# Patient Record
Sex: FEMALE | Race: WHITE | Employment: OTHER | URBAN - METROPOLITAN AREA
[De-identification: names, ages, dates, MRNs, and addresses within clinical notes are randomized per-mention and may not be internally consistent; named-entity substitution may affect disease eponyms.]

---

## 2017-01-12 ENCOUNTER — TELEPHONE (OUTPATIENT)
Dept: INTERNAL MEDICINE CLINIC | Facility: CLINIC | Age: 62
End: 2017-01-12

## 2017-01-12 DIAGNOSIS — F32.A DEPRESSIVE DISORDER: Primary | ICD-10-CM

## 2017-01-12 DIAGNOSIS — I83.811 VARICOSE VEINS OF RIGHT LOWER EXTREMITIES WITH PAIN: ICD-10-CM

## 2017-01-12 NOTE — TELEPHONE ENCOUNTER
Pt has a couple questions for the MD  Pt stts she will have new insurance as of 02-1-17  Pt also has meds that need to be refilled prior to her new insurance   Pt has also discontinued one of her New meds and would like to further discuss   Good call back

## 2017-01-13 NOTE — TELEPHONE ENCOUNTER
LV as more of an FYI from patient:     Patient states that she wanted LV to be aware that she has stopped taking her Oxybutynin , she states she was having terrible dry mouth and did not like the way it was making her feel.  In researching long term effects

## 2017-01-13 NOTE — TELEPHONE ENCOUNTER
Refill Protocol Appointment Criteria: Refilled per protocol    · Appointment scheduled in the past 6 months or in the next 3 months  Recent Visits       Provider Department Primary Dx    3 months ago Sejal Dixon MD [facility], [facility], [address] [facility] [place]

## 2017-01-17 NOTE — TELEPHONE ENCOUNTER
Per pt, she would like to talk to RN again, pt stts that she has more info to give to RN in regards to her insurance and med. Anne Fang Pls advise.

## 2017-01-18 NOTE — TELEPHONE ENCOUNTER
Patient should call Health plan for providers in network and benefits. Our vascular surgeons treat varicose veins that are not cosmetic.

## 2017-01-18 NOTE — TELEPHONE ENCOUNTER
I referred her to the vein clinic for painful varicose veins, not cosmetic treatment. Please see the referral.  Can she go to the vein clinic here for this using her new [employer] HMO?

## 2017-01-18 NOTE — TELEPHONE ENCOUNTER
Dr Junie Mesa, please advise. Pt says she is changing from [employer] to [employer] hmo effective 2/1. Pt says LV had referred her to Dr Wilda Reese and Dr Jean Pierre Willson.   Pt says she has made a lot of calls but she found out that her new insurance is not accepted

## 2017-01-19 NOTE — TELEPHONE ENCOUNTER
We have Dr. Aron Schneider who treats varicose veins.   Please place order for referral.  Thank Randolph Becker

## 2017-02-09 PROBLEM — I83.891 VARICOSE VEINS OF RIGHT LOWER EXTREMITY WITH COMPLICATIONS: Status: ACTIVE | Noted: 2017-02-09

## 2017-03-14 ENCOUNTER — TELEPHONE (OUTPATIENT)
Dept: INTERNAL MEDICINE CLINIC | Facility: CLINIC | Age: 62
End: 2017-03-14

## 2017-03-14 DIAGNOSIS — I83.93 VARICOSE VEINS OF BOTH LOWER EXTREMITIES: Primary | ICD-10-CM

## 2017-03-14 NOTE — TELEPHONE ENCOUNTER
Pt calling regarding her claim to see Dr. Everardo Ratliff was denied. Pt has updated her insurance and will need to see if we can back date referral so that her visit can be paid for pt appt was feb 9, 2017. Edwin Chin please advise

## 2017-03-16 NOTE — TELEPHONE ENCOUNTER
LMTCB on pt voice mail. There is an [facility] ref for 3 visits to Dr. Anil Barahona. More info is needed as to why claim is denied.

## 2017-03-21 NOTE — TELEPHONE ENCOUNTER
Pt is returning the call and would like to speak with Kenny Cabral. Please, call pt at [phone] most of the day until 3:00 pm after [phone].

## 2017-03-21 NOTE — TELEPHONE ENCOUNTER
Spoke  To pt. Stated her claims were denied because her ins changed on 2/1 to [facility] and the ref for 2/9 appt  To Dr. Miguel Red had old ins listed.  A new ref is generated and tasked to LV for signoff

## 2017-04-12 NOTE — TELEPHONE ENCOUNTER
Pt/[phone] until 5:00 today or after [phone] states that the insurance is still denying her claim and would like to speak with Pawan Singh.

## 2017-04-13 NOTE — TELEPHONE ENCOUNTER
Call transferred to me from the phone room. Pt received a denial of payment from her ins for OV in Feb with Dr. Franchesca Sandoval. Pt ins changed on 2/1 and the ref was generated in Jan reflecting the old ins.  LM with dr. Governor Peters office staff for Yadi at [phone]

## 2017-04-17 ENCOUNTER — TELEPHONE (OUTPATIENT)
Dept: INTERNAL MEDICINE CLINIC | Facility: CLINIC | Age: 62
End: 2017-04-17

## 2017-04-18 NOTE — TELEPHONE ENCOUNTER
Refill Protocol Appointment Criteria; PLEASE ADVISE ON REFILL. THANKS.   · Appointment scheduled in the past 6 months or in the next 3 months  Recent Visits       Provider Department Primary Dx    6 months ago Marcellus Olivares MD [facility], [address],

## 2017-04-18 NOTE — TELEPHONE ENCOUNTER
Pt states she is out of Rx Sertraline since over the weekend, Pt requesting refill ASAP. Pt states pharmacy has records they faxed request Friday , and was in epic Monday. Please Advise.        Current Outpatient Prescriptions:  Sertraline HCl 50 MG Ora

## 2017-04-27 NOTE — TELEPHONE ENCOUNTER
Pt returning call from Donato Coronado [facility].    Pt requesting call back at her work number [phone]

## 2017-04-27 NOTE — TELEPHONE ENCOUNTER
Spoke to pt. Called to inquire status of ov from Feb with incorrect ref. Marie Samuels at Dr. Olga Mora office who was checking on this. LM with office staff for Terrence Aldridge to call me back.  Phone room please forward call to [phone] until 4pm

## 2017-04-27 NOTE — TELEPHONE ENCOUNTER
Spoke to Yadi at Dr. Ksenia Burgess office. Ins has not made payment on the feb ov. I will send this to [facility] to research the possibility of a back dated ref.

## 2017-05-24 ENCOUNTER — TELEPHONE (OUTPATIENT)
Dept: INTERNAL MEDICINE CLINIC | Facility: CLINIC | Age: 62
End: 2017-05-24

## 2017-05-29 NOTE — TELEPHONE ENCOUNTER
There is a referral on file for vascular surgery dated 2/9/2017; is this still valid? In previous notes it looks as if there is a request for a back dated referral for Feb 2017.

## 2017-05-30 NOTE — TELEPHONE ENCOUNTER
Pt calling back. Pt stts she only has address for members only. Pt stts office will need to contact [facility] to receive address. ... [address] office visit was 2/9 , had ultrasound same day.    [facility] received office visit claim on 2/17 and tyra

## 2017-05-30 NOTE — TELEPHONE ENCOUNTER
Spoke to pt. Stated the ins has declined the back dated referral. Requesting a letter be generated by primary indicating a ref was in place on date of service but old ins company was listed.  Requested pt to call back with address of ins where to send lette

## 2017-05-31 NOTE — TELEPHONE ENCOUNTER
LV - please advise if ok to generate letter requested below  (I have already prepared and pend a draft for you to review)

## 2017-08-22 ENCOUNTER — TELEPHONE (OUTPATIENT)
Dept: INTERNAL MEDICINE CLINIC | Facility: CLINIC | Age: 62
End: 2017-08-22

## 2017-08-22 DIAGNOSIS — Q15.9 EYE ABNORMALITIES: Primary | ICD-10-CM

## 2017-08-22 NOTE — TELEPHONE ENCOUNTER
Pte called in today after a visit from her optometrist who suggests she see a ophthalmologist regarding a possible retina tear. She is very worried and would like a referral ASAP so she can set up an appt ,possibly,  this week at the [facility].

## 2017-08-22 NOTE — TELEPHONE ENCOUNTER
Spoke with patient she stated she was having flashes of light in her left eye. The flashing stopped but  Now she has a large floater in that same eye. Patient has a appointment tomorrow with Nannette Delong.  Patient requesting that referral be faxed to [phone]

## 2017-08-22 NOTE — TELEPHONE ENCOUNTER
Referral written, but we need to know what the problem is. Why did the optometrist send her? What are her symptoms? Does she see flashing lights? We have to have a reason to refer a patient.

## 2017-08-25 ENCOUNTER — MED REC SCAN ONLY (OUTPATIENT)
Dept: INTERNAL MEDICINE CLINIC | Facility: CLINIC | Age: 62
End: 2017-08-25

## 2017-08-31 ENCOUNTER — TELEPHONE (OUTPATIENT)
Dept: INTERNAL MEDICINE CLINIC | Facility: CLINIC | Age: 62
End: 2017-08-31

## 2017-08-31 ENCOUNTER — TELEPHONE (OUTPATIENT)
Dept: HEMATOLOGY/ONCOLOGY | Facility: HOSPITAL | Age: 62
End: 2017-08-31

## 2017-08-31 DIAGNOSIS — C50.812 CANCER OF OVERLAPPING SITES OF LEFT FEMALE BREAST (HCC): Primary | ICD-10-CM

## 2017-08-31 DIAGNOSIS — Z85.3 HISTORY OF BREAST CANCER: Primary | ICD-10-CM

## 2017-10-03 ENCOUNTER — HOSPITAL ENCOUNTER (OUTPATIENT)
Dept: MAMMOGRAPHY | Facility: HOSPITAL | Age: 62
Discharge: HOME | End: 2017-10-03
Attending: INTERNAL MEDICINE
Payer: COMMERCIAL

## 2017-10-03 DIAGNOSIS — C50.812 CANCER OF OVERLAPPING SITES OF LEFT FEMALE BREAST (HCC): ICD-10-CM

## 2017-10-03 PROCEDURE — 77065 DX MAMMO INCL CAD UNI: CPT | Performed by: INTERNAL MEDICINE

## 2017-10-04 ENCOUNTER — OFFICE VISIT (OUTPATIENT)
Dept: INTERNAL MEDICINE CLINIC | Facility: CLINIC | Age: 62
End: 2017-10-04

## 2017-10-04 VITALS
DIASTOLIC BLOOD PRESSURE: 67 MMHG | RESPIRATION RATE: 18 BRPM | HEART RATE: 61 BPM | SYSTOLIC BLOOD PRESSURE: 104 MMHG | HEIGHT: 67.5 IN | BODY MASS INDEX: 20.01 KG/M2 | TEMPERATURE: 98 F | WEIGHT: 129 LBS

## 2017-10-04 DIAGNOSIS — E78.00 PURE HYPERCHOLESTEROLEMIA: ICD-10-CM

## 2017-10-04 DIAGNOSIS — Z85.3 HISTORY OF BREAST CANCER: ICD-10-CM

## 2017-10-04 DIAGNOSIS — M85.80 OSTEOPENIA, UNSPECIFIED LOCATION: ICD-10-CM

## 2017-10-04 DIAGNOSIS — E55.9 VITAMIN D DEFICIENCY: ICD-10-CM

## 2017-10-04 DIAGNOSIS — Z00.00 PHYSICAL EXAM: Primary | ICD-10-CM

## 2017-10-04 DIAGNOSIS — I83.93 VARICOSE VEINS OF BOTH LOWER EXTREMITIES: ICD-10-CM

## 2017-10-04 DIAGNOSIS — Z23 NEED FOR VACCINATION: ICD-10-CM

## 2017-10-04 PROCEDURE — 90732 PPSV23 VACC 2 YRS+ SUBQ/IM: CPT | Performed by: INTERNAL MEDICINE

## 2017-10-04 PROCEDURE — 99396 PREV VISIT EST AGE 40-64: CPT | Performed by: INTERNAL MEDICINE

## 2017-10-04 PROCEDURE — 90686 IIV4 VACC NO PRSV 0.5 ML IM: CPT | Performed by: INTERNAL MEDICINE

## 2017-10-04 PROCEDURE — 90471 IMMUNIZATION ADMIN: CPT | Performed by: INTERNAL MEDICINE

## 2017-10-04 PROCEDURE — 90472 IMMUNIZATION ADMIN EACH ADD: CPT | Performed by: INTERNAL MEDICINE

## 2017-10-04 NOTE — PROGRESS NOTES
Pt was given the influenza vaccine in the left deltoid and the pneumonia 23 vaccine in the right deltoid. Pt tolerated both injection well.

## 2017-10-04 NOTE — PROGRESS NOTES
HPI:    Patient ID: Merle Matthews is a 63year old female.   Patient patient presents today for physical exam, states doing well otherwise, denies chest pain, shortness of breath, dyspnea on exertion or heart palpitations also denies nausea, vomiting, jairo Mouth/Throat: Uvula is midline and oropharynx is clear and moist. No oropharyngeal exudate or posterior oropharyngeal erythema. Eyes: Right eye exhibits no discharge. Left eye exhibits no discharge. No scleral icterus. Neck: Neck supple.  No JVD present • OSTEOPENIA 7/2010 borderline osteopenia (stopped Fosamax at this time)   • Osteoporosis    • OTHER DISEASES DEXA 2008 (normal)   • Unspecified vitamin D deficiency 4/16/2009      Past Surgical History:  1987: BREAST SURGERY PROCEDURE UNLISTED      Commen Smoking status: Never Smoker                                                              Smokeless tobacco: Never Used                      Alcohol use: Yes           0.0 oz/week     Comment: ONE PER MTH         Outpatient Prescriptions Marked as Taking f

## 2017-10-20 ENCOUNTER — TELEPHONE (OUTPATIENT)
Dept: OTHER | Age: 62
End: 2017-10-20

## 2017-10-20 DIAGNOSIS — I83.891 VARICOSE VEINS OF RIGHT LOWER EXTREMITY WITH COMPLICATIONS: Primary | ICD-10-CM

## 2017-10-20 DIAGNOSIS — Z85.3 HX: BREAST CANCER: ICD-10-CM

## 2017-10-20 NOTE — TELEPHONE ENCOUNTER
Referral generated per protocol and patient made aware via my chart CSM    Taken from a CMP my chart 10/11/17 encounter.     Hi--   Can you please confirm that referrals have been sent to both Dr. Galian Alcantara and Dr. Chano Case for my upcoming appointm

## 2017-10-28 ENCOUNTER — LAB ENCOUNTER (OUTPATIENT)
Dept: LAB | Age: 62
End: 2017-10-28
Attending: INTERNAL MEDICINE
Payer: COMMERCIAL

## 2017-10-28 DIAGNOSIS — Z00.00 PHYSICAL EXAM: ICD-10-CM

## 2017-10-28 DIAGNOSIS — E55.9 VITAMIN D DEFICIENCY: ICD-10-CM

## 2017-10-28 DIAGNOSIS — E78.00 PURE HYPERCHOLESTEROLEMIA: ICD-10-CM

## 2017-10-28 DIAGNOSIS — M85.80 OSTEOPENIA, UNSPECIFIED LOCATION: ICD-10-CM

## 2017-10-28 PROCEDURE — 36415 COLL VENOUS BLD VENIPUNCTURE: CPT

## 2017-10-28 PROCEDURE — 85025 COMPLETE CBC W/AUTO DIFF WBC: CPT

## 2017-10-28 PROCEDURE — 82306 VITAMIN D 25 HYDROXY: CPT

## 2017-10-28 PROCEDURE — 80061 LIPID PANEL: CPT

## 2017-10-28 PROCEDURE — 84443 ASSAY THYROID STIM HORMONE: CPT

## 2017-10-28 PROCEDURE — 80053 COMPREHEN METABOLIC PANEL: CPT

## 2017-10-28 PROCEDURE — 81015 MICROSCOPIC EXAM OF URINE: CPT

## 2017-11-01 ENCOUNTER — TELEPHONE (OUTPATIENT)
Dept: INTERNAL MEDICINE CLINIC | Facility: CLINIC | Age: 62
End: 2017-11-01

## 2017-11-01 NOTE — TELEPHONE ENCOUNTER
Please resend all lab resulst from oct. Blood draw to my chart.   Patient did not receive all the results

## 2017-11-01 NOTE — TELEPHONE ENCOUNTER
Please call patient with blood test  Blood count is normal kidneys and liver function normal cholesterol  ,sugars also normal    Vitamin D level is in good range  F/u visit  As   Discussed with pt at  Visit  Time

## 2017-11-01 NOTE — TELEPHONE ENCOUNTER
Dr Seema Sim,    See pts' message below and advise on 10/28/17 lab results and release thru [employer]. Thank you.

## 2017-11-01 NOTE — TELEPHONE ENCOUNTER
Message sent to me apparently by mistake, because review of epic shows that I have never seen this patient before.

## 2017-11-02 ENCOUNTER — OFFICE VISIT (OUTPATIENT)
Dept: HEMATOLOGY/ONCOLOGY | Facility: HOSPITAL | Age: 62
End: 2017-11-02
Attending: INTERNAL MEDICINE
Payer: COMMERCIAL

## 2017-11-02 VITALS
TEMPERATURE: 98 F | SYSTOLIC BLOOD PRESSURE: 118 MMHG | WEIGHT: 132 LBS | RESPIRATION RATE: 16 BRPM | HEIGHT: 67.5 IN | HEART RATE: 62 BPM | BODY MASS INDEX: 20.47 KG/M2 | DIASTOLIC BLOOD PRESSURE: 47 MMHG

## 2017-11-02 DIAGNOSIS — F32.A DEPRESSIVE DISORDER: ICD-10-CM

## 2017-11-02 DIAGNOSIS — M85.80 OSTEOPENIA DETERMINED BY X-RAY: ICD-10-CM

## 2017-11-02 DIAGNOSIS — Z17.0 MALIGNANT NEOPLASM OF OVERLAPPING SITES OF LEFT BREAST IN FEMALE, ESTROGEN RECEPTOR POSITIVE (HCC): Primary | ICD-10-CM

## 2017-11-02 DIAGNOSIS — C50.812 MALIGNANT NEOPLASM OF OVERLAPPING SITES OF LEFT BREAST IN FEMALE, ESTROGEN RECEPTOR POSITIVE (HCC): Primary | ICD-10-CM

## 2017-11-02 PROCEDURE — 99214 OFFICE O/P EST MOD 30 MIN: CPT | Performed by: INTERNAL MEDICINE

## 2017-11-02 PROCEDURE — 99212 OFFICE O/P EST SF 10 MIN: CPT | Performed by: INTERNAL MEDICINE

## 2017-11-03 NOTE — PROGRESS NOTES
KENDRA        Naeem Denis is a 63year old female with a history of breast cancer who returns for follow-up. Patient is not aware of local chest wall recurrence or symptoms that would suggest metastatic disease.   She is upset today with her daughter's Cardiovascular: Negative for chest pain and palpitations. Gastrointestinal: Negative for abdominal pain, diarrhea, nausea and vomiting. Genitourinary: Positive for frequency and urgency. Negative for dysuria and hematuria.         Possible change in sym • OTHER DISEASES DEXA 2008 (normal)   • Unspecified vitamin D deficiency 4/16/2009     Past Surgical History:  1987: BREAST SURGERY PROCEDURE UNLISTED      Comment: mastectomy  3/2013: COLONOSCOPY      Comment: hemorrhoids.  repeat 5 yrs, 2 day prep  11/21/ • Hypertension Mother    • Arthritis Mother      per NG: rheumatoid arthritis   • Other Tong Herb Mother      RA;TB;PE &DVT   • Cancer Maternal Grandmother      COLON   • Other [OTHER] Maternal Grandfather      CVA   • Cancer Paternal Grandmother      LUNG Depressive disorder  Osteopenia determined by x-ray     Patient does not have evidence of local recurrence or metastatic hormone receptor positive infiltrating ductal pT3 N2 M0 left breast cancer.      She has dense nodular, category c breast tissue and is MCV      80.0 - 100.0 fL 93.4   MCH      27.0 - 32.0 pg 31.4   MCHC      32.0 - 37.0 g/dl 33.6   RDW      11.0 - 15.0 % 12.7   Platelet Count      711 - 400 K/   MEAN PLATELET VOLUME      7.4 - 10.3 fL 7.9   Neutrophils %      % 62   Lymphocytes % INDICATIONS:  Left mastectomy 1987 with chemo, radiation therapy. . 65 year-old female with a personal history of breast cancer at age 36 and a daughter at age 44 with breast cancer. Patient is status post left mastectomy.  She's had a right reduction and T scores are a comparison to sex-matched patients with mean peak bone mass and are given in standard deviation (s.d.). Each 1 s.d. corresponds to approximately 10% below peak normal bone density.        [address]

## 2017-12-05 NOTE — TELEPHONE ENCOUNTER
Refill Protocol Appointment Criteria  · Appointment scheduled in the past 6 months or in the next 3 months  Recent Outpatient Visits            5 days ago 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    [address]

## 2018-03-01 NOTE — TELEPHONE ENCOUNTER
Psychiatric Non-Scheduled (Anti-Anxiety) Passed  Refill Protocol Appointment Criteria  · Appointment scheduled in the past 6 months or in the next 3 months  Recent Outpatient Visits            3 months ago Varicose veins of right lower extremity with compl

## 2018-05-03 NOTE — TELEPHONE ENCOUNTER
Refill protocol failed because the patient did not meet the protocol criteria.  Please advise in regards to refill request       Refill Protocol Appointment Criteria  · Appointment scheduled in the past 6 months or in the next 3 months  Recent Outpatient Vi

## 2018-05-07 NOTE — TELEPHONE ENCOUNTER
Pt was contacted and it was stated it was stated that she is a pt of Dr. Valentine Rivera and that she will f/u with her.

## 2018-06-10 NOTE — TELEPHONE ENCOUNTER
Failed per nursing protocol - please advise on refill request     Refill Protocol Appointment Criteria  · Appointment scheduled in the past 6 months or in the next 3 months  Recent Outpatient Visits            6 months ago Varicose veins of right lower ex

## 2018-07-09 ENCOUNTER — OFFICE VISIT (OUTPATIENT)
Dept: INTERNAL MEDICINE CLINIC | Facility: CLINIC | Age: 63
End: 2018-07-09

## 2018-07-09 VITALS
WEIGHT: 128 LBS | HEART RATE: 84 BPM | SYSTOLIC BLOOD PRESSURE: 90 MMHG | DIASTOLIC BLOOD PRESSURE: 57 MMHG | HEIGHT: 67.5 IN | BODY MASS INDEX: 19.86 KG/M2

## 2018-07-09 DIAGNOSIS — Z00.00 ROUTINE HEALTH MAINTENANCE: ICD-10-CM

## 2018-07-09 DIAGNOSIS — H93.12 TINNITUS OF LEFT EAR: Primary | ICD-10-CM

## 2018-07-09 DIAGNOSIS — R32 URINARY INCONTINENCE, UNSPECIFIED TYPE: ICD-10-CM

## 2018-07-09 DIAGNOSIS — H91.90 HEARING LOSS, UNSPECIFIED HEARING LOSS TYPE, UNSPECIFIED LATERALITY: ICD-10-CM

## 2018-07-09 DIAGNOSIS — F32.A MILD DEPRESSION: ICD-10-CM

## 2018-07-09 PROCEDURE — 99214 OFFICE O/P EST MOD 30 MIN: CPT | Performed by: INTERNAL MEDICINE

## 2018-07-09 PROCEDURE — 99212 OFFICE O/P EST SF 10 MIN: CPT | Performed by: INTERNAL MEDICINE

## 2018-07-09 NOTE — PROGRESS NOTES
HPI:    Patient ID: Lisha Coronel is a 63year old female. Pt c/o tinnitus for many years and it didn't bother her that much, but it is getting worse. She did a hearing test and may have problems with her hearing. Pt c/o incontinence.  She has been CA  AGE 29: SPECIAL SERVICE OR REPORT      Comment: LEFT BREAST RECONSTRUCTION WITH SALINE IMPLANT  AGE 32: SPECIAL SERVICE OR REPORT      Comment: RIGHT BREAST REDUCTION  No date: VAGINAL HYSTERECTOMY      Comment: WITH ONE SO FOR CYST         Current Out Conjunctivae and EOM are normal.   Cardiovascular: Normal rate, regular rhythm and normal heart sounds. Pulmonary/Chest: Effort normal and breath sounds normal. No respiratory distress.    Neurological: She is alert and oriented to person, place, and batsheva

## 2018-07-10 NOTE — ASSESSMENT & PLAN NOTE
Pt has taken medication with no relief. She would likely benefit from pelvic floor PT, but would like to see urology first.  Will refer to urology.

## 2018-07-10 NOTE — ASSESSMENT & PLAN NOTE
Pt has a long history of depression and would like to see a therapist.  Will refer to nurse navigator to help find a therapist in her plan. Continue sertraline.

## 2018-08-16 ENCOUNTER — OFFICE VISIT (OUTPATIENT)
Dept: AUDIOLOGY | Facility: CLINIC | Age: 63
End: 2018-08-16

## 2018-08-16 ENCOUNTER — OFFICE VISIT (OUTPATIENT)
Dept: OTOLARYNGOLOGY | Facility: CLINIC | Age: 63
End: 2018-08-16

## 2018-08-16 VITALS
HEIGHT: 67.5 IN | BODY MASS INDEX: 19.54 KG/M2 | DIASTOLIC BLOOD PRESSURE: 70 MMHG | SYSTOLIC BLOOD PRESSURE: 102 MMHG | WEIGHT: 126 LBS | TEMPERATURE: 98 F

## 2018-08-16 DIAGNOSIS — H90.3 SENSORINEURAL HEARING LOSS, BILATERAL: ICD-10-CM

## 2018-08-16 DIAGNOSIS — H93.12 TINNITUS, LEFT: ICD-10-CM

## 2018-08-16 DIAGNOSIS — H93.13 RINGING IN EAR, BILATERAL: Primary | ICD-10-CM

## 2018-08-16 PROCEDURE — 99243 OFF/OP CNSLTJ NEW/EST LOW 30: CPT | Performed by: OTOLARYNGOLOGY

## 2018-08-16 PROCEDURE — 92570 ACOUSTIC IMMITANCE TESTING: CPT | Performed by: AUDIOLOGIST

## 2018-08-16 PROCEDURE — 92557 COMPREHENSIVE HEARING TEST: CPT | Performed by: AUDIOLOGIST

## 2018-08-16 PROCEDURE — 99212 OFFICE O/P EST SF 10 MIN: CPT | Performed by: OTOLARYNGOLOGY

## 2018-08-16 NOTE — PROGRESS NOTES
[place] was referred for testing by Cornell Campuzano V for left side tinnitus   10/6/1955  [id number]        Otoscopic inspection: right ear no cerumen; left ear no cerumen.        Tests/Procedures  Patient was tested via  standard inse

## 2018-08-16 NOTE — PROGRESS NOTES
Pearl Rojas is a 63year old female.  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 I recommended just observation with a repeat audiogram in 1-2 years. - OP REFERRAL TO AUDIOLOGY      The patient indicates understanding of these issues and agrees to the plan. Tyrone Lynn MD  8/16/2018  2:18

## 2018-09-21 ENCOUNTER — TELEPHONE (OUTPATIENT)
Dept: INTERNAL MEDICINE CLINIC | Facility: CLINIC | Age: 63
End: 2018-09-21

## 2018-09-21 NOTE — TELEPHONE ENCOUNTER
Please use one of the 2 available slots on 10/15, but let her know that I am at [place] on that day.

## 2018-09-21 NOTE — TELEPHONE ENCOUNTER
Pt requesting px appt with Dr Germán Michael on 10/8 anytime  can accommodate      Or on 10/10, 10/15 or 10/ 29- any time between 8:45a-2:30p. No px slots available   Esteban Enirquez these are days she will be working from home.     Please reply to pool: SUSSY New

## 2018-09-26 ENCOUNTER — TELEPHONE (OUTPATIENT)
Dept: HEMATOLOGY/ONCOLOGY | Facility: HOSPITAL | Age: 63
End: 2018-09-26

## 2018-09-26 DIAGNOSIS — Z12.39 SCREENING FOR BREAST CANCER: ICD-10-CM

## 2018-09-26 DIAGNOSIS — Z85.3 PERSONAL HISTORY OF BREAST CANCER: Primary | ICD-10-CM

## 2018-09-26 DIAGNOSIS — R92.2 DENSE BREAST TISSUE ON MAMMOGRAM: ICD-10-CM

## 2018-10-09 ENCOUNTER — LAB ENCOUNTER (OUTPATIENT)
Dept: LAB | Age: 63
End: 2018-10-09
Attending: INTERNAL MEDICINE
Payer: COMMERCIAL

## 2018-10-09 DIAGNOSIS — Z00.00 ROUTINE HEALTH MAINTENANCE: ICD-10-CM

## 2018-10-09 PROCEDURE — 80053 COMPREHEN METABOLIC PANEL: CPT

## 2018-10-09 PROCEDURE — 84443 ASSAY THYROID STIM HORMONE: CPT

## 2018-10-09 PROCEDURE — 36415 COLL VENOUS BLD VENIPUNCTURE: CPT

## 2018-10-09 PROCEDURE — 85025 COMPLETE CBC W/AUTO DIFF WBC: CPT

## 2018-10-09 PROCEDURE — 80061 LIPID PANEL: CPT

## 2018-10-15 ENCOUNTER — MED REC SCAN ONLY (OUTPATIENT)
Dept: INTERNAL MEDICINE CLINIC | Facility: CLINIC | Age: 63
End: 2018-10-15

## 2018-10-15 ENCOUNTER — OFFICE VISIT (OUTPATIENT)
Dept: INTERNAL MEDICINE CLINIC | Facility: CLINIC | Age: 63
End: 2018-10-15

## 2018-10-15 VITALS
HEART RATE: 61 BPM | HEIGHT: 67.5 IN | DIASTOLIC BLOOD PRESSURE: 67 MMHG | RESPIRATION RATE: 16 BRPM | TEMPERATURE: 98 F | BODY MASS INDEX: 20.01 KG/M2 | SYSTOLIC BLOOD PRESSURE: 104 MMHG | WEIGHT: 129 LBS

## 2018-10-15 DIAGNOSIS — D72.810 LYMPHOCYTOPENIA: ICD-10-CM

## 2018-10-15 DIAGNOSIS — F32.A MILD DEPRESSION: ICD-10-CM

## 2018-10-15 DIAGNOSIS — M85.89 OSTEOPENIA OF MULTIPLE SITES: ICD-10-CM

## 2018-10-15 DIAGNOSIS — Z00.00 ROUTINE HEALTH MAINTENANCE: Primary | ICD-10-CM

## 2018-10-15 PROBLEM — D70.9 NEUTROPENIA: Status: ACTIVE | Noted: 2018-10-15

## 2018-10-15 PROBLEM — D70.9 NEUTROPENIA (HCC): Status: ACTIVE | Noted: 2018-10-15

## 2018-10-15 PROCEDURE — 90686 IIV4 VACC NO PRSV 0.5 ML IM: CPT | Performed by: INTERNAL MEDICINE

## 2018-10-15 PROCEDURE — 90471 IMMUNIZATION ADMIN: CPT | Performed by: INTERNAL MEDICINE

## 2018-10-15 PROCEDURE — 99396 PREV VISIT EST AGE 40-64: CPT | Performed by: INTERNAL MEDICINE

## 2018-10-15 NOTE — ASSESSMENT & PLAN NOTE
Counseled regarding need for Calcium with D, 1200 mg in divided doses. Counseled regarding weight-bearing exercise.

## 2018-10-15 NOTE — PROGRESS NOTES
HPI:    Patient ID: Gemini Shirley is a 66year old female. Pt is here for a physical.    She saw the urologist and she told her it was mild and put her on Kegel exercises. She saw the ENT and was told she didn't need a hearing aid yet.   She is stress OOPHORECTOMY      per NG: x1   • RADIATION LEFT Left 1988   • REDUCTION RIGHT  1990s   • SPECIAL SERVICE OR REPORT      B/L BUNIONECTOMIES   • SPECIAL SERVICE OR REPORT  AGE 31    Left MRM FOR STAGE 3 BREAST CA   • SPECIAL SERVICE OR REPORT  AGE 32    LEFT Ht 5' 7.5\" (1.715 m)   Wt 129 lb (58.5 kg)   BMI 19.91 kg/m²   PHYSICAL EXAM:   Physical Exam    Constitutional: She is oriented to person, place, and time. She appears well-developed and well-nourished. HENT:   Head: Normocephalic and atraumatic.    Ri 1200 mg in divided doses. Counseled regarding weight-bearing exercise. Mild depression (HCC)     Stable. Continue current meds. Pt has the names of therapists. Lymphocytopenia     Recheck CBC in 1 month.          Relevant Orders    CBC

## 2018-10-22 ENCOUNTER — HOSPITAL ENCOUNTER (OUTPATIENT)
Dept: MAMMOGRAPHY | Age: 63
Discharge: HOME | End: 2018-10-22
Attending: INTERNAL MEDICINE
Payer: COMMERCIAL

## 2018-10-22 DIAGNOSIS — R92.2 DENSE BREAST TISSUE ON MAMMOGRAM: ICD-10-CM

## 2018-10-22 DIAGNOSIS — Z85.3 PERSONAL HISTORY OF BREAST CANCER: ICD-10-CM

## 2018-10-22 DIAGNOSIS — Z12.39 SCREENING FOR BREAST CANCER: ICD-10-CM

## 2018-10-22 PROCEDURE — 77063 BREAST TOMOSYNTHESIS BI: CPT | Performed by: INTERNAL MEDICINE

## 2018-10-22 PROCEDURE — 77067 SCR MAMMO BI INCL CAD: CPT | Performed by: INTERNAL MEDICINE

## 2018-10-29 ENCOUNTER — OFFICE VISIT (OUTPATIENT)
Dept: HEMATOLOGY/ONCOLOGY | Facility: HOSPITAL | Age: 63
End: 2018-10-29
Attending: INTERNAL MEDICINE
Payer: COMMERCIAL

## 2018-10-29 VITALS
TEMPERATURE: 98 F | SYSTOLIC BLOOD PRESSURE: 103 MMHG | HEART RATE: 56 BPM | BODY MASS INDEX: 19.86 KG/M2 | DIASTOLIC BLOOD PRESSURE: 47 MMHG | RESPIRATION RATE: 16 BRPM | WEIGHT: 128 LBS | HEIGHT: 67.5 IN

## 2018-10-29 DIAGNOSIS — M85.89 OSTEOPENIA OF MULTIPLE SITES: ICD-10-CM

## 2018-10-29 DIAGNOSIS — F32.A MILD DEPRESSION: ICD-10-CM

## 2018-10-29 DIAGNOSIS — Z80.0 FAMILY HISTORY OF MALIGNANT NEOPLASM OF GASTROINTESTINAL TRACT: ICD-10-CM

## 2018-10-29 DIAGNOSIS — Z17.0 MALIGNANT NEOPLASM OF OVERLAPPING SITES OF LEFT BREAST IN FEMALE, ESTROGEN RECEPTOR POSITIVE (HCC): Primary | ICD-10-CM

## 2018-10-29 DIAGNOSIS — C50.812 MALIGNANT NEOPLASM OF OVERLAPPING SITES OF LEFT BREAST IN FEMALE, ESTROGEN RECEPTOR POSITIVE (HCC): Primary | ICD-10-CM

## 2018-10-29 DIAGNOSIS — Z80.3 FAMILY HISTORY OF BREAST CANCER IN FIRST DEGREE RELATIVE: ICD-10-CM

## 2018-10-29 PROCEDURE — 99214 OFFICE O/P EST MOD 30 MIN: CPT | Performed by: INTERNAL MEDICINE

## 2018-11-14 ENCOUNTER — NURSE TRIAGE (OUTPATIENT)
Dept: OTHER | Age: 63
End: 2018-11-14

## 2018-11-14 ENCOUNTER — OFFICE VISIT (OUTPATIENT)
Dept: INTERNAL MEDICINE CLINIC | Facility: CLINIC | Age: 63
End: 2018-11-14

## 2018-11-14 VITALS
SYSTOLIC BLOOD PRESSURE: 110 MMHG | DIASTOLIC BLOOD PRESSURE: 66 MMHG | HEART RATE: 55 BPM | WEIGHT: 131 LBS | BODY MASS INDEX: 20.32 KG/M2 | HEIGHT: 67.5 IN

## 2018-11-14 DIAGNOSIS — M54.41 ACUTE RIGHT-SIDED LOW BACK PAIN WITH RIGHT-SIDED SCIATICA: Primary | ICD-10-CM

## 2018-11-14 PROCEDURE — 99212 OFFICE O/P EST SF 10 MIN: CPT | Performed by: PHYSICIAN ASSISTANT

## 2018-11-14 PROCEDURE — 99213 OFFICE O/P EST LOW 20 MIN: CPT | Performed by: PHYSICIAN ASSISTANT

## 2018-11-14 RX ORDER — NAPROXEN 500 MG/1
500 TABLET ORAL 2 TIMES DAILY WITH MEALS
Qty: 30 TABLET | Refills: 0 | Status: SHIPPED | OUTPATIENT
Start: 2018-11-14 | End: 2018-11-20

## 2018-11-14 NOTE — PROGRESS NOTES
HPI:    Patient ID: Selena Garibay is a 66year old female. HPI   Patient presents today complaining of right lower back pain since Monday. Pain is located in the right lower back and radiates down the leg to the foot.   This morning she began having n UNKNOWN  Penicillins             HIVES   PHYSICAL EXAM:   Physical Exam   Nursing note and vitals reviewed. Constitutional: She appears well-developed and well-nourished. Cardiovascular: Normal rate, regular rhythm and normal heart sounds.

## 2018-11-14 NOTE — TELEPHONE ENCOUNTER
Action Requested: Summary for Provider     []  Critical Lab, Recommendations Needed  [] Need Additional Advice  []   FYI    []   Need Orders  [] Need Medications Sent to Pharmacy  []  Other     SUMMARY: appt made today with PATTI Serrato.     Reason for

## 2018-11-18 PROBLEM — Z80.3 FAMILY HISTORY OF BREAST CANCER IN FIRST DEGREE RELATIVE: Status: ACTIVE | Noted: 2018-11-18

## 2018-11-18 NOTE — PROGRESS NOTES
HPI  10/29/2018 VISIT   Johana Mckeon is a 66year old female with a history of pT3 N2 M0 left breast cancer 8/28/1987 who returns for follow-up. Patient is not aware of local chest wall recurrence or symptoms that would suggest metastatic disease.   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     Cardiovascular: Negative for chest pain and • Disorder of bone and cartilage, unspecified 2005    OSTEOPENIA; DEXA 4/4/08-NORMAL   • Displacement of lumbar intervertebral disc without myelopathy     L4-5   • Hypertonicity of bladder    • [facility]IGN [facility] 11/27/2007   • Malignant neoplas 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    Tobacco Use      Smoking status: Never Smoker      Smokeless tobacco: Never Used    Substance and Sexual Activity      Alcohol use:  Yes        Alcohol/week: 0.0 oz        Comment: ONE PER [facility]      Tone Pulmonary/Chest: Effort normal and breath sounds normal. No respiratory distress. Breasts are asymmetrical.       Abdominal: Soft. Bowel sounds are normal. She exhibits no distension and no mass. There is no tenderness.    Musculoskeletal: Normal range of m Latest Ref Rng & Units 10/9/2018 10/28/2017 9/24/2016   Glucose      70 - 99 mg/dL 88 84 85   Sodium      136 - 144 mmol/L 143 140 141   Potassium      3.3 - 5.1 mmol/L 4.9 4.3 4.0   Chloride      95 - 110 mmol/L 104 104 103   Carbon Dioxide, Total 0.0 - 0.2 K/UL   0.0   RDW      11.0 - 15.0 % 12.6 12.7    Neutrophils Absolute      1.8 - 7.7 K/UL 1.8 2.8    Lymphocytes Absolute      1.0 - 4.0 K/UL 0.9 (L) 1.2    Monocytes Absolute      0.0 - 1.0 K/UL 0.2 0.3    Eosinophils Absolute      0.0 - 0.7 mastopexy. The patient has a family history of breast cancer, diagnosed in her daughter at age 44. She has no new breast related complaints. TECHNIQUE:    Full field direct screening mammography was performed and images were reviewed with the R2 JESÚS 1. BMD:    0.666 gm/sq. cm.            T SCORE:         -1.6       Change since previous:  4% decrease     PA LUMBAR SPINE (L1 - L4)               BMD:    1.033 gm/sq.  cm.            T SCORE:         -0.1        Change since previous: 2.1% decrea

## 2018-11-19 ENCOUNTER — TELEPHONE (OUTPATIENT)
Dept: OTHER | Age: 63
End: 2018-11-19

## 2018-11-19 NOTE — TELEPHONE ENCOUNTER
Spoke with patient and relayed LV message--patient verbalizes understanding and agreement. Appt made for tomorrow at 8:50 a.m. With LV. No further questions/concerns at this time.

## 2018-11-19 NOTE — TELEPHONE ENCOUNTER
Patient calling and states that LOV 11/14/18 with Terry Green told her to call back if symptoms are not improving back pain and sciatica for the next step.  States that symptoms are still the same and occasionally little worse, all the conservative treatm

## 2018-11-19 NOTE — TELEPHONE ENCOUNTER
For pain control we can consider low dose steroid or opioid such as Tylenol #3 or Tramadol. We can also try a course of physical therapy to help strengthen the core and stretch/massage tight areas.  If this does not help, then would consider further imaging

## 2018-11-20 ENCOUNTER — OFFICE VISIT (OUTPATIENT)
Dept: INTERNAL MEDICINE CLINIC | Facility: CLINIC | Age: 63
End: 2018-11-20

## 2018-11-20 ENCOUNTER — TELEPHONE (OUTPATIENT)
Dept: INTERNAL MEDICINE CLINIC | Facility: CLINIC | Age: 63
End: 2018-11-20

## 2018-11-20 VITALS
HEART RATE: 72 BPM | BODY MASS INDEX: 20.14 KG/M2 | SYSTOLIC BLOOD PRESSURE: 110 MMHG | WEIGHT: 129.81 LBS | HEIGHT: 67.5 IN | DIASTOLIC BLOOD PRESSURE: 70 MMHG

## 2018-11-20 DIAGNOSIS — D72.810 LYMPHOCYTOPENIA: ICD-10-CM

## 2018-11-20 DIAGNOSIS — M54.31 SCIATICA OF RIGHT SIDE: Primary | ICD-10-CM

## 2018-11-20 PROCEDURE — 99214 OFFICE O/P EST MOD 30 MIN: CPT | Performed by: INTERNAL MEDICINE

## 2018-11-20 PROCEDURE — 99212 OFFICE O/P EST SF 10 MIN: CPT | Performed by: INTERNAL MEDICINE

## 2018-11-20 RX ORDER — PREDNISONE 10 MG/1
TABLET ORAL
Qty: 20 TABLET | Refills: 0 | Status: SHIPPED | OUTPATIENT
Start: 2018-11-20 | End: 2018-11-29

## 2018-11-20 NOTE — TELEPHONE ENCOUNTER
Pt was seen by  today and [facility] received referral for PT. [facility] she called [facility] to check that it would be approved and [facility] was told if referral was made as urgent it would be approved faster than the normal referral that was created.  Pt st

## 2018-11-20 NOTE — TELEPHONE ENCOUNTER
I changed the order to urgent. Additionally, however, it appears that the pt did not do the blood draw. Please remind her to do it.

## 2018-11-20 NOTE — TELEPHONE ENCOUNTER
Called pt and informed of MD comments  Pt verbalized understanding and states that she will get the blood work done

## 2018-11-20 NOTE — PROGRESS NOTES
HPI:    Patient ID: Selena Garibay is a 66year old female. Pt c/o sciatica for 8 days. Back Pain   This is a recurrent problem. The current episode started 1 to 4 weeks ago. The problem occurs constantly. The problem is unchanged.  The pain is pre Sertraline HCl 50 MG Oral Tab Take 1 tablet (50 mg total) by mouth once daily. Disp: 30 tablet Rfl: 5   Calcium Carbonate-Vitamin D (CALCIUM 500 + D OR) Take by mouth. Disp:  Rfl:    Probiotic Product (ALIGN) Oral Cap Take  by mouth.  Disp:  Rfl:    MULTI Neurological: She is alert and oriented to person, place, and time. She has normal strength. She displays normal reflexes. No motor deficit.               ASSESSMENT/PLAN:     Problem List Items Addressed This Visit        High    Sciatica of right side -

## 2018-11-20 NOTE — ASSESSMENT & PLAN NOTE
Pt has recurrent sciatica. Discussed benefit and side effects of steroids. Pt understands and would like to do a trial of steroids. PT ordered. If no improvement, will refer to physiatry.

## 2018-11-26 ENCOUNTER — NURSE TRIAGE (OUTPATIENT)
Dept: INTERNAL MEDICINE CLINIC | Facility: CLINIC | Age: 63
End: 2018-11-26

## 2018-11-26 NOTE — TELEPHONE ENCOUNTER
Began to taper a little early:  Took 3 for the first 3 days  Day 4 she took 2  Day 5 & 6 took 1.5 pills     She reports improvement in her pain, but she is unable to handle the side effects of jitterness and scattered thoughts.

## 2018-11-28 NOTE — TELEPHONE ENCOUNTER
Please see message below and advise, pt asking for recommendations for side effects. Only took one pill today and not as bad. Pt states doesn't want to stop completely if that will make side effects worse. Pt states she has a few pills left.

## 2018-11-30 ENCOUNTER — OFFICE VISIT (OUTPATIENT)
Dept: PHYSICAL THERAPY | Age: 63
End: 2018-11-30
Attending: INTERNAL MEDICINE
Payer: COMMERCIAL

## 2018-11-30 DIAGNOSIS — M54.31 SCIATICA OF RIGHT SIDE: ICD-10-CM

## 2018-11-30 PROCEDURE — 97161 PT EVAL LOW COMPLEX 20 MIN: CPT | Performed by: PHYSICAL THERAPIST

## 2018-11-30 PROCEDURE — 97110 THERAPEUTIC EXERCISES: CPT | Performed by: PHYSICAL THERAPIST

## 2018-11-30 NOTE — PROGRESS NOTES
LOWER EXTREMITY EVALUATION:   Referring Physician: Dr. Liset Pugh  Date of Onset: Nov 12, 2018 Date of Service: 11/30/2018   Diagnosis: R sciatica  PATIENT SUMMARY:   Venus Green is a 66year old y/o female who presents to therapy today with complaints o Precautions:  None     OBJECTIVE:   Gait: decreased stance time R  Palpation: No noted tenderness  Sensation: tingling 4 toes  Baseline: standing:  Groin/LB, R hip, tingling 4 toes (not great toe)     Prone lying - groin/tingling toes  BHAVIN - D/B tingli sign and return this letter via fax as soon as possible to [phone]. If you have any questions, please contact me at Dept: [phone]    Sincerely,  Electronically signed by therapist: Esau Guy PT    Physician's certification required:  Yes

## 2018-12-04 ENCOUNTER — OFFICE VISIT (OUTPATIENT)
Dept: PHYSICAL THERAPY | Age: 63
End: 2018-12-04
Attending: INTERNAL MEDICINE
Payer: COMMERCIAL

## 2018-12-04 PROCEDURE — 97110 THERAPEUTIC EXERCISES: CPT | Performed by: PHYSICAL THERAPIST

## 2018-12-04 NOTE — PROGRESS NOTES
Diagnosis: R sciatica  Authorized # of Visits:  2        Next MD visit: none scheduled  Fall Risk: standard         Precautions: n/a           Medication Changes since last visit?: No  Subjective: Patient reports when she does exercises it does help with t

## 2018-12-05 ENCOUNTER — OFFICE VISIT (OUTPATIENT)
Dept: PHYSICAL THERAPY | Age: 63
End: 2018-12-05
Attending: INTERNAL MEDICINE
Payer: COMMERCIAL

## 2018-12-05 PROCEDURE — 97110 THERAPEUTIC EXERCISES: CPT | Performed by: PHYSICAL THERAPIST

## 2018-12-05 NOTE — PROGRESS NOTES
Diagnosis: R sciatica  Authorized # of Visits:  3        Next MD visit: none scheduled  Fall Risk: standard         Precautions: n/a           Medication Changes since last visit?: No  Subjective: Patient reports is now off of the prednisone.   States feels

## 2018-12-11 ENCOUNTER — OFFICE VISIT (OUTPATIENT)
Dept: PHYSICAL THERAPY | Age: 63
End: 2018-12-11
Attending: INTERNAL MEDICINE
Payer: COMMERCIAL

## 2018-12-11 PROCEDURE — 97110 THERAPEUTIC EXERCISES: CPT | Performed by: PHYSICAL THERAPIST

## 2018-12-14 ENCOUNTER — OFFICE VISIT (OUTPATIENT)
Dept: PHYSICAL THERAPY | Age: 63
End: 2018-12-14
Attending: INTERNAL MEDICINE
Payer: COMMERCIAL

## 2018-12-14 PROCEDURE — 97110 THERAPEUTIC EXERCISES: CPT | Performed by: PHYSICAL THERAPIST

## 2018-12-14 NOTE — PROGRESS NOTES
Diagnosis: R sciatica  Authorized # of Visits:  5        Next MD visit: none scheduled  Fall Risk: standard         Precautions: n/a           Medication Changes since last visit?: No  Subjective: Patient reports she didn't get too much exercise in [facility]

## 2018-12-18 ENCOUNTER — OFFICE VISIT (OUTPATIENT)
Dept: PHYSICAL THERAPY | Age: 63
End: 2018-12-18
Attending: INTERNAL MEDICINE
Payer: COMMERCIAL

## 2018-12-18 ENCOUNTER — TELEPHONE (OUTPATIENT)
Dept: INTERNAL MEDICINE CLINIC | Facility: CLINIC | Age: 63
End: 2018-12-18

## 2018-12-18 DIAGNOSIS — M54.31 SCIATICA OF RIGHT SIDE: Primary | ICD-10-CM

## 2018-12-18 PROCEDURE — 97110 THERAPEUTIC EXERCISES: CPT | Performed by: PHYSICAL THERAPIST

## 2018-12-18 NOTE — PROGRESS NOTES
Diagnosis: R sciatica  Authorized # of Visits:  6        Next MD visit: none scheduled  Fall Risk: standard         Precautions: n/a           Medication Changes since last visit?: No   PROGRESS REPORT- REQUESTING ADDITIONAL 8 VISITS TO CONTINUE TO ADVANCE Charges: Ex 3       Total Timed Treatment: 40 min  Total Treatment Time: 40 min

## 2018-12-18 NOTE — TELEPHONE ENCOUNTER
Therapist is requesting more PT sessions. PT has improved, but still having pain. Would benefit from 8 more visits.

## 2018-12-19 ENCOUNTER — OFFICE VISIT (OUTPATIENT)
Dept: PHYSICAL THERAPY | Age: 63
End: 2018-12-19
Attending: INTERNAL MEDICINE
Payer: COMMERCIAL

## 2018-12-19 PROCEDURE — 97110 THERAPEUTIC EXERCISES: CPT | Performed by: PHYSICAL THERAPIST

## 2018-12-19 NOTE — PROGRESS NOTES
Diagnosis: R sciatica  Authorized # of Visits:  7        Next MD visit: none scheduled  Fall Risk: standard         Precautions: n/a           Medication Changes since last visit?: No   Subjective: Patient reports still good, slight numbness at toes.      O

## 2018-12-26 ENCOUNTER — OFFICE VISIT (OUTPATIENT)
Dept: PHYSICAL THERAPY | Age: 63
End: 2018-12-26
Attending: INTERNAL MEDICINE
Payer: COMMERCIAL

## 2018-12-26 ENCOUNTER — LAB ENCOUNTER (OUTPATIENT)
Dept: LAB | Age: 63
End: 2018-12-26
Attending: INTERNAL MEDICINE
Payer: COMMERCIAL

## 2018-12-26 DIAGNOSIS — D72.810 LYMPHOCYTOPENIA: ICD-10-CM

## 2018-12-26 PROCEDURE — 97110 THERAPEUTIC EXERCISES: CPT | Performed by: PHYSICAL THERAPIST

## 2018-12-26 PROCEDURE — 85025 COMPLETE CBC W/AUTO DIFF WBC: CPT

## 2018-12-26 PROCEDURE — 36415 COLL VENOUS BLD VENIPUNCTURE: CPT

## 2018-12-26 NOTE — PROGRESS NOTES
Diagnosis: R sciatica  Authorized # of Visits:  8       Next MD visit: none scheduled  Fall Risk: standard         Precautions: n/a           Medication Changes since last visit?: No   Subjective: Patient reports still good, slight numbness at toes but in

## 2018-12-28 ENCOUNTER — OFFICE VISIT (OUTPATIENT)
Dept: PHYSICAL THERAPY | Age: 63
End: 2018-12-28
Attending: INTERNAL MEDICINE
Payer: COMMERCIAL

## 2018-12-28 PROCEDURE — 97110 THERAPEUTIC EXERCISES: CPT | Performed by: PHYSICAL THERAPIST

## 2018-12-28 NOTE — PROGRESS NOTES
Diagnosis: R sciatica  Authorized # of Visits:  9/10       Next MD visit: none scheduled  Fall Risk: standard         Precautions: n/a           Medication Changes since last visit?: No   Subjective: Patient reports still light numbness at 3rd, 4th toe, ot

## 2019-01-09 ENCOUNTER — OFFICE VISIT (OUTPATIENT)
Dept: PHYSICAL THERAPY | Age: 64
End: 2019-01-09
Attending: INTERNAL MEDICINE
Payer: COMMERCIAL

## 2019-01-09 DIAGNOSIS — M54.31 SCIATICA OF RIGHT SIDE: ICD-10-CM

## 2019-01-09 PROCEDURE — 97110 THERAPEUTIC EXERCISES: CPT | Performed by: PHYSICAL THERAPIST

## 2019-01-09 NOTE — PROGRESS NOTES
Diagnosis: R sciatica  Authorized # of Visits:  1/8       Next MD visit: none scheduled  Fall Risk: standard         Precautions: n/a           Medication Changes since last visit?: No   Subjective: Patient reports still some numbness in the 3,4 and pinky

## 2019-01-11 ENCOUNTER — APPOINTMENT (OUTPATIENT)
Dept: PHYSICAL THERAPY | Age: 64
End: 2019-01-11
Attending: INTERNAL MEDICINE
Payer: COMMERCIAL

## 2019-01-15 ENCOUNTER — TELEPHONE (OUTPATIENT)
Dept: INTERNAL MEDICINE CLINIC | Facility: CLINIC | Age: 64
End: 2019-01-15

## 2019-01-15 DIAGNOSIS — R20.0 NUMBNESS AND TINGLING OF FOOT: Primary | ICD-10-CM

## 2019-01-15 DIAGNOSIS — R20.2 NUMBNESS AND TINGLING OF FOOT: Primary | ICD-10-CM

## 2019-01-15 NOTE — TELEPHONE ENCOUNTER
Changed from referral to acute. I should see her for this problem. There could be a number of reasons. Please schedule her in a SDS.

## 2019-01-15 NOTE — TELEPHONE ENCOUNTER
Dr. Stella Claire,    Patient called requesting a referral for right foot numbness. Per patient this is being recommending by physical therapist. Please advise and sign off on referral if you agree.       Thank you, Liya Oliva Small-Referral Specialist.

## 2019-01-16 ENCOUNTER — OFFICE VISIT (OUTPATIENT)
Dept: PHYSICAL THERAPY | Age: 64
End: 2019-01-16
Attending: INTERNAL MEDICINE
Payer: COMMERCIAL

## 2019-01-16 PROCEDURE — 97110 THERAPEUTIC EXERCISES: CPT | Performed by: PHYSICAL THERAPIST

## 2019-01-16 NOTE — TELEPHONE ENCOUNTER
Patient called and informed. Phone number given to [address] [place] [address] if referral was not received at the office.

## 2019-01-16 NOTE — TELEPHONE ENCOUNTER
Pt returned call. Reviewed doctor's recommendations as noted below. Pt declined scheduling appt with Dr. Neli Wynn. States she would like a podiatry referral as she has numbness in her right foot below a few toes.  Pt states she saw a podiatrist in the past a

## 2019-01-16 NOTE — PROGRESS NOTES
Diagnosis: R sciatica  Authorized # of Visits:  2/8       Next MD visit: none scheduled  Fall Risk: standard         Precautions: n/a           Medication Changes since last visit?: No   Subjective: Patient reports overall she has been doing well.  States f

## 2019-01-17 DIAGNOSIS — F32.A MILD DEPRESSION: ICD-10-CM

## 2019-01-23 ENCOUNTER — APPOINTMENT (OUTPATIENT)
Dept: PHYSICAL THERAPY | Age: 64
End: 2019-01-23
Attending: INTERNAL MEDICINE
Payer: COMMERCIAL

## 2019-01-30 ENCOUNTER — APPOINTMENT (OUTPATIENT)
Dept: PHYSICAL THERAPY | Age: 64
End: 2019-01-30
Attending: INTERNAL MEDICINE
Payer: COMMERCIAL

## 2019-02-06 ENCOUNTER — OFFICE VISIT (OUTPATIENT)
Dept: PHYSICAL THERAPY | Age: 64
End: 2019-02-06
Attending: INTERNAL MEDICINE
Payer: COMMERCIAL

## 2019-02-06 PROCEDURE — 97110 THERAPEUTIC EXERCISES: CPT | Performed by: PHYSICAL THERAPIST

## 2019-02-06 NOTE — PROGRESS NOTES
Diagnosis: R sciatica  Authorized # of Visits:  3/8       Next MD visit: none scheduled  Fall Risk: standard         Precautions: n/a           Medication Changes since last visit?: No   DISCHARGE SUMMARY  Subjective: Patient reports did get a referral for

## 2019-02-13 ENCOUNTER — APPOINTMENT (OUTPATIENT)
Dept: PHYSICAL THERAPY | Age: 64
End: 2019-02-13
Attending: INTERNAL MEDICINE
Payer: COMMERCIAL

## 2019-08-08 NOTE — TELEPHONE ENCOUNTER
CSS please assist patient in scheduling a follow up appointment - thank you     My chart message also sent to schedule follow up     Please review; protocol failed.     Requested Prescriptions     Pending Prescriptions Disp Refills   • SERTRALINE HCL 50 MG

## 2019-08-23 ENCOUNTER — TELEPHONE (OUTPATIENT)
Dept: INTERNAL MEDICINE CLINIC | Facility: CLINIC | Age: 64
End: 2019-08-23

## 2019-08-23 DIAGNOSIS — Z12.39 BREAST CANCER SCREENING: Primary | ICD-10-CM

## 2019-08-23 NOTE — TELEPHONE ENCOUNTER
[facility], but if that doesn't work for her, please put her in a regular appointment prior to 4 pm.  Do not use res24. Also, please tell her that I have placed the order for her mammogram, so she can schedule that for October.

## 2019-08-23 NOTE — TELEPHONE ENCOUNTER
Pt calling due for PHY last one was Oct 15, 2018 was wandering can she come in before the next phy book which I show Dec 2 is it ok to use a res spot she don't want to go pass Nov because she a mammogram and phy      Please advise

## 2019-09-06 NOTE — TELEPHONE ENCOUNTER
Refill passed per [facility], [facility] protocol.       Refill Protocol Appointment Criteria  · Appointment scheduled in the past 6 months or in the next 3 months  Recent Outpatient Visits            6 months ago Neuroma of foot    Podiatry, Foot & Ankle Surgery

## 2019-09-09 ENCOUNTER — TELEPHONE (OUTPATIENT)
Dept: HEMATOLOGY/ONCOLOGY | Facility: HOSPITAL | Age: 64
End: 2019-09-09

## 2019-09-09 NOTE — TELEPHONE ENCOUNTER
Farhan Tejada calling asking for Mammo order. She wants to make sure right order is placed. Please contact pt when order is placed.  shannon

## 2019-10-04 NOTE — TELEPHONE ENCOUNTER
Refill passed per [facility], [facility] protocol.     Refill Protocol Appointment Criteria  · Appointment scheduled in the past 6 months or in the next 3 months  Recent Outpatient Visits            7 months ago Neuroma of foot    Podiatry, Foot & Ankle Surgery An

## 2019-10-10 ENCOUNTER — TELEPHONE (OUTPATIENT)
Dept: HEMATOLOGY/ONCOLOGY | Facility: HOSPITAL | Age: 64
End: 2019-10-10

## 2019-10-10 NOTE — TELEPHONE ENCOUNTER
Casimiro Zee called to see if Dr. Glenny Alvarado wanted to put a different mammo order in then is currently in the system. She also wanted to know if she needed to have blood drawn prior to the apt.  She asked that she get a call back letting her know if she does and sh

## 2019-10-11 DIAGNOSIS — R92.2 DENSE BREAST TISSUE ON MAMMOGRAM: ICD-10-CM

## 2019-10-11 DIAGNOSIS — Z12.31 ENCOUNTER FOR SCREENING MAMMOGRAM FOR MALIGNANT NEOPLASM OF BREAST: Primary | ICD-10-CM

## 2019-10-11 NOTE — TELEPHONE ENCOUNTER
Spoke with Danielle Reinoso, order for right 2d screening mammo ordered-- no labs needed-- she will see her PCP prior to seeing Dr. Cher Traylor.

## 2019-10-31 ENCOUNTER — OFFICE VISIT (OUTPATIENT)
Dept: HEMATOLOGY/ONCOLOGY | Facility: HOSPITAL | Age: 64
End: 2019-10-31
Attending: INTERNAL MEDICINE
Payer: COMMERCIAL

## 2019-10-31 ENCOUNTER — OFFICE VISIT (OUTPATIENT)
Dept: INTERNAL MEDICINE CLINIC | Facility: CLINIC | Age: 64
End: 2019-10-31

## 2019-10-31 ENCOUNTER — HOSPITAL ENCOUNTER (OUTPATIENT)
Dept: MAMMOGRAPHY | Age: 64
Discharge: HOME | End: 2019-10-31
Attending: INTERNAL MEDICINE
Payer: COMMERCIAL

## 2019-10-31 VITALS
DIASTOLIC BLOOD PRESSURE: 32 MMHG | WEIGHT: 131 LBS | HEART RATE: 63 BPM | OXYGEN SATURATION: 98 % | HEIGHT: 67.5 IN | SYSTOLIC BLOOD PRESSURE: 118 MMHG | TEMPERATURE: 98 F | BODY MASS INDEX: 20.32 KG/M2 | RESPIRATION RATE: 16 BRPM

## 2019-10-31 VITALS
TEMPERATURE: 98 F | HEIGHT: 67.5 IN | WEIGHT: 130 LBS | HEART RATE: 64 BPM | DIASTOLIC BLOOD PRESSURE: 63 MMHG | SYSTOLIC BLOOD PRESSURE: 99 MMHG | RESPIRATION RATE: 16 BRPM | BODY MASS INDEX: 20.17 KG/M2

## 2019-10-31 DIAGNOSIS — M85.859 OSTEOPENIA OF HIP, UNSPECIFIED LATERALITY: ICD-10-CM

## 2019-10-31 DIAGNOSIS — Z12.31 ENCOUNTER FOR SCREENING MAMMOGRAM FOR MALIGNANT NEOPLASM OF BREAST: ICD-10-CM

## 2019-10-31 DIAGNOSIS — G57.61 MORTON'S NEUROMA OF RIGHT FOOT: ICD-10-CM

## 2019-10-31 DIAGNOSIS — Z80.3 FAMILY HISTORY OF BREAST CANCER IN FIRST DEGREE RELATIVE: ICD-10-CM

## 2019-10-31 DIAGNOSIS — Z23 NEED FOR VACCINATION: ICD-10-CM

## 2019-10-31 DIAGNOSIS — Z86.39 HISTORY OF VITAMIN D DEFICIENCY: ICD-10-CM

## 2019-10-31 DIAGNOSIS — M85.80 OSTEOPENIA DETERMINED BY X-RAY: ICD-10-CM

## 2019-10-31 DIAGNOSIS — Z78.0 ASYMPTOMATIC MENOPAUSE: ICD-10-CM

## 2019-10-31 DIAGNOSIS — Z00.00 ROUTINE HEALTH MAINTENANCE: Primary | ICD-10-CM

## 2019-10-31 DIAGNOSIS — F32.A MILD DEPRESSION: ICD-10-CM

## 2019-10-31 DIAGNOSIS — Z17.0 MALIGNANT NEOPLASM OF OVERLAPPING SITES OF LEFT BREAST IN FEMALE, ESTROGEN RECEPTOR POSITIVE (HCC): Primary | ICD-10-CM

## 2019-10-31 DIAGNOSIS — C50.812 MALIGNANT NEOPLASM OF OVERLAPPING SITES OF LEFT BREAST IN FEMALE, ESTROGEN RECEPTOR POSITIVE (HCC): Primary | ICD-10-CM

## 2019-10-31 DIAGNOSIS — R92.2 DENSE BREAST TISSUE ON MAMMOGRAM: ICD-10-CM

## 2019-10-31 PROBLEM — M54.31 SCIATICA OF RIGHT SIDE: Status: RESOLVED | Noted: 2018-11-20 | Resolved: 2019-10-31

## 2019-10-31 PROCEDURE — 90686 IIV4 VACC NO PRSV 0.5 ML IM: CPT | Performed by: INTERNAL MEDICINE

## 2019-10-31 PROCEDURE — 99396 PREV VISIT EST AGE 40-64: CPT | Performed by: INTERNAL MEDICINE

## 2019-10-31 PROCEDURE — 99213 OFFICE O/P EST LOW 20 MIN: CPT | Performed by: INTERNAL MEDICINE

## 2019-10-31 PROCEDURE — 90471 IMMUNIZATION ADMIN: CPT | Performed by: INTERNAL MEDICINE

## 2019-10-31 PROCEDURE — 77063 BREAST TOMOSYNTHESIS BI: CPT | Performed by: INTERNAL MEDICINE

## 2019-10-31 PROCEDURE — 77067 SCR MAMMO BI INCL CAD: CPT | Performed by: INTERNAL MEDICINE

## 2019-10-31 NOTE — ASSESSMENT & PLAN NOTE
Unremarkable exam.  She is up-to-date on her colonoscopy  Immunizations were reviewed. Flu today  Counseled pt regarding diet and exercise.

## 2019-10-31 NOTE — PATIENT INSTRUCTIONS
Pleasefind out if you would like the new recombinant Shingles vaccine (Shingrix). Do fasting labs soon. Fast for 12 hours. Water is okay. Walk in.

## 2019-10-31 NOTE — PROGRESS NOTES
HPI   10/31/2019   Merle Matthews is a 60year old female with a history of pT3 N2 M0 left breast cancer 8/28/1987 who returns for follow-up. Patient is not aware of local chest wall recurrence or symptoms that would suggest metastatic disease.   She is HENT: Positive for dental problem and tinnitus. Negative for hearing loss.          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   Respiratory: Negative for cough and shortness of b OSTEOPENIA; DEXA 4/4/08-NORMAL   • Displacement of lumbar intervertebral disc without myelopathy     L4-5   • Hypertonicity of bladder    • MALIGN NEOPL BREAST NOS ([facility] [address] 11/27/2007   • Malignant neoplasm of breast (female), unspecified site 9/4/87    TRUDY Non-medical: Not on file    Tobacco Use      Smoking status: Never Smoker      Smokeless tobacco: Never Used    Substance and Sexual Activity      Alcohol use:  Yes        Alcohol/week: 0.0 standard drinks        Comment: ONE PER MTH      Drug use: No /32 (BP Location: Left arm, Patient Position: Sitting)   Pulse 63   Temp 98.2 °F (36.8 °C) (Oral)   Resp 16   Ht 1.715 m (5' 7.5\")   Wt 59.4 kg (131 lb)   SpO2 98%   BMI 20.21 kg/m²    HENT:   Head: Normocephalic and atraumatic.    Mouth/Throat: No o Ashby's neuroma of right foot  Mild depression (hcc)     Patient does not have evidence of local recurrence or metastatic hormone receptor positive infiltrating ductal pT3 N2 M0 left breast cancer. She has dense nodular, category c breast tissue.   2D Bone mineral content in the lumbar spine is in the normal range, but decreased 2.1%        since the prior study. The FRAX calculated 10 year risk of any major osteoporotic fracture is 7.1%, hip fracture 0.7%.      Imaging & Referrals:  Mammo

## 2019-10-31 NOTE — PROGRESS NOTES
HPI:    Patient ID: Meghna Talamantes is a 60year old female. Pt is here for a physical.    Pt has orthotics because for her Ashby's neuroma so her toes don't go numb. The old orthotics have a temporary pad and she needs new ones.   Her depression is co POLYPECTOMY 93409 N/A 3/1/2013    Performed by Rachael Sibley MD at [address] [place] [address]  11/21/03    INTERNAL HEMORRHOIDS ; DUE FOR REPEAT IN 8-10 YRS WITH DR Sedrick Reveles   • HYSTERECTOMY      and USO   • MASTECTOMY LEFT kidney stones   • Breast Cancer Self 31       . BP 99/63   Pulse 64   Temp 98.4 °F (36.9 °C) (Oral)   Resp 16   Ht 5' 7.5\" (1.715 m)   Wt 130 lb (59 kg)   BMI 20.06 kg/m²   PHYSICAL EXAM:   Physical Exam   Nursing note and vitals reviewed.    Constitutional colonoscopy  Immunizations were reviewed. Flu today  Counseled pt regarding diet and exercise.            Relevant Orders    CBC WITH DIFFERENTIAL WITH PLATELET    TSH W REFLEX TO FREE T4    COMP METABOLIC PANEL (14)    LIPID PANEL       Unprioritized    O

## 2019-11-01 ENCOUNTER — TELEPHONE (OUTPATIENT)
Dept: HEMATOLOGY/ONCOLOGY | Facility: HOSPITAL | Age: 64
End: 2019-11-01

## 2019-11-02 NOTE — TELEPHONE ENCOUNTER
Call patient with benign mammogram that was done on 10/31/2019 prior to her visit  The skin over the upper outer quadrant of the left breast reconstruction appears changed on my exam although I do not appreciate a discrete palpable lesion.   We will discuss

## 2019-11-07 ENCOUNTER — TELEPHONE (OUTPATIENT)
Dept: HEMATOLOGY/ONCOLOGY | Facility: HOSPITAL | Age: 64
End: 2019-11-07

## 2019-11-07 NOTE — TELEPHONE ENCOUNTER
Deshawn Shetty called because she doesn't see an order for an ultrasound in [facility] yet, so she's not sure when or if she should schedule it. Please advise.

## 2019-11-11 ENCOUNTER — TELEPHONE (OUTPATIENT)
Dept: HEMATOLOGY/ONCOLOGY | Facility: HOSPITAL | Age: 64
End: 2019-11-11

## 2019-11-12 ENCOUNTER — TELEPHONE (OUTPATIENT)
Dept: HEMATOLOGY/ONCOLOGY | Facility: HOSPITAL | Age: 64
End: 2019-11-12

## 2019-11-12 NOTE — TELEPHONE ENCOUNTER
Patient is returning your call and have been getting your messages, so between 5-6 pm is not a good time, Need a callback when the U/S order have been place and ready to be scheduled, please advise.

## 2019-11-14 NOTE — TELEPHONE ENCOUNTER
Spoke with Casimiro Zee-- let her know that order for us breast is in and she can schedule-- number for scheduling provided.

## 2019-11-16 ENCOUNTER — LAB ENCOUNTER (OUTPATIENT)
Dept: LAB | Age: 64
End: 2019-11-16
Attending: INTERNAL MEDICINE
Payer: COMMERCIAL

## 2019-11-16 DIAGNOSIS — Z86.39 HISTORY OF VITAMIN D DEFICIENCY: ICD-10-CM

## 2019-11-16 DIAGNOSIS — Z00.00 ROUTINE HEALTH MAINTENANCE: ICD-10-CM

## 2019-11-16 PROCEDURE — 84443 ASSAY THYROID STIM HORMONE: CPT

## 2019-11-16 PROCEDURE — 80053 COMPREHEN METABOLIC PANEL: CPT

## 2019-11-16 PROCEDURE — 82306 VITAMIN D 25 HYDROXY: CPT

## 2019-11-16 PROCEDURE — 85025 COMPLETE CBC W/AUTO DIFF WBC: CPT

## 2019-11-16 PROCEDURE — 36415 COLL VENOUS BLD VENIPUNCTURE: CPT

## 2019-11-16 PROCEDURE — 80061 LIPID PANEL: CPT

## 2019-11-27 ENCOUNTER — HOSPITAL ENCOUNTER (OUTPATIENT)
Dept: ULTRASOUND IMAGING | Facility: HOSPITAL | Age: 64
Discharge: HOME | End: 2019-11-27
Attending: INTERNAL MEDICINE
Payer: COMMERCIAL

## 2019-11-27 ENCOUNTER — HOSPITAL ENCOUNTER (OUTPATIENT)
Dept: BONE DENSITY | Age: 64
Discharge: HOME | End: 2019-11-27
Attending: INTERNAL MEDICINE
Payer: COMMERCIAL

## 2019-11-27 DIAGNOSIS — M85.80 OSTEOPENIA DETERMINED BY X-RAY: ICD-10-CM

## 2019-11-27 DIAGNOSIS — Z98.890 S/P BREAST RECONSTRUCTION, LEFT: ICD-10-CM

## 2019-11-27 DIAGNOSIS — Z78.0 ASYMPTOMATIC MENOPAUSE: ICD-10-CM

## 2019-11-27 DIAGNOSIS — C50.812 MALIGNANT NEOPLASM OF OVERLAPPING SITES OF LEFT BREAST IN FEMALE, ESTROGEN RECEPTOR POSITIVE (HCC): ICD-10-CM

## 2019-11-27 DIAGNOSIS — Z17.0 MALIGNANT NEOPLASM OF OVERLAPPING SITES OF LEFT BREAST IN FEMALE, ESTROGEN RECEPTOR POSITIVE (HCC): ICD-10-CM

## 2019-11-27 DIAGNOSIS — R23.4 BREAST SKIN CHANGES: ICD-10-CM

## 2019-11-27 PROCEDURE — 76642 ULTRASOUND BREAST LIMITED: CPT | Performed by: INTERNAL MEDICINE

## 2019-11-27 PROCEDURE — 77080 DXA BONE DENSITY AXIAL: CPT | Performed by: INTERNAL MEDICINE

## 2019-12-05 ENCOUNTER — TELEPHONE (OUTPATIENT)
Dept: HEMATOLOGY/ONCOLOGY | Facility: HOSPITAL | Age: 64
End: 2019-12-05

## 2019-12-06 NOTE — TELEPHONE ENCOUNTER
Called patient and left a message that I was placing an order for MRI of the breasts to be done as an outpatient. This is based on the recommendations of the ultrasound.   I asked her to call the office tomorrow and ask for Jenelle Austin if she had any questions

## 2019-12-07 ENCOUNTER — TELEPHONE (OUTPATIENT)
Dept: HEMATOLOGY/ONCOLOGY | Facility: HOSPITAL | Age: 64
End: 2019-12-07

## 2019-12-08 NOTE — TELEPHONE ENCOUNTER
Call to patient - finally able to reach her tonight   Reviewed the lab results including the decrease in her vitamin D and the bone density study. She has 1 sister who has osteopenia/osteoporosis.   Her mother is kyphotic but also has rheumatoid arthritis

## 2019-12-18 ENCOUNTER — TELEPHONE (OUTPATIENT)
Dept: HEMATOLOGY/ONCOLOGY | Facility: HOSPITAL | Age: 64
End: 2019-12-18

## 2019-12-18 DIAGNOSIS — C50.812 MALIGNANT NEOPLASM OF OVERLAPPING SITES OF LEFT BREAST IN FEMALE, ESTROGEN RECEPTOR POSITIVE (HCC): Primary | ICD-10-CM

## 2019-12-18 DIAGNOSIS — Z17.0 MALIGNANT NEOPLASM OF OVERLAPPING SITES OF LEFT BREAST IN FEMALE, ESTROGEN RECEPTOR POSITIVE (HCC): Primary | ICD-10-CM

## 2019-12-18 NOTE — TELEPHONE ENCOUNTER
Spoke with Sy Peña per Dr. Ruth Ann Ferreira instruction, let her know that she would be eligible for updated testing per Roya Gutierrez. Order placed and message sent to schedulers.

## 2019-12-20 ENCOUNTER — HOSPITAL ENCOUNTER (OUTPATIENT)
Dept: MRI IMAGING | Facility: HOSPITAL | Age: 64
Discharge: HOME | End: 2019-12-20
Attending: INTERNAL MEDICINE
Payer: COMMERCIAL

## 2019-12-20 DIAGNOSIS — Z90.12 S/P LEFT MASTECTOMY: ICD-10-CM

## 2019-12-20 DIAGNOSIS — Z98.890 S/P BREAST RECONSTRUCTION, LEFT: ICD-10-CM

## 2019-12-20 DIAGNOSIS — C50.812 CANCER OF OVERLAPPING SITES OF LEFT BREAST (HCC): ICD-10-CM

## 2019-12-20 PROCEDURE — 77049 MRI BREAST C-+ W/CAD BI: CPT | Performed by: INTERNAL MEDICINE

## 2019-12-20 PROCEDURE — A9575 INJ GADOTERATE MEGLUMI 0.1ML: HCPCS | Performed by: INTERNAL MEDICINE

## 2020-01-05 ENCOUNTER — TELEPHONE (OUTPATIENT)
Dept: HEMATOLOGY/ONCOLOGY | Facility: HOSPITAL | Age: 65
End: 2020-01-05

## 2020-01-06 ENCOUNTER — TELEPHONE (OUTPATIENT)
Dept: HEMATOLOGY/ONCOLOGY | Facility: HOSPITAL | Age: 65
End: 2020-01-06

## 2020-01-06 NOTE — TELEPHONE ENCOUNTER
Called Joan to discuss MRI, bone density  I know she is caring for her mother on Sunday, but thought she might be free by this time. Encouraged her to call me early or late  In the day.

## 2020-01-08 ENCOUNTER — TELEPHONE (OUTPATIENT)
Dept: HEMATOLOGY/ONCOLOGY | Facility: HOSPITAL | Age: 65
End: 2020-01-08

## 2020-01-09 NOTE — TELEPHONE ENCOUNTER
Spoke with patient while she was waiting at a restaurant  Reviewed MRI scan and mammograms  Reviewed vitamin D level which was just below 30  Patient did take Fosamax in years past but became concerned about the risk associated with Fosamax and stopped  He

## 2020-01-19 DIAGNOSIS — R92.8 ABNORMAL MRI, BREAST: Primary | ICD-10-CM

## 2020-01-19 DIAGNOSIS — Z17.0 MALIGNANT NEOPLASM OF OVERLAPPING SITES OF LEFT BREAST IN FEMALE, ESTROGEN RECEPTOR POSITIVE (HCC): ICD-10-CM

## 2020-01-19 DIAGNOSIS — C50.812 MALIGNANT NEOPLASM OF OVERLAPPING SITES OF LEFT BREAST IN FEMALE, ESTROGEN RECEPTOR POSITIVE (HCC): ICD-10-CM

## 2020-03-05 ENCOUNTER — NURSE TRIAGE (OUTPATIENT)
Dept: OTHER | Age: 65
End: 2020-03-05

## 2020-03-05 ENCOUNTER — OFFICE VISIT (OUTPATIENT)
Dept: FAMILY MEDICINE CLINIC | Facility: CLINIC | Age: 65
End: 2020-03-05

## 2020-03-05 VITALS
HEART RATE: 84 BPM | BODY MASS INDEX: 20.38 KG/M2 | WEIGHT: 131.38 LBS | RESPIRATION RATE: 15 BRPM | HEIGHT: 67.5 IN | SYSTOLIC BLOOD PRESSURE: 113 MMHG | TEMPERATURE: 98 F | DIASTOLIC BLOOD PRESSURE: 67 MMHG

## 2020-03-05 DIAGNOSIS — R05.9 COUGH: ICD-10-CM

## 2020-03-05 DIAGNOSIS — J06.9 VIRAL UPPER RESPIRATORY INFECTION: Primary | ICD-10-CM

## 2020-03-05 LAB
FLUAV + FLUBV RNA SPEC NAA+PROBE: NEGATIVE
FLUAV + FLUBV RNA SPEC NAA+PROBE: NEGATIVE
FLUAV + FLUBV RNA SPEC NAA+PROBE: POSITIVE

## 2020-03-05 PROCEDURE — 99213 OFFICE O/P EST LOW 20 MIN: CPT | Performed by: PHYSICIAN ASSISTANT

## 2020-03-05 RX ORDER — OSELTAMIVIR PHOSPHATE 75 MG/1
75 CAPSULE ORAL 2 TIMES DAILY
Qty: 10 CAPSULE | Refills: 0 | Status: SHIPPED | OUTPATIENT
Start: 2020-03-05 | End: 2020-03-10

## 2020-03-05 NOTE — PROGRESS NOTES
HPI:     Cough   This is a new problem. The current episode started in the past 7 days. The cough is non-productive. Associated symptoms include chills, a fever, headaches, nasal congestion and a sore throat.  Pertinent negatives include no chest pain, ear STAGE 3; CAF 6 CYCLES ; 12+ NODES; 30 XRT TX   • OSTEOPENIA 7/2010 borderline osteopenia (stopped Fosamax at this time)   • Osteoporosis    • OTHER DISEASES DEXA 2008 (normal)   • Unspecified vitamin D deficiency 4/16/2009   • Vitamin D deficiency 4/16/ stones   • Breast Cancer Self 36       Social History:   Social History    Socioeconomic History      Marital status:       Spouse name: Not on file      Number of children: Not on file      Years of education: Not on file      Highest education le shortness of breath and wheezing. Cardiovascular: Negative for chest pain and palpitations. Gastrointestinal: Negative for nausea, vomiting, abdominal pain, diarrhea, constipation, blood in stool and abdominal distention. Skin: Negative for rash. if coughing/sneezing, regular hand washing with soap and water, adequate hydration and nutrition. Use tylenol or Ibuprofen as directed for fever and body ache and warm humidified air. Start Tamiflu 75 mg PO BID for 5 days.              Other Visit Diagnose

## 2020-03-05 NOTE — TELEPHONE ENCOUNTER
Action Requested: Summary for Provider     []  Critical Lab, Recommendations Needed  [] Need Additional Advice  [x]   FYI    []   Need Orders  [] Need Medications Sent to Pharmacy  []  Other     SUMMARY: Patient has a cough, fever, sore throat, headache, b

## 2020-04-10 NOTE — TELEPHONE ENCOUNTER
Please schedule a telephone or video appointment. If she downloads the [employer] luis alfredo, we can do a video appointment. I have availability tomorrow 4/11 in the morning, or next week.

## 2020-04-11 ENCOUNTER — VIRTUAL PHONE E/M (OUTPATIENT)
Dept: INTERNAL MEDICINE CLINIC | Facility: CLINIC | Age: 65
End: 2020-04-11

## 2020-04-11 DIAGNOSIS — M85.80 OSTEOPENIA, UNSPECIFIED LOCATION: ICD-10-CM

## 2020-04-11 DIAGNOSIS — F32.A MILD DEPRESSION: Primary | ICD-10-CM

## 2020-04-11 DIAGNOSIS — Z00.00 ROUTINE HEALTH MAINTENANCE: ICD-10-CM

## 2020-04-11 PROBLEM — J06.9 VIRAL UPPER RESPIRATORY INFECTION: Status: RESOLVED | Noted: 2020-03-05 | Resolved: 2020-04-11

## 2020-04-11 PROBLEM — Z78.0 ASYMPTOMATIC MENOPAUSE: Status: RESOLVED | Noted: 2019-10-31 | Resolved: 2020-04-11

## 2020-04-11 PROCEDURE — 99213 OFFICE O/P EST LOW 20 MIN: CPT | Performed by: INTERNAL MEDICINE

## 2020-04-11 NOTE — PROGRESS NOTES
Virtual/Telephone Check-In    Roya Perry verbally consents to a [employer] on 04/11/20. Patient understands and accepts financial responsibility for any deductible, co-insurance and/or co-pays associated with this service. WITH ONE SO FOR CYST       Past Medical History:   Diagnosis Date   • Alopecia areata     HAS HAD INJECTIONS OF STEROIDS INTO THE SCALP WITH DERM-DR NATHALIE DUTTON   • Breast CA [facility])    • Cancer of overlapping sites of left female breast [facility] 9/4/19 RA;TB;PE &DVT   • Cancer Maternal Grandmother         COLON   • Other (Other) Maternal Grandfather         CVA   • Cancer Paternal Grandmother         LUNG    • Other (Other) Sister         OSTEOPENIA   • Breast Cancer Daughter 44   • Kidney Disease Bro

## 2020-04-11 NOTE — PATIENT INSTRUCTIONS
Please schedule your next appointment for a physical in early November. Do fasting labs 2-4 days before that appointment. Fast for 12 hours. Water and meds are okay. Walk in.

## 2020-07-23 ENCOUNTER — TELEPHONE (OUTPATIENT)
Dept: HEMATOLOGY/ONCOLOGY | Facility: HOSPITAL | Age: 65
End: 2020-07-23

## 2020-07-23 NOTE — TELEPHONE ENCOUNTER
Spoke with Deshawn Shetty, reminder her that her MRI breast was due in June-- number provided for central scheduling-- she will call to schedule.

## 2020-08-03 ENCOUNTER — GENETICS ENCOUNTER (OUTPATIENT)
Dept: GENETICS | Facility: HOSPITAL | Age: 65
End: 2020-08-03
Attending: GENETIC COUNSELOR, MS
Payer: COMMERCIAL

## 2020-08-03 DIAGNOSIS — Z80.0 FAMILY HISTORY OF MALIGNANT NEOPLASM OF GASTROINTESTINAL TRACT: ICD-10-CM

## 2020-08-03 DIAGNOSIS — Z85.3 PERSONAL HISTORY OF MALIGNANT NEOPLASM OF BREAST: Primary | ICD-10-CM

## 2020-08-03 DIAGNOSIS — Z80.3 FAMILY HISTORY OF BREAST CANCER IN FIRST DEGREE RELATIVE: ICD-10-CM

## 2020-08-03 PROCEDURE — 96040 HC GENETIC COUNSELING EA 30 MIN: CPT | Performed by: GENETIC COUNSELOR, MS

## 2020-08-04 ENCOUNTER — HOSPITAL ENCOUNTER (OUTPATIENT)
Dept: MRI IMAGING | Facility: HOSPITAL | Age: 65
Discharge: HOME | End: 2020-08-04
Attending: INTERNAL MEDICINE
Payer: COMMERCIAL

## 2020-08-04 DIAGNOSIS — R92.8 ABNORMAL MRI, BREAST: ICD-10-CM

## 2020-08-04 DIAGNOSIS — Z17.0 MALIGNANT NEOPLASM OF OVERLAPPING SITES OF LEFT BREAST IN FEMALE, ESTROGEN RECEPTOR POSITIVE (HCC): ICD-10-CM

## 2020-08-04 DIAGNOSIS — C50.812 MALIGNANT NEOPLASM OF OVERLAPPING SITES OF LEFT BREAST IN FEMALE, ESTROGEN RECEPTOR POSITIVE (HCC): ICD-10-CM

## 2020-08-04 LAB — CREAT BLD-MCNC: 0.9 MG/DL (ref 0.55–1.02)

## 2020-08-04 PROCEDURE — 77049 MRI BREAST C-+ W/CAD BI: CPT | Performed by: INTERNAL MEDICINE

## 2020-08-04 PROCEDURE — A9575 INJ GADOTERATE MEGLUMI 0.1ML: HCPCS | Performed by: INTERNAL MEDICINE

## 2020-08-04 PROCEDURE — 82565 ASSAY OF CREATININE: CPT

## 2020-08-05 PROBLEM — Z85.3 PERSONAL HISTORY OF MALIGNANT NEOPLASM OF BREAST: Status: ACTIVE | Noted: 2020-08-05

## 2020-08-05 NOTE — PROGRESS NOTES
Medical History: Ms. Bharath Cody is a 47-year-old woman who was referred for genetic counseling due to her personal and family history of early-onset breast cancer.   Ms. Bharath Cody was diagnosed at 36 with breast cancer and was treated by a bilateral mastectomy, chem malignancies on her maternal side. On her paternal side of the family, she reports that her father was diagnosed with prostate cancer at age [de-identified] and with CLL in his 72’s but attributes these to arsenic exposure.   Otherwise her paternal family history is not breast cancer - but mutations in these other genes are statistically less often seen in hereditary breast cancer.   Although she has had negative BRCA1/2 testing performed, there is a reasonable chance that another gene could be mutated and the cause of her be received prior to moving forward with testing. Turn-around-time is approximately 2-3 weeks from the time the sample is received. Our office will call Ms. Segun Doran as soon as the authorization has been approved.   Assuming testing is pursued, I will contac

## 2020-08-06 ENCOUNTER — TELEPHONE (OUTPATIENT)
Dept: GENETICS | Facility: HOSPITAL | Age: 65
End: 2020-08-06

## 2020-08-06 NOTE — TELEPHONE ENCOUNTER
Shared with Suzy Amanda that her genetic testing had been authorized by her insurance. She prefers a saliva-based test, so I will order that. Reminded her of restrictions prior to submitting sample and turn-around-time. She was appreciative of the call.

## 2020-08-11 ENCOUNTER — TELEPHONE (OUTPATIENT)
Dept: HEMATOLOGY/ONCOLOGY | Facility: HOSPITAL | Age: 65
End: 2020-08-11

## 2020-08-12 ENCOUNTER — TELEPHONE (OUTPATIENT)
Dept: HEMATOLOGY/ONCOLOGY | Facility: HOSPITAL | Age: 65
End: 2020-08-12

## 2020-08-12 NOTE — TELEPHONE ENCOUNTER
Patient did turn in the saliva test for genetic testing. I did confirm that she understood the information regarding the MRI scan results. She is doing well working from home but unfortunately needs to sit more since she does not have a standing desk.   Radha Aguirre

## 2020-08-12 NOTE — TELEPHONE ENCOUNTER
Called mobile number and reviewed MRI scan in the event that she had any questions regarding the result  There is no evidence for malignancy in the left chest wall or the right breast

## 2020-08-24 ENCOUNTER — TELEPHONE (OUTPATIENT)
Dept: GENETICS | Facility: HOSPITAL | Age: 65
End: 2020-08-24

## 2020-08-24 NOTE — TELEPHONE ENCOUNTER
Reported genetic testing results to Brady over phone.  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

## 2020-10-05 ENCOUNTER — HOSPITAL ENCOUNTER (OUTPATIENT)
Age: 65
Discharge: STILL ADMITTED OR ADMINISTRATIVE | End: 2020-10-05
Payer: COMMERCIAL

## 2020-10-06 ENCOUNTER — TELEPHONE (OUTPATIENT)
Dept: HEMATOLOGY/ONCOLOGY | Facility: HOSPITAL | Age: 65
End: 2020-10-06

## 2020-10-07 ENCOUNTER — TELEPHONE (OUTPATIENT)
Dept: RHEUMATOLOGY | Facility: CLINIC | Age: 65
End: 2020-10-07

## 2020-10-07 DIAGNOSIS — Z17.0 MALIGNANT NEOPLASM OF OVERLAPPING SITES OF LEFT BREAST IN FEMALE, ESTROGEN RECEPTOR POSITIVE (HCC): Primary | ICD-10-CM

## 2020-10-07 DIAGNOSIS — C50.812 MALIGNANT NEOPLASM OF OVERLAPPING SITES OF LEFT BREAST IN FEMALE, ESTROGEN RECEPTOR POSITIVE (HCC): Primary | ICD-10-CM

## 2020-10-07 NOTE — TELEPHONE ENCOUNTER
Dr. Hong Marrero, patient is requesting a referral to Dr. Debbie Shell. Referral has been pended, please advise.

## 2020-10-07 NOTE — TELEPHONE ENCOUNTER
Patient is scheduled to see Dr. Jennifer Bedolla on 11/5 for her follow up. Patient states that she will need a new referral for the visit. Please advise and contact with status update.

## 2020-10-10 DIAGNOSIS — F32.A MILD DEPRESSION: ICD-10-CM

## 2020-10-12 ENCOUNTER — IMMUNIZATION (OUTPATIENT)
Dept: INTERNAL MEDICINE CLINIC | Facility: CLINIC | Age: 65
End: 2020-10-12

## 2020-10-12 DIAGNOSIS — Z23 NEED FOR VACCINATION: ICD-10-CM

## 2020-10-12 PROCEDURE — 90686 IIV4 VACC NO PRSV 0.5 ML IM: CPT | Performed by: INTERNAL MEDICINE

## 2020-10-12 PROCEDURE — 90471 IMMUNIZATION ADMIN: CPT | Performed by: INTERNAL MEDICINE

## 2020-10-19 ENCOUNTER — NURSE ONLY (OUTPATIENT)
Dept: INTERNAL MEDICINE CLINIC | Facility: CLINIC | Age: 65
End: 2020-10-19

## 2020-10-19 DIAGNOSIS — Z23 NEED FOR VACCINATION: Primary | ICD-10-CM

## 2020-10-19 PROCEDURE — 90471 IMMUNIZATION ADMIN: CPT | Performed by: INTERNAL MEDICINE

## 2020-10-19 PROCEDURE — 90750 HZV VACC RECOMBINANT IM: CPT | Performed by: INTERNAL MEDICINE

## 2020-10-19 NOTE — PROGRESS NOTES
Pt. Is present for shingrix vaccine first dose. Verified pt. Name, , medication. admnistered shinngrix vaccine on left deltoid muscle. Pt. toletated injection well. Instructed pt.  Make a nurse visit appointment for 2nd dose 2- 6 months after the f

## 2020-11-02 ENCOUNTER — LAB ENCOUNTER (OUTPATIENT)
Dept: LAB | Age: 65
End: 2020-11-02
Attending: INTERNAL MEDICINE
Payer: COMMERCIAL

## 2020-11-02 DIAGNOSIS — Z00.00 ROUTINE HEALTH MAINTENANCE: ICD-10-CM

## 2020-11-02 PROCEDURE — 80061 LIPID PANEL: CPT

## 2020-11-02 PROCEDURE — 80053 COMPREHEN METABOLIC PANEL: CPT

## 2020-11-02 PROCEDURE — 85025 COMPLETE CBC W/AUTO DIFF WBC: CPT

## 2020-11-02 PROCEDURE — 36415 COLL VENOUS BLD VENIPUNCTURE: CPT

## 2020-11-02 PROCEDURE — 84443 ASSAY THYROID STIM HORMONE: CPT

## 2020-11-05 ENCOUNTER — OFFICE VISIT (OUTPATIENT)
Dept: HEMATOLOGY/ONCOLOGY | Facility: HOSPITAL | Age: 65
End: 2020-11-05
Attending: GENETIC COUNSELOR, MS
Payer: COMMERCIAL

## 2020-11-05 VITALS
OXYGEN SATURATION: 98 % | SYSTOLIC BLOOD PRESSURE: 123 MMHG | BODY MASS INDEX: 18.96 KG/M2 | RESPIRATION RATE: 16 BRPM | HEIGHT: 67.52 IN | DIASTOLIC BLOOD PRESSURE: 54 MMHG | HEART RATE: 60 BPM | TEMPERATURE: 97 F | WEIGHT: 123.63 LBS

## 2020-11-05 DIAGNOSIS — E55.9 VITAMIN D DEFICIENCY: ICD-10-CM

## 2020-11-05 DIAGNOSIS — F32.A MILD DEPRESSION: ICD-10-CM

## 2020-11-05 DIAGNOSIS — Z17.0 MALIGNANT NEOPLASM OF OVERLAPPING SITES OF LEFT BREAST IN FEMALE, ESTROGEN RECEPTOR POSITIVE (HCC): Primary | ICD-10-CM

## 2020-11-05 DIAGNOSIS — M85.852 OSTEOPENIA OF NECK OF LEFT FEMUR: ICD-10-CM

## 2020-11-05 DIAGNOSIS — Z80.3 FAMILY HISTORY OF BREAST CANCER IN FIRST DEGREE RELATIVE: ICD-10-CM

## 2020-11-05 DIAGNOSIS — C50.812 MALIGNANT NEOPLASM OF OVERLAPPING SITES OF LEFT BREAST IN FEMALE, ESTROGEN RECEPTOR POSITIVE (HCC): Primary | ICD-10-CM

## 2020-11-05 PROCEDURE — 99214 OFFICE O/P EST MOD 30 MIN: CPT | Performed by: INTERNAL MEDICINE

## 2020-11-05 NOTE — PROGRESS NOTES
HPI   11/5/2020   Krysten Torres is a 63year old female with a history of pT3 N2 M0 left breast cancer 8/28/1987 who returns for follow-up. Patient is not aware of local chest wall recurrence or symptoms that would suggest metastatic disease.       Donna Willams Clinical: T3, N2, M0 - Unsigned      Pathologic: Stage IIIA (T3, N2, M0) - Signed by Mary Lange on 11/1/2015    Oncology History   Malignant neoplasm of overlapping sites of left breast in female, estrogen receptor positive ([facility] [address]   8/28/1987 Surgery vomiting. Genitourinary: Positive for frequency and urgency. Negative for dysuria and hematuria.         Possible change in symptoms with med change - waits too long - then urgency -  Some incontinence - previously better controlled with vesicare   Muscul • Osteopenia of neck of left femur 11/5/2016   • Osteoporosis    • OTHER DISEASES DEXA 2008 (normal)   • Unspecified vitamin D deficiency 4/16/2009   • Vitamin D deficiency 4/16/2009     Past Surgical History:   Procedure Laterality Date   • BREAST SURGE Not on file        Minutes per session: Not on file      Stress: Not on file    Relationships      Social connections        Talks on phone: Not on file        Gets together: Not on file        Attends Yazidism service: Not on file        Active member of SpO2 98%   BMI 19.06 kg/m²    HENT:   Head: Normocephalic and atraumatic. Mouth/Throat: No oropharyngeal exudate. Eyes: Pupils are equal, round, and reactive to light. Conjunctivae and EOM are normal. No scleral icterus. Neck: Normal range of motion. contrast bilateral breasts 8/4/2020 did not reveal any abnormality in the lateral anterior aspect of the reconstructed left breast.  The implant was intact without extracapsular extravasation.   There was no MRI evidence of malignancy in either breast or ax lateral anterior aspect of the reconstructed left breast, representing patient reported palpable mass/rippling. There is no MRI   evidence of abnormality in this region. The implant appears intact. No extracapsular extravasation is noted.   There is no s personal history.     Component      Latest Ref Rng & Units 11/2/2020 11/16/2019   WBC      4.0 - 11.0 x10(3) uL 4.0 3.6 (L)   RBC      3.80 - 5.30 x10(6)uL 4.48 4.59   Hemoglobin      12.0 - 16.0 g/dL 13.9 14.4   Hematocrit      35.0 - 48.0 % 43.0 44.3   M g/dL 3.6 3.2   A/G Ratio      1.0 - 2.0 1.1 1.2   Patient Fasting?        Yes Yes   TSH      0.358 - 3.740 mIU/mL 3.190 2.770   Vitamin D, 25OH, Total      30.0 - 100.0 ng/mL  29.1 (L)     =========================================================  10/23/201

## 2020-11-13 ENCOUNTER — TELEPHONE (OUTPATIENT)
Dept: INTERNAL MEDICINE CLINIC | Facility: CLINIC | Age: 65
End: 2020-11-13

## 2020-11-13 ENCOUNTER — HOSPITAL ENCOUNTER (OUTPATIENT)
Age: 65
Discharge: HOME | End: 2020-11-13
Payer: COMMERCIAL

## 2020-11-13 VITALS
TEMPERATURE: 98 F | RESPIRATION RATE: 16 BRPM | SYSTOLIC BLOOD PRESSURE: 128 MMHG | HEART RATE: 71 BPM | DIASTOLIC BLOOD PRESSURE: 62 MMHG | OXYGEN SATURATION: 99 %

## 2020-11-13 DIAGNOSIS — Z20.828 EXPOSURE TO SARS-ASSOCIATED CORONAVIRUS: Primary | ICD-10-CM

## 2020-11-13 DIAGNOSIS — Z20.822 ENCOUNTER FOR LABORATORY TESTING FOR COVID-19 VIRUS: Primary | ICD-10-CM

## 2020-11-13 PROCEDURE — 99201 OFFICE/OUTPT VISIT,NEW,LEVL I: CPT | Performed by: NURSE PRACTITIONER

## 2020-11-13 NOTE — TELEPHONE ENCOUNTER
Patients is asking for COVID test.  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 She was last with her on Sunday, 11-8-2020  Mother tested positive and was informed today. Patient is asymptomatic. [facility] order pended.     Tj

## 2020-11-13 NOTE — TELEPHONE ENCOUNTER
Verified name and . Patient informed that COVID test. States she already has the number for central scheduling.

## 2020-11-14 NOTE — ED PROVIDER NOTES
Patient presents with:  Testing      HPI:     Jm Barker is a 63year old female who presents for a COVID test. She denies any symptoms or complaints. She had a possible exposure to her mother. She appears well and nontoxic.   [address] [facility] [address]  [address] [facility]        Alcohol/week: 0.0 standard drinks        Comment: ONE PER MTH      Drug use: No      Sexual activity: Not on file    Lifestyle      Physical activity        Days per week: Not on file        Minutes per session: Not on file      Stress: Not on f this visit:  No results found for this or any previous visit (from the past 10 hour(s)). MDM:   The COVID test is pending. She will follow up as needed with her PMD.    Diagnosis:    ICD-10-CM    1.  Encounter for laboratory testing for COVID-19 virus  Z

## 2020-11-16 ENCOUNTER — TELEPHONE (OUTPATIENT)
Dept: INTERNAL MEDICINE CLINIC | Facility: CLINIC | Age: 65
End: 2020-11-16

## 2020-11-16 NOTE — TELEPHONE ENCOUNTER
Pt looking for COVID-19 results. Informed they will populate in her mychart and we will also call with results. Informed turn-around-time is about 72 hrs.

## 2020-12-28 ENCOUNTER — PATIENT MESSAGE (OUTPATIENT)
Dept: INTERNAL MEDICINE CLINIC | Facility: CLINIC | Age: 65
End: 2020-12-28

## 2020-12-28 NOTE — TELEPHONE ENCOUNTER
From: Jm aBrker  To: Nick Simpson MD  Sent: 12/28/2020 12:34 PM CST  Subject: Other    I would like to get my second (follow-up) shingles vaccine during my scheduled appt.  for my annual checkup on Jan. 11. Asking in advance in case notice is needed

## 2021-01-11 ENCOUNTER — OFFICE VISIT (OUTPATIENT)
Dept: INTERNAL MEDICINE CLINIC | Facility: CLINIC | Age: 66
End: 2021-01-11

## 2021-01-11 VITALS
DIASTOLIC BLOOD PRESSURE: 69 MMHG | WEIGHT: 124 LBS | HEART RATE: 76 BPM | BODY MASS INDEX: 19.01 KG/M2 | SYSTOLIC BLOOD PRESSURE: 126 MMHG | HEIGHT: 67.52 IN

## 2021-01-11 DIAGNOSIS — Z00.00 ROUTINE HEALTH MAINTENANCE: Primary | ICD-10-CM

## 2021-01-11 DIAGNOSIS — Z23 NEED FOR VACCINATION: ICD-10-CM

## 2021-01-11 DIAGNOSIS — F32.A MILD DEPRESSION: ICD-10-CM

## 2021-01-11 DIAGNOSIS — Z85.3 PERSONAL HISTORY OF MALIGNANT NEOPLASM OF BREAST: ICD-10-CM

## 2021-01-11 PROCEDURE — 90750 HZV VACC RECOMBINANT IM: CPT | Performed by: INTERNAL MEDICINE

## 2021-01-11 PROCEDURE — 3008F BODY MASS INDEX DOCD: CPT | Performed by: INTERNAL MEDICINE

## 2021-01-11 PROCEDURE — 3078F DIAST BP <80 MM HG: CPT | Performed by: INTERNAL MEDICINE

## 2021-01-11 PROCEDURE — 90471 IMMUNIZATION ADMIN: CPT | Performed by: INTERNAL MEDICINE

## 2021-01-11 PROCEDURE — 99396 PREV VISIT EST AGE 40-64: CPT | Performed by: INTERNAL MEDICINE

## 2021-01-11 PROCEDURE — 3074F SYST BP LT 130 MM HG: CPT | Performed by: INTERNAL MEDICINE

## 2021-01-11 RX ORDER — ACETAMINOPHEN 160 MG
50 TABLET,DISINTEGRATING ORAL DAILY
COMMUNITY
Start: 2020-01-10

## 2021-01-11 NOTE — ASSESSMENT & PLAN NOTE
Unremarkable exam.  She is up-to-date on her colonoscopy  Immunizations were reviewed. Shingrix today. Counseled pt regarding diet and exercise. Reviewed labs with pt.

## 2021-01-11 NOTE — PROGRESS NOTES
HPI:    Patient ID: Merle Matthews is a 63year old female. Pt is here for a physical and for refills of sertraline. She is doing okay. She lives alone and works at home, which is difficult during the pandemic.       Past Surgical History:   Procedure femur 11/5/2016   • Osteoporosis    • OTHER DISEASES DEXA 2008 (normal)   • Unspecified vitamin D deficiency 4/16/2009   • Vitamin D deficiency 4/16/2009          Current Outpatient Medications   Medication Sig Dispense Refill   • Vitamin D3 50 MCG (2000 U Negative for cough, shortness of breath and wheezing. Cardiovascular: Negative for chest pain and palpitations. Gastrointestinal: Negative for nausea, vomiting, abdominal pain and blood in stool.    Endocrine: Negative for cold intolerance and heat int normal.  Bladder is not distended. Uterus surgically absent. Ovaries not palpable. Musculoskeletal: Normal range of motion. Lymphadenopathy:     She has no cervical adenopathy. Neurological: She is alert and oriented to person, place, and time.  No

## 2021-06-29 ENCOUNTER — HOSPITAL ENCOUNTER (OUTPATIENT)
Age: 66
Discharge: HOME | End: 2021-06-29
Payer: COMMERCIAL

## 2021-06-29 VITALS
RESPIRATION RATE: 18 BRPM | OXYGEN SATURATION: 99 % | SYSTOLIC BLOOD PRESSURE: 104 MMHG | DIASTOLIC BLOOD PRESSURE: 58 MMHG | HEART RATE: 78 BPM | TEMPERATURE: 98 F

## 2021-06-29 DIAGNOSIS — J02.0 STREP PHARYNGITIS: Primary | ICD-10-CM

## 2021-06-29 PROCEDURE — 99213 OFFICE O/P EST LOW 20 MIN: CPT

## 2021-06-29 PROCEDURE — 87880 STREP A ASSAY W/OPTIC: CPT

## 2021-06-29 RX ORDER — AZITHROMYCIN 500 MG/1
500 TABLET, FILM COATED ORAL DAILY
Qty: 5 TABLET | Refills: 0 | Status: SHIPPED | OUTPATIENT
Start: 2021-06-29 | End: 2021-07-04

## 2021-06-29 NOTE — ED INITIAL ASSESSMENT (HPI)
Grandchildren strep +. Patient with sore throat and fatigue starting yesterday. No fever. Denies congestion or cough.

## 2021-06-29 NOTE — ED PROVIDER NOTES
Patient Seen in: [facility]    History   CC: sore throat  HPI: Lisha Brood 63year old female  who presents c/o sore throat which began yesterday continuing into today.   2 grandchildren over the weekend who have had recent strep infections 09/1987   • OOPHORECTOMY      per NG: x1   • RADIATION LEFT Left 1988   • REDUCTION RIGHT  1988   • SPECIAL SERVICE OR REPORT      B/L BUNIONECTOMIES   • SPECIAL SERVICE OR REPORT  AGE 36    Left MRM FOR STAGE 3 BREAST CA   • SPECIAL SERVICE OR REPORT  AGE 7 days. Vitamin D3 50 MCG (2000 UT) Oral Cap,  Take 50 mcg by mouth daily. Sertraline HCl 50 MG Oral Tab,  Take 1 tablet (50 mg total) by mouth daily. Calcium Carbonate-Vitamin D (CALCIUM 500 + D OR),  Take by mouth.    Probiotic Product (ALIGN) Oral pharyngitis instructions reviewed with patient, strep precautions, antibiotics, over-the-counter as well as nonpharmacologic treatment modalities, follow-up and return/ED precautions reviewed.   Patient has reaction as diffuse hives reported with penicillin

## 2021-07-01 ENCOUNTER — NURSE TRIAGE (OUTPATIENT)
Dept: INTERNAL MEDICINE CLINIC | Facility: CLINIC | Age: 66
End: 2021-07-01

## 2021-07-01 NOTE — TELEPHONE ENCOUNTER
Action Requested: Summary for Provider     []  Critical Lab, Recommendations Needed  [] Need Additional Advice  []   FYI    []   Need Orders  [] Need Medications Sent to Pharmacy  []  Other     SUMMARY: c/o cold was diagnosed with strep on Tuesday at [place] [id number]

## 2021-07-12 DIAGNOSIS — F32.A MILD DEPRESSION: ICD-10-CM

## 2021-09-13 ENCOUNTER — TELEPHONE (OUTPATIENT)
Dept: HEMATOLOGY/ONCOLOGY | Facility: HOSPITAL | Age: 66
End: 2021-09-13

## 2021-09-13 DIAGNOSIS — C50.812 MALIGNANT NEOPLASM OF OVERLAPPING SITES OF LEFT BREAST IN FEMALE, ESTROGEN RECEPTOR POSITIVE (HCC): Primary | ICD-10-CM

## 2021-09-13 DIAGNOSIS — Z12.39 SCREENING FOR MALIGNANT NEOPLASM OF BREAST: ICD-10-CM

## 2021-09-13 DIAGNOSIS — Z17.0 MALIGNANT NEOPLASM OF OVERLAPPING SITES OF LEFT BREAST IN FEMALE, ESTROGEN RECEPTOR POSITIVE (HCC): Primary | ICD-10-CM

## 2021-09-13 NOTE — TELEPHONE ENCOUNTER
Called to notify pt that mammogram order had been entered and f/u was needed around oct/nov.   Pt verbalized understanding and will call to schedule both scan and f/u

## 2021-10-12 DIAGNOSIS — F32.A MILD DEPRESSION: ICD-10-CM

## 2021-10-12 NOTE — TELEPHONE ENCOUNTER
Patient is calling to follow up. Patient scheduled a virtual video follow up with Dr. Germán Michael for 10/20/2021. Patient states she only has 2-3 days left of medication.

## 2021-10-12 NOTE — TELEPHONE ENCOUNTER
Please review. Protocol failed / No protocol. CSS- please assist patient in scheduling appointment.     90 day supply with no refill already given  Requested Prescriptions   Pending Prescriptions Disp Refills    SERTRALINE 50 MG Oral Tab [Pharmacy Med Nam

## 2021-10-13 NOTE — TELEPHONE ENCOUNTER
Patient now has video visit scheduled for 10/20/2021    Please advise on refill prior to appt, as patient only has a few tablets remaining    Medication pended for approval/quantity

## 2021-10-20 ENCOUNTER — TELEMEDICINE (OUTPATIENT)
Dept: INTERNAL MEDICINE CLINIC | Facility: CLINIC | Age: 66
End: 2021-10-20

## 2021-10-20 DIAGNOSIS — M25.559 HIP PAIN: ICD-10-CM

## 2021-10-20 DIAGNOSIS — Z23 NEED FOR VACCINATION: ICD-10-CM

## 2021-10-20 DIAGNOSIS — F32.A MILD DEPRESSION: Primary | ICD-10-CM

## 2021-10-20 PROCEDURE — 99213 OFFICE O/P EST LOW 20 MIN: CPT | Performed by: INTERNAL MEDICINE

## 2021-10-20 NOTE — ASSESSMENT & PLAN NOTE
Pt only has pain when she lays on 1 side and the pain resolves when she changes position. She does not want any medication for this. She will come in sooner if this bothers her more, otherwise she will follow up in January at her regular appointment.

## 2021-10-20 NOTE — PROGRESS NOTES
This visit is conducted using Telemedicine with live, interactive video and audio. Patient has been referred to the [facility] website at [email or web address] to review the yearly Consent to Treat document.     Patient understands and accepts financial r • Breast CA ([facility] [address]    • Cancer of overlapping sites of left female breast ([facility] [address] 9/4/1987    STAGE 3; CAF 6 CYCLES ; 12+ NODES; 30 XRT TX    • Depressive disorder, not elsewhere classified     ON ZOLOFT   • Disorder of bone and cartilage, unspecified 2005 • Other (Other) Mother         RA;TB;PE &DVT   • Cancer Maternal Grandmother 72        COLON   • Stroke Maternal Grandfather    • Other (Other) Maternal Grandfather         CVA   • Cancer Paternal Grandmother         LUNG    • Other (Other) Sister

## 2021-10-20 NOTE — ASSESSMENT & PLAN NOTE
Pt can't get the flu shot at the drugstore because of her HMO insurance. Advised patient to schedule an appointment here for this.

## 2021-10-28 ENCOUNTER — NURSE ONLY (OUTPATIENT)
Dept: FAMILY MEDICINE CLINIC | Facility: CLINIC | Age: 66
End: 2021-10-28

## 2021-10-28 DIAGNOSIS — Z23 NEED FOR VACCINATION: Primary | ICD-10-CM

## 2021-10-28 PROCEDURE — 90471 IMMUNIZATION ADMIN: CPT | Performed by: INTERNAL MEDICINE

## 2021-10-28 PROCEDURE — 90686 IIV4 VACC NO PRSV 0.5 ML IM: CPT | Performed by: INTERNAL MEDICINE

## 2021-10-29 ENCOUNTER — LAB ENCOUNTER (OUTPATIENT)
Dept: LAB | Age: 66
End: 2021-10-29
Attending: INTERNAL MEDICINE
Payer: COMMERCIAL

## 2021-10-29 DIAGNOSIS — E55.9 VITAMIN D DEFICIENCY: ICD-10-CM

## 2021-10-29 DIAGNOSIS — Z00.00 ROUTINE GENERAL MEDICAL EXAMINATION AT A HEALTH CARE FACILITY: ICD-10-CM

## 2021-10-29 PROCEDURE — 84443 ASSAY THYROID STIM HORMONE: CPT

## 2021-10-29 PROCEDURE — 82306 VITAMIN D 25 HYDROXY: CPT

## 2021-10-29 PROCEDURE — 80053 COMPREHEN METABOLIC PANEL: CPT

## 2021-10-29 PROCEDURE — 80061 LIPID PANEL: CPT

## 2021-10-29 PROCEDURE — 85025 COMPLETE CBC W/AUTO DIFF WBC: CPT

## 2021-10-29 PROCEDURE — 36415 COLL VENOUS BLD VENIPUNCTURE: CPT

## 2021-11-01 ENCOUNTER — HOSPITAL ENCOUNTER (OUTPATIENT)
Dept: MAMMOGRAPHY | Facility: HOSPITAL | Age: 66
Discharge: HOME | End: 2021-11-01
Attending: INTERNAL MEDICINE
Payer: COMMERCIAL

## 2021-11-01 DIAGNOSIS — C50.812 MALIGNANT NEOPLASM OF OVERLAPPING SITES OF LEFT BREAST IN FEMALE, ESTROGEN RECEPTOR POSITIVE (HCC): ICD-10-CM

## 2021-11-01 DIAGNOSIS — Z17.0 MALIGNANT NEOPLASM OF OVERLAPPING SITES OF LEFT BREAST IN FEMALE, ESTROGEN RECEPTOR POSITIVE (HCC): ICD-10-CM

## 2021-11-01 DIAGNOSIS — Z12.39 SCREENING FOR MALIGNANT NEOPLASM OF BREAST: ICD-10-CM

## 2021-11-01 PROCEDURE — 77067 SCR MAMMO BI INCL CAD: CPT | Performed by: INTERNAL MEDICINE

## 2021-11-01 PROCEDURE — 77063 BREAST TOMOSYNTHESIS BI: CPT | Performed by: INTERNAL MEDICINE

## 2021-11-05 ENCOUNTER — OFFICE VISIT (OUTPATIENT)
Dept: HEMATOLOGY/ONCOLOGY | Facility: HOSPITAL | Age: 66
End: 2021-11-05
Attending: INTERNAL MEDICINE
Payer: COMMERCIAL

## 2021-11-05 VITALS
SYSTOLIC BLOOD PRESSURE: 109 MMHG | HEART RATE: 64 BPM | RESPIRATION RATE: 16 BRPM | HEIGHT: 67 IN | WEIGHT: 124 LBS | TEMPERATURE: 98 F | OXYGEN SATURATION: 97 % | DIASTOLIC BLOOD PRESSURE: 46 MMHG | BODY MASS INDEX: 19.46 KG/M2

## 2021-11-05 DIAGNOSIS — Z86.39 HISTORY OF VITAMIN D DEFICIENCY: ICD-10-CM

## 2021-11-05 DIAGNOSIS — Z80.3 FAMILY HISTORY OF BREAST CANCER IN FIRST DEGREE RELATIVE: ICD-10-CM

## 2021-11-05 DIAGNOSIS — F32.A MILD DEPRESSION: ICD-10-CM

## 2021-11-05 DIAGNOSIS — M85.89 OSTEOPENIA OF MULTIPLE SITES: ICD-10-CM

## 2021-11-05 DIAGNOSIS — Z17.0 MALIGNANT NEOPLASM OF OVERLAPPING SITES OF LEFT BREAST IN FEMALE, ESTROGEN RECEPTOR POSITIVE (HCC): Primary | ICD-10-CM

## 2021-11-05 DIAGNOSIS — C50.812 MALIGNANT NEOPLASM OF OVERLAPPING SITES OF LEFT BREAST IN FEMALE, ESTROGEN RECEPTOR POSITIVE (HCC): Primary | ICD-10-CM

## 2021-11-05 DIAGNOSIS — R92.2 DENSE BREAST TISSUE ON MAMMOGRAM: ICD-10-CM

## 2021-11-05 PROBLEM — R92.30 DENSE BREAST TISSUE ON MAMMOGRAM: Status: ACTIVE | Noted: 2021-11-05

## 2021-11-05 PROCEDURE — 99214 OFFICE O/P EST MOD 30 MIN: CPT | Performed by: INTERNAL MEDICINE

## 2021-11-05 NOTE — PROGRESS NOTES
HPI   11/5/2021   Taryn Smith is a 61year old female has a history of pT3 N2 M0 left breast cancer diagnosed in 8/28/1987. Patient is not aware of local chest wall recurrence, SOB, or diffuse bone pain.   She has had some discomfort in her lateral 3/6/2021. Patient has had varicose vein procedures per Dr.Najjar. She remains concerned with her mother's dementia, and the health of her daughter and her grandsons. 2020 has given the family a marked increase in emotional stress.     Cancer of ove Negative for cough and shortness of breath. Cardiovascular: Negative for chest pain and palpitations. Gastrointestinal: Negative for abdominal pain, diarrhea, nausea and vomiting. Genitourinary: Positive for frequency and urgency.  Negative for dysur OSTEOPENIA; DEXA 4/4/08-NORMAL   • Displacement of lumbar intervertebral disc without myelopathy     L4-5   • Hypertonicity of bladder    • MALIGN NEOPL BREAST NOS ([facility] [address] 11/27/2007   • Malignant neoplasm of breast (female), unspecified site 9/4/87    STAGE Vaping Use      Vaping Use: Never used    Substance and Sexual Activity      Alcohol use:  Yes        Alcohol/week: 0.0 standard drinks        Comment: ONE PER MTH      Drug use: No      Sexual activity: Not on file    Other Topics      Concerns:        Not rheumatoid arthritis   • Dementia Mother         lewey body dementia   • Other (Other) Mother         RA;TB;PE &DVT   • Cancer Maternal Grandmother 72        COLON   • Stroke Maternal Grandfather    • Other (Other) Maternal Grandfather         CVA   • Canc abdominal tenderness. There is no right CVA tenderness, left CVA tenderness or guarding. Musculoskeletal:         General: Normal range of motion. Cervical back: Normal range of motion and neck supple.    Lymphadenopathy:      Comments: No palpable c is continuing the therapy for depression. She does have ongoing stress in her life with her family. 30 total minutes spent with patient >50% of visit was spent in counseling and coordination of care.     Results   11/1/2021 HALIE OWENS 2D 3D screening ameena Immature Granulocyte %      %  0.3 0.0   Glucose      70 - 99 mg/dL  82 87   Sodium      136 - 145 mmol/L  141 142   Potassium      3.5 - 5.1 mmol/L  4.2 3.9   Chloride      98 - 112 mmol/L  105 106   Carbon Dioxide, Total      21.0 - 32.0 mmol/L  31.0 3 an independent workstation. Denisa Master ENHANCEMENT PATTERN:   Moderate, with 50-75% of glandular tissue demonstrating enhancement. FINDINGS:    LEFT BREAST:  Patient is status post left mastectomy. Implant reconstruction is noted.   Vitamin E joseph right HALIE T0MO screening  BREAST COMPOSITION:          CATEGORY c- Breast tissue is heterogeneously dense, which may obscure small masses.    FINDINGS:           Right breast:  There continues to be very dense residual tissue with a small cluster of stable 3.5 - 5.1 mmol/L 3.9 4.6   Chloride      98 - 112 mmol/L 106 106   Carbon Dioxide, Total      21.0 - 32.0 mmol/L 32.0 32.0   ANION GAP      0 - 18 mmol/L 4 2   BUN      7 - 18 mg/dL 17 19 (H)   CREATININE      0.55 - 1.02 mg/dL 0.81 0.86   BUN/CREAT Ratio Vitamin D level 25 hydroxy

## 2021-12-12 PROBLEM — M85.89 OSTEOPENIA OF MULTIPLE SITES: Status: ACTIVE | Noted: 2021-12-12

## 2021-12-28 ENCOUNTER — OFFICE VISIT (OUTPATIENT)
Dept: FAMILY MEDICINE CLINIC | Facility: CLINIC | Age: 66
End: 2021-12-28

## 2021-12-28 VITALS
SYSTOLIC BLOOD PRESSURE: 120 MMHG | RESPIRATION RATE: 14 BRPM | HEART RATE: 74 BPM | TEMPERATURE: 97 F | HEIGHT: 68 IN | OXYGEN SATURATION: 98 % | WEIGHT: 125 LBS | DIASTOLIC BLOOD PRESSURE: 68 MMHG | BODY MASS INDEX: 18.94 KG/M2

## 2021-12-28 DIAGNOSIS — J06.9 VIRAL URI: Primary | ICD-10-CM

## 2021-12-28 PROCEDURE — 99213 OFFICE O/P EST LOW 20 MIN: CPT | Performed by: NURSE PRACTITIONER

## 2021-12-28 PROCEDURE — 3008F BODY MASS INDEX DOCD: CPT | Performed by: NURSE PRACTITIONER

## 2021-12-28 PROCEDURE — 87880 STREP A ASSAY W/OPTIC: CPT | Performed by: NURSE PRACTITIONER

## 2021-12-28 PROCEDURE — 3074F SYST BP LT 130 MM HG: CPT | Performed by: NURSE PRACTITIONER

## 2021-12-28 PROCEDURE — 3078F DIAST BP <80 MM HG: CPT | Performed by: NURSE PRACTITIONER

## 2021-12-28 NOTE — PROGRESS NOTES
CHIEF COMPLAINT:   Patient presents with:  Cold: had strep exposure. requests strep and covid testing. HPI:   Estephania Lyles is a 61year old female presents to clinic with complaints of nasal congestion. Patient has had symptoms for 2 days.    Rep Dub Tonio is developing normally  She is in the very early stage of puberty, which is normal for her  There are no concerns at this time for her pubertal development  Follow up in 1 year for well visit or sooner if problems  Mom states understanding and agrees with plan    Well Child Visit at 7 to 8 Years   AMBULATORY CARE:   A well child visit  is when your child sees a healthcare provider to prevent health problems  Well child visits are used to track your child's growth and development  It is also a time for you to ask questions and to get information on how to keep your child safe  Write down your questions so you remember to ask them  Your child should have regular well child visits from birth to 16 years  Development milestones your child may reach at 7 to 8 years:  Each child develops at his or her own pace  Your child might have already reached the following milestones, or he or she may reach them later:  · Lose baby teeth and grow in adult teeth    · Develop friendships and a best friend    · Help with tasks such as setting the table    · Tell time on a face clock     · Know days and months    · Ride a bicycle or play sports    · Start reading on his or her own and solving math problems    Help your child get the right nutrition:       · Teach your child about a healthy meal plan by setting a good example  Buy healthy foods for your family  Eat healthy meals together as a family as often as possible  Talk with your child about why it is important to choose healthy foods  · Provide a variety of fruits and vegetables  Half of your child's plate should contain fruits and vegetables  He or she should eat about 5 servings of fruits and vegetables each day  Buy fresh, canned, or dried fruit instead of fruit juice as often as possible  Offer more dark green, red, and orange vegetables  Dark green vegetables include broccoli, spinach, alaina lettuce, and ban greens   Examples of orange and red 9/4/87    STAGE 3; CAF 6 CYCLES ; 12+ NODES; 30 XRT TX   • OSTEOPENIA 7/2010 borderline osteopenia (stopped Fosamax at this time)   • Osteopenia of multiple sites 11/5/2016   • Osteopenia of neck of left femur 11/5/2016   • Osteoporosis    • OTHER DISEASES vegetables are carrots, sweet potatoes, winter squash, and red peppers  · Make sure your child has a healthy breakfast every day  Breakfast can help your child learn and focus better in school  · Limit foods that contain sugar and are low in healthy nutrients  Limit candy, soda, fast food, and salty snacks  Do not give your child fruit drinks  Limit 100% juice to 4 to 6 ounces each day  · Teach your child how to make healthy food choices  A healthy lunch may include a sandwich with lean meat, cheese, or peanut butter  It could also include a fruit, vegetable, and milk  Pack healthy foods if your child takes his or her own lunch to school  Pack baby carrots or pretzels instead of potato chips in your child's lunch box  You can also add fruit or low-fat yogurt instead of cookies  Keep your child's lunch cold with an ice pack so that it does not spoil  · Make sure your child gets enough calcium  Calcium is needed to build strong bones and teeth  Children need about 2 to 3 servings of dairy each day to get enough calcium  Good sources of calcium are low-fat dairy foods (milk, cheese, and yogurt)  A serving of dairy is 8 ounces of milk or yogurt, or 1½ ounces of cheese  Other foods that contain calcium include tofu, kale, spinach, broccoli, almonds, and calcium-fortified orange juice  Ask your child's healthcare provider for more information about the serving sizes of these foods  · Provide whole-grain foods  Half of the grains your child eats each day should be whole grains  Whole grains include brown rice, whole-wheat pasta, and whole-grain cereals and breads  · Provide lean meats, poultry, fish, and other healthy protein foods  Other healthy protein foods include legumes (such as beans), soy foods (such as tofu), and peanut butter  Bake, broil, and grill meat instead of frying it to reduce the amount of fat  · Use healthy fats to prepare your child's food    A healthy fat is unsaturated fat  It is found in foods such as soybean, canola, olive, and sunflower oils  It is also found in soft tub margarine that is made with liquid vegetable oil  Limit unhealthy fats such as saturated fat, trans fat, and cholesterol  These are found in shortening, butter, stick margarine, and animal fat  · Let your child decide how much to eat  Give your child small portions  Let your child have another serving if he or she asks for one  Your child will be very hungry on some days and want to eat more  For example, your child may want to eat more on days when he or she is more active  Your child may also eat more if he or she is going through a growth spurt  There may be days when your child eats less than usual      Help your  for his or her teeth:   · Remind your child to brush his or her teeth 2 times each day  Also, have your child floss once every day  Mouth care prevents infection, plaque, bleeding gums, mouth sores, and cavities  It also freshens breath and improves appetite  Brush, floss, and use mouthwash  Ask your child's dentist which mouthwash is best for you to use  · Take your child to the dentist at least 2 times each year  A dentist can check for problems with his or her teeth or gums, and provide treatments to protect his or her teeth  · Encourage your child to wear a mouth guard during sports  This will protect his or her teeth from injury  Make sure the mouth guard fits correctly  Ask your child's healthcare provider for more information on mouth guards  Keep your child safe:   · Have your child ride in a booster seat  and make sure everyone in your car wears a seatbelt  ? Children aged 9 to 8 years should ride in a booster car seat in the back seat  ? Booster seats come with and without a seat back  Your child will be secured in the booster seat with the regular seatbelt in your car     ?  Your child must stay in the booster car seat until he or she is between 8 and 12 for this or any previous visit (from the past 24 hour(s)). ASSESSMENT AND PLAN:   Assessment:   Viral uri  (primary encounter diagnosis)      Plan:   Discussed likely viral etiology. Quarantine guidelines reviewed. Monitor for worsening symptoms.   Wi years old and 4 foot 9 inches (57 inches) tall  This is when a regular seatbelt should fit your child properly without the booster seat  ? Your child should remain in a forward-facing car seat if you only have a lap belt seatbelt in your car  Some forward-facing car seats hold children who weigh more than 40 pounds  The harness on the forward-facing car seat will keep your child safer and more secure than a lap belt and booster seat  · Encourage your child to use safety equipment  Encourage him or her to wear helmets, protective sports gear, and life jackets  · Teach your child how to swim  Even if your child knows how to swim, do not let him or her play around water alone  An adult needs to be present and watching at all times  Make sure your child wears a safety vest when on a boat  · Put sunscreen on your child before he or she goes outside to play or swim  Use sunscreen with a SPF 15 or higher  Use as directed  Apply sunscreen at least 15 minutes before going outside  Reapply sunscreen every 2 hours when outside  · Remind your child how to cross the street safely  Remind your child to stop at the curb, look left, then look right, and left again  Tell your child to never cross the street without a grownup  Teach your child where the school bus will  and let off  Always have adult supervision at your child's bus stop  · Store and lock all guns and weapons  Make sure all guns are unloaded before you store them  Make sure your child cannot reach or find where weapons are kept  Never  leave a loaded gun unattended  · Remind your child about emergency safety  Be sure your child knows what to do in case of a fire or other emergency  Teach your child how to call 911  · Talk to your child about personal safety without making him or her anxious  Teach your child that no one has the right to touch his or her private parts   Also explain that no one should ask your child to touch their private parts  Let your child know that he or she should tell you even if he or she is told not to  Support your child:   · Encourage your child to get 1 hour of physical activity each day  Examples of physical activities include sports, running, walking, swimming, and riding bikes  The hour of physical activity does not need to be done all at once  It can be done in shorter blocks of time  · Limit your child's screen time  Screen time is the amount of television, computer, smart phone, and video game time your child has each day  It is important to limit screen time  This helps your child get enough sleep, physical activity, and social interaction each day  Your child's pediatrician can help you create a screen time plan  The daily limit is usually 1 hour for children 2 to 5 years  The daily limit is usually 2 hours for children 6 years or older  You can also set limits on the kinds of devices your child can use, and where he or she can use them  Keep the plan where your child and anyone who takes care of him or her can see it  Create a plan for each child in your family  You can also go to [employer]/English/media/Pages/default  aspx#planview for more help creating a plan  · Encourage your child to talk about school every day  Talk to your child about the good and bad things that may have happened during the school day  Encourage your child to tell you or a teacher if someone is being mean to him or her  Talk to your child's teacher about help or tutoring if your child is not doing well in school  · Help your child feel confident and secure  Give your child hugs and encouragement  Do activities together  Help him or her do tasks independently  Praise your child when he or she does tasks and activities well  Do not hit, shake, or spank your child  Set boundaries and reasonable consequences when rules are broken   Teach your child about acceptable behaviors  What you need to know about your child's next well child visit:  Your child's healthcare provider will tell you when to bring him or her in again  The next well child visit is usually at 9 to 10 years  Contact your child's healthcare provider if you have questions or concerns about your child's health or care before the next visit  Your child may need vaccines at the next well child visit  Your provider will tell you which vaccines your child needs and when your child should get them  © Copyright [employer] 2021 Information is for End User's use only and may not be sold, redistributed or otherwise used for commercial purposes  All illustrations and images included in CareNotes® are the copyrighted property of A D A M , Inc  or [facility] Radha Reid   The above information is an  only  It is not intended as medical advice for individual conditions or treatments  Talk to your doctor, nurse or pharmacist before following any medical regimen to see if it is safe and effective for you

## 2022-01-11 DIAGNOSIS — F32.A MILD DEPRESSION: ICD-10-CM

## 2022-01-11 NOTE — TELEPHONE ENCOUNTER
Refill passed per [facility] protocol. Requested Prescriptions   Pending Prescriptions Disp Refills    SERTRALINE 50 MG Oral Tab [Pharmacy Med Name: Sertraline Hcl 50 Mg Tab Nort] 90 tablet 0     Sig: Take 1 tablet (50 mg total) by mouth daily.         Psychiatric Non-Scheduled (Anti-Anxiety) Passed - 1/11/2022  1:44 PM        Passed - Appointment in last 6 or next 3 months              @[facility] [email or web address]      @[email or web address]@

## 2022-01-12 ENCOUNTER — HOSPITAL ENCOUNTER (OUTPATIENT)
Dept: BONE DENSITY | Age: 67
Discharge: HOME | End: 2022-01-12
Attending: INTERNAL MEDICINE
Payer: COMMERCIAL

## 2022-01-12 DIAGNOSIS — C50.812 MALIGNANT NEOPLASM OF OVERLAPPING SITES OF LEFT BREAST IN FEMALE, ESTROGEN RECEPTOR POSITIVE (HCC): ICD-10-CM

## 2022-01-12 DIAGNOSIS — Z17.0 MALIGNANT NEOPLASM OF OVERLAPPING SITES OF LEFT BREAST IN FEMALE, ESTROGEN RECEPTOR POSITIVE (HCC): ICD-10-CM

## 2022-01-12 DIAGNOSIS — M85.89 OSTEOPENIA OF MULTIPLE SITES: ICD-10-CM

## 2022-01-12 DIAGNOSIS — R92.2 DENSE BREAST TISSUE ON MAMMOGRAM: ICD-10-CM

## 2022-01-12 DIAGNOSIS — Z86.39 HISTORY OF VITAMIN D DEFICIENCY: ICD-10-CM

## 2022-01-12 DIAGNOSIS — Z80.3 FAMILY HISTORY OF BREAST CANCER IN FIRST DEGREE RELATIVE: ICD-10-CM

## 2022-01-12 PROCEDURE — 77080 DXA BONE DENSITY AXIAL: CPT | Performed by: INTERNAL MEDICINE

## 2022-01-31 ENCOUNTER — OFFICE VISIT (OUTPATIENT)
Dept: INTERNAL MEDICINE CLINIC | Facility: CLINIC | Age: 67
End: 2022-01-31
Payer: COMMERCIAL

## 2022-01-31 VITALS
HEART RATE: 77 BPM | RESPIRATION RATE: 16 BRPM | HEIGHT: 68 IN | WEIGHT: 123.13 LBS | SYSTOLIC BLOOD PRESSURE: 92 MMHG | BODY MASS INDEX: 18.66 KG/M2 | DIASTOLIC BLOOD PRESSURE: 53 MMHG

## 2022-01-31 DIAGNOSIS — Z23 NEED FOR VACCINATION: ICD-10-CM

## 2022-01-31 DIAGNOSIS — F32.A MILD DEPRESSION: ICD-10-CM

## 2022-01-31 DIAGNOSIS — M79.605 LEFT LEG PAIN: ICD-10-CM

## 2022-01-31 DIAGNOSIS — Z00.00 ROUTINE HEALTH MAINTENANCE: Primary | ICD-10-CM

## 2022-01-31 PROBLEM — R92.30 DENSE BREAST TISSUE ON MAMMOGRAM: Status: RESOLVED | Noted: 2021-11-05 | Resolved: 2022-01-31

## 2022-01-31 PROBLEM — R92.2 DENSE BREAST TISSUE ON MAMMOGRAM: Status: RESOLVED | Noted: 2021-11-05 | Resolved: 2022-01-31

## 2022-01-31 PROCEDURE — 99213 OFFICE O/P EST LOW 20 MIN: CPT | Performed by: INTERNAL MEDICINE

## 2022-01-31 PROCEDURE — 99396 PREV VISIT EST AGE 40-64: CPT | Performed by: INTERNAL MEDICINE

## 2022-01-31 PROCEDURE — 90715 TDAP VACCINE 7 YRS/> IM: CPT | Performed by: INTERNAL MEDICINE

## 2022-01-31 PROCEDURE — 3008F BODY MASS INDEX DOCD: CPT | Performed by: INTERNAL MEDICINE

## 2022-01-31 PROCEDURE — 90471 IMMUNIZATION ADMIN: CPT | Performed by: INTERNAL MEDICINE

## 2022-01-31 PROCEDURE — 3078F DIAST BP <80 MM HG: CPT | Performed by: INTERNAL MEDICINE

## 2022-01-31 PROCEDURE — 3074F SYST BP LT 130 MM HG: CPT | Performed by: INTERNAL MEDICINE

## 2022-01-31 NOTE — PROGRESS NOTES
HPI:    Patient ID: Nahun Lazar is a 61year old female. HPI:  Pt is here for a physical.  She can only sleep on her right side because she has pain on the left side. It only hurts at night.   Pt was on Fosamax in the past.      Past Surgical Histor OSTEOPENIA 7/2010 borderline osteopenia (stopped Fosamax at this time)   • Osteopenia of multiple sites 11/5/2016   • Osteopenia of neck of left femur 11/5/2016   • Osteoporosis    • OTHER DISEASES DEXA 2008 (normal)   • Unspecified vitamin D deficiency 4/ for chills, diaphoresis and fever. HENT: Negative for congestion, ear pain, nosebleeds, sore throat and trouble swallowing. Eyes: Negative for pain and redness. Respiratory: Negative for cough, shortness of breath and wheezing.     Cardiovascular: Ne nipple discharge or tenderness. Left: No inverted nipple, mass, nipple discharge or tenderness. Abdominal:      General: Bowel sounds are normal.      Palpations: Abdomen is soft. There is no hepatomegaly. Tenderness:  There is no abdominal Visit:  Requested Prescriptions      No prescriptions requested or ordered in this encounter

## 2022-01-31 NOTE — ASSESSMENT & PLAN NOTE
Unremarkable exam.  Pt's colon cancer screening is up to date. Immunizations were reviewed. Tdap. Counseled pt regarding diet and exercise. Reviewed labs with pt.

## 2022-03-23 ENCOUNTER — TELEPHONE (OUTPATIENT)
Dept: PHYSICAL THERAPY | Facility: HOSPITAL | Age: 67
End: 2022-03-23

## 2022-04-04 ENCOUNTER — OFFICE VISIT (OUTPATIENT)
Dept: PHYSICAL THERAPY | Age: 67
End: 2022-04-04
Attending: INTERNAL MEDICINE
Payer: COMMERCIAL

## 2022-04-04 DIAGNOSIS — M79.605 LEFT LEG PAIN: ICD-10-CM

## 2022-04-04 PROCEDURE — 97110 THERAPEUTIC EXERCISES: CPT | Performed by: PHYSICAL THERAPIST

## 2022-04-04 PROCEDURE — 97161 PT EVAL LOW COMPLEX 20 MIN: CPT | Performed by: PHYSICAL THERAPIST

## 2022-04-06 ENCOUNTER — OFFICE VISIT (OUTPATIENT)
Dept: PHYSICAL THERAPY | Age: 67
End: 2022-04-06
Attending: INTERNAL MEDICINE
Payer: COMMERCIAL

## 2022-04-06 DIAGNOSIS — M79.605 LEFT LEG PAIN: ICD-10-CM

## 2022-04-06 PROCEDURE — 97035 APP MDLTY 1+ULTRASOUND EA 15: CPT | Performed by: PHYSICAL THERAPIST

## 2022-04-06 PROCEDURE — 97140 MANUAL THERAPY 1/> REGIONS: CPT | Performed by: PHYSICAL THERAPIST

## 2022-04-06 PROCEDURE — 97014 ELECTRIC STIMULATION THERAPY: CPT | Performed by: PHYSICAL THERAPIST

## 2022-04-06 NOTE — PROGRESS NOTES
Dx: L leg pain/hip pain          Insurance (Authorized # of Visits):  2           Authorizing Physician: Dr. Rogelio Barron MD visit: none scheduled  Fall Risk: standard         Precautions: n/a             Subjective: Patient reports the hip still with pain at night. Was able to do the exercises at home without problems. Pain: 0/10      Objective: See outpatient daily record. Assessment: Use of modalities, STM this session, patient will continue strengthening at home. Goals:   Goals: (to be met in 8-12 visits)  1. Patient to be able to sleep on L side at night without pain waking her. 2. Patient to have increase in glut med strength L by 1/2 to 1 mm grade for improved stability at hip region  3. Patient independent with ongoing HEP. Plan: Assess modalities help with pain at L hip over next 2-3 sessions. Date: 4/6/2022  TX#: 2/   Foam roller side of L thigh/hip area  STM/IASTM L greater trochanter posterior- noted area of restriction. ultrasound x 8 min posterior greater trochanter at 1.4 w/cm2 cont  IFC and heat x 12 min at 1.4 w/cm2 cont.               HEP: Continue, no changes    Charges: ultrasound, mm IFC        Total Timed Treatment: 30 min  Total Treatment Time: 45 min

## 2022-04-11 ENCOUNTER — OFFICE VISIT (OUTPATIENT)
Dept: PHYSICAL THERAPY | Age: 67
End: 2022-04-11
Attending: INTERNAL MEDICINE
Payer: COMMERCIAL

## 2022-04-11 DIAGNOSIS — M79.605 LEFT LEG PAIN: ICD-10-CM

## 2022-04-11 PROCEDURE — 97140 MANUAL THERAPY 1/> REGIONS: CPT | Performed by: PHYSICAL THERAPIST

## 2022-04-11 PROCEDURE — 97035 APP MDLTY 1+ULTRASOUND EA 15: CPT | Performed by: PHYSICAL THERAPIST

## 2022-04-11 PROCEDURE — 97110 THERAPEUTIC EXERCISES: CPT | Performed by: PHYSICAL THERAPIST

## 2022-04-11 NOTE — PROGRESS NOTES
Dx: L leg pain/hip pain          Insurance (Authorized # of Visits):  3           Authorizing Physician: Dr. Kian Tristan  Next MD visit: none scheduled  Fall Risk: standard         Precautions: n/a             Subjective: Patient reports the hip did feel a little better after the work on the hip. States continued the exercises at home for strengthening. Pain: 0/10      Objective: See outpatient daily record. Assessment: Use of modalities, STM this session, patient will continue strengthening at home. Goals:   Goals: (to be met in 8-12 visits)  1. Patient to be able to sleep on L side at night without pain waking her. 2. Patient to have increase in glut med strength L by 1/2 to 1 mm grade for improved stability at hip region  3. Patient independent with ongoing HEP. Plan: Continue use of ultrasound STM, stretches,  Assess help with back extension on hip pain at night. Date: 4/11/2022  TX#: 3   a     ultrasound x 8 min posterior greater trochanter at 1.4 w/cm2 cont  Foam roller side of L thigh/hip area  STM/IASTM L greater trochanter posterior- noted area of restriction.   Prone press ups x 10  Standing back extension over fulcrum x 10  Stretches step - HS/Add/RF/ITB- 40 sec each         HEP: added press ups prone or standing back extensions    Charges: ultrasound, MM, Ex 1      Total Timed Treatment: 45 min  Total Treatment Time: 45 min

## 2022-04-13 ENCOUNTER — OFFICE VISIT (OUTPATIENT)
Dept: PHYSICAL THERAPY | Age: 67
End: 2022-04-13
Attending: INTERNAL MEDICINE
Payer: COMMERCIAL

## 2022-04-13 DIAGNOSIS — M79.605 LEFT LEG PAIN: ICD-10-CM

## 2022-04-13 PROCEDURE — 97140 MANUAL THERAPY 1/> REGIONS: CPT | Performed by: PHYSICAL THERAPIST

## 2022-04-13 PROCEDURE — 97110 THERAPEUTIC EXERCISES: CPT | Performed by: PHYSICAL THERAPIST

## 2022-04-13 NOTE — PROGRESS NOTES
Dx: L leg pain/hip pain          Insurance (Authorized # of Visits):  3           Authorizing Physician: Dr. Kathleen Hernandez  Next MD visit: none scheduled  Fall Risk: standard         Precautions: n/a             Subjective: Patient reports the hip did feel a little better after the work on the hip. States some soreness at hip yesterday, maybe too much exercise. Today feels better. States some light back pain yesterday, today not much, doesn't really feel it now. Pain: 0/10      Objective: See outpatient daily record. Assessment: Patient without report of changes with use of modalities. Will stop use at this time. Added additional stabilization exercises for home program.        Goals:   Goals: (to be met in 8-12 visits)  1. Patient to be able to sleep on L side at night without pain waking her. 2. Patient to have increase in glut med strength L by 1/2 to 1 mm grade for improved stability at hip region  3. Patient independent with ongoing HEP. Plan: Will discontinue use of modalities as no apparent ongoing help.   Added further exercises for home program.  Date: 4/13/2022  TX#: 4     Foam roller side of L thigh/hip area  ITB stretch side lying x 3  Side glut med GTB x 15 3 sec hold  Prone - lumbar P/A glides, 5 press ups  4 point hip ext 2# x 10 each 2 sec hold  4 point hydrant 2# x 10 each 2 sec hold   hip abd with black theraband  with bridge x 15  Hip add with ball x 15 with bridge  Standing back extension over fulcrum x 10  Hip hikes at step x 10 each  Side steps theraband x 10 each  Frwd/back diagonals RTB x 10 each  HS stretch at step           HEP: added hip add/abd with bridges, BHAVIN active stretch RF    Charges: Ex 2, MM      Total Timed Treatment: 45 min  Total Treatment Time: 45 min

## 2022-04-18 ENCOUNTER — OFFICE VISIT (OUTPATIENT)
Dept: PHYSICAL THERAPY | Age: 67
End: 2022-04-18
Attending: INTERNAL MEDICINE
Payer: COMMERCIAL

## 2022-04-18 DIAGNOSIS — M79.605 LEFT LEG PAIN: ICD-10-CM

## 2022-04-18 PROCEDURE — 97140 MANUAL THERAPY 1/> REGIONS: CPT | Performed by: PHYSICAL THERAPIST

## 2022-04-18 PROCEDURE — 97110 THERAPEUTIC EXERCISES: CPT | Performed by: PHYSICAL THERAPIST

## 2022-04-18 NOTE — PROGRESS NOTES
Dx: L leg pain/hip pain          Insurance (Authorized # of Visits):  11           Authorizing Physician: Dr. Consuelo Dao  Next MD visit: none scheduled  Fall Risk: standard         Precautions: n/a             Subjective: Patient reports she one bad night with the leg after visiting with her mom as she sits more in bad chairs. States has continued with the exercises. Pain: 0/10      Objective: See outpatient daily record. Assessment: Patient still with pain at night, able to perform all exercises without complaints this session. Will continue to work on flexibility, strengthening/stabilization. Goals:   Goals: (to be met in 8-12 visits)  1. Patient to be able to sleep on L side at night without pain waking her. 2. Patient to have increase in glut med strength L by 1/2 to 1 mm grade for improved stability at hip region  3. Patient independent with ongoing HEP. Plan: Continue toward goals, continue flexibility, strengthening/stabilization exercises.     Date: 4/18/2022  TX#: 5     Foam roller side of L thigh/hip area  ITB stretch side lying x 3  Side glut med GTB x 15 3 sec hold  Prone - lumbar P/A glides, 5 press ups  4 point hip ext 3# x 12 each 2 sec hold  4 point hydrant 2# x 10 each 2 sec hold   hip abd with pro [place] with bridge x 10  Hip add with ball x 15 with bridge x 10  Standing back extension over fulcrum x 10  Hip hikes at step x 12 each  Side steps theraband x 10 each  Frwd/back diagonals RTB x 10 each  HS stretch at step  Shuttle leg press double 7B x 20           HEP:     Charges: Ex 2, MM      Total Timed Treatment: 45 min  Total Treatment Time: 45 min

## 2022-04-20 ENCOUNTER — OFFICE VISIT (OUTPATIENT)
Dept: PHYSICAL THERAPY | Age: 67
End: 2022-04-20
Attending: INTERNAL MEDICINE
Payer: COMMERCIAL

## 2022-04-20 DIAGNOSIS — M79.605 LEFT LEG PAIN: ICD-10-CM

## 2022-04-20 PROCEDURE — 97110 THERAPEUTIC EXERCISES: CPT | Performed by: PHYSICAL THERAPIST

## 2022-04-20 PROCEDURE — 97140 MANUAL THERAPY 1/> REGIONS: CPT | Performed by: PHYSICAL THERAPIST

## 2022-04-20 NOTE — PROGRESS NOTES
Dx: L leg pain/hip pain          Insurance (Authorized # of Visits):  10           Authorizing Physician: Dr. Willy Medeiros  Next MD visit: none scheduled  Fall Risk: standard         Precautions: n/a             Subjective: Patient reports she has been good since the last session. Some twinges but otherwise good. Pain: 0/10      Objective: See outpatient daily record. Assessment: Patient doing better and less pain now at night, slight twinges but not constant like before. Patient continues with exercises at home. Able to complete all exercises in department without complaints. Is working on improvement of posture during work day and at night when relaxing. Goals:   Goals: (to be met in 8-12 visits)  1. Patient to be able to sleep on L side at night without pain waking her. 2. Patient to have increase in glut med strength L by 1/2 to 1 mm grade for improved stability at hip region  3. Patient independent with ongoing HEP. Plan: Continue toward goals, continue flexibility, strengthening/stabilization exercises.     Date: 4/20/2022  TX#: 6     Foam roller side of L thigh/hip area  ITB stretch side lying x 3  Side glut med GTB x 15 3 sec hold  Prone - lumbar P/A glides, 5 press ups  4 point hip ext 3# x 12 each 2 sec hold  4 point hydrant 2# x 10 each 2 sec hold   hip abd with pro [place] with bridge x 10  Hip add with ball x 15 with bridge x 10  Standing back extension over fulcrum x 10  Hip hikes at step x 12 each  Side steps theraband x 10 each  Frwd/back diagonals RTB x 10 each  HS stretch at step  Shuttle leg press double 8B x 20           HEP:     Charges: Ex 2, MM      Total Timed Treatment: 45 min  Total Treatment Time: 45 min

## 2022-04-25 ENCOUNTER — OFFICE VISIT (OUTPATIENT)
Dept: PHYSICAL THERAPY | Age: 67
End: 2022-04-25
Attending: INTERNAL MEDICINE
Payer: COMMERCIAL

## 2022-04-25 DIAGNOSIS — M79.605 LEFT LEG PAIN: ICD-10-CM

## 2022-04-25 PROCEDURE — 97110 THERAPEUTIC EXERCISES: CPT | Performed by: PHYSICAL THERAPIST

## 2022-04-25 PROCEDURE — 97140 MANUAL THERAPY 1/> REGIONS: CPT | Performed by: PHYSICAL THERAPIST

## 2022-04-25 NOTE — PROGRESS NOTES
Dx: L leg pain/hip pain          Insurance (Authorized # of Visits):  9           Authorizing Physician: Dr. Ca Barron MD visit: none scheduled  Fall Risk: standard         Precautions: n/a             Subjective: Patient reports she has been good since the last session. Only one episode at night and it didn't wake her but she felt it when she was awake. Pain: 0/10      Objective: See outpatient daily record. Assessment: Patient continues with decrease in symptoms noted. Only 1 episode the past week at night sleeping and the pain didn't wake her but she felt the pain when she woke up. No complaints of pain during session. Goals:   Goals: (to be met in 8-12 visits)  1. Patient to be able to sleep on L side at night without pain waking her. 2. Patient to have increase in glut med strength L by 1/2 to 1 mm grade for improved stability at hip region  3. Patient independent with ongoing HEP. Plan: Reassess next session. Plan discharge to independent HEP if no change in symptoms. Date: 4/25/2022  TX#: 7     Foam roller side of L thigh/hip area  ITB stretch side lying x 3  Side glut med GTB x 15 3 sec hold  Prone - lumbar P/A glides, 5 press ups  4 point hip ext 3# x 12 each 2 sec hold  4 point hydrant 3# x 10 each 2 sec hold   hip abd with pro [place] with bridge x 12  Hip add with ball x 15 with bridge x 12  Standing back extension over fulcrum x 10  Hip hikes at step x 12 each  Side steps theraband x 10 each  Frwd/back diagonals RTB x 10 each  HS stretch at step  Shuttle leg press double 8B x 20           HEP:   No changes.     Charges: Ex 2, MM      Total Timed Treatment: 45 min  Total Treatment Time: 45 min

## 2022-04-27 ENCOUNTER — OFFICE VISIT (OUTPATIENT)
Dept: PHYSICAL THERAPY | Age: 67
End: 2022-04-27
Attending: INTERNAL MEDICINE
Payer: COMMERCIAL

## 2022-04-27 DIAGNOSIS — M79.605 LEFT LEG PAIN: ICD-10-CM

## 2022-04-27 PROCEDURE — 97140 MANUAL THERAPY 1/> REGIONS: CPT | Performed by: PHYSICAL THERAPIST

## 2022-04-27 PROCEDURE — 97110 THERAPEUTIC EXERCISES: CPT | Performed by: PHYSICAL THERAPIST

## 2022-04-27 NOTE — PROGRESS NOTES
Dx: L leg pain/hip pain          Insurance (Authorized # of Visits):  6           Authorizing Physician: Dr. Geovany Moise  Next MD visit: none scheduled  Fall Risk: standard         Precautions: n/a             Subjective: Patient reports she has been good since the last session. Only one episode at night and it didn't wake her but she felt it when she was awake. States overall feels 75% better and will continue with the HEP which helps. States this morning did have some L side pain but gone once moving around. Pain: 0/10      Objective: See outpatient daily record. MMT:  Hip abd 4 minus/5    Initially 3+/5            Hip ext 4+/5,    Initially 4/5       Assessment: Patient has made improvement with strength and 75% improvement with symptoms on L side at night when sleeping. She has an ongoing HEP to continue to work on strength and Flexibility which she is independent with and compliant. There continues to be no pain during the day . Goals:   Goals: (to be met in 8-12 visits)  1. Patient to be able to sleep on L side at night without pain waking her.- Can sleep 75% better on L side   2. Patient to have increase in glut med strength L by 1/2 to 1 mm grade for improved stability at hip region - improved by 1/2 mm grade. 3. Patient independent with ongoing HEP.  - Met. Plan:   Plan discharge to independent HEP if no change in symptoms.     Date: 4/27/2022  TX#: 8     Foam roller side of L thigh/hip area  ITB stretch side lying x 3  Side glut med GTB 2 x 15  Prone - lumbar P/A glides, 5 press ups  4 point hip ext 3# x 12 each 2 sec hold  4 point hydrant 3# x 10 each 2 sec hold   hip abd with pro [place] with bridge x 12  Hip add with ball x 15 with bridge x 12  Standing back extension over fulcrum x 10  Hip hikes at step x 15 each  Side steps theraband x 10 each  Frwd/back diagonals RTB x 10 each  HS stretch at step  Shuttle leg press double 8B x 20  Shuttle leg press single x 15 each 5B           HEP:   No changes.     Charges: Ex 2, MM      Total Timed Treatment: 45 min  Total Treatment Time: 45 min

## 2022-07-18 DIAGNOSIS — F32.A MILD DEPRESSION: ICD-10-CM

## 2022-07-18 NOTE — TELEPHONE ENCOUNTER
Refill passed per [facility], [facility] protocol. Requested Prescriptions   Pending Prescriptions Disp Refills    SERTRALINE 50 MG Oral Tab [Pharmacy Med Name: Sertraline Hcl 50 Mg Tab Nort] 90 tablet 0     Sig: Take 1 tablet (50 mg total) by mouth daily. Psychiatric Non-Scheduled (Anti-Anxiety) Passed - 7/18/2022 12:32 PM        Passed - In person appointment or virtual visit in the past 6 mos or appointment in next 3 mos       Recent Outpatient Visits              2 months ago Left leg pain    [facility] in [place], [place]    Office Visit    2 months ago Left leg pain    [facility] in [place], [place]    Office Visit    2 months ago Left leg pain    [facility] in [place], [place]    Office Visit    3 months ago Left leg pain    [facility] in [place], [place]    Office Visit    3 months ago Left leg pain    [facility] in [place], [place]    Office Visit     Future Appointments         Provider Department Appt Notes    In 3 months Natasha Hernandez [facility]. Will have labs prior to visit                    Recent Outpatient Visits              2 months ago Left leg pain    [facility] in [place], [place]    Office Visit    2 months ago Left leg pain    [facility] in [place], [place]    Office Visit    2 months ago Left leg pain    [facility] in [place], [place]    Office Visit    3 months ago Left leg pain    [facility] in [place], [place]    Office Visit    3 months ago Left leg pain    [facility] in [place], [place]    Office Visit          Future Appointments         Provider Department Appt Notes    In 3 months Natasha Hernandez [facility].   Will have labs prior to visit

## 2022-09-08 ENCOUNTER — TELEPHONE (OUTPATIENT)
Dept: HEMATOLOGY/ONCOLOGY | Facility: HOSPITAL | Age: 67
End: 2022-09-08

## 2022-09-08 DIAGNOSIS — Z17.0 MALIGNANT NEOPLASM OF OVERLAPPING SITES OF LEFT BREAST IN FEMALE, ESTROGEN RECEPTOR POSITIVE (HCC): Primary | ICD-10-CM

## 2022-09-08 DIAGNOSIS — C50.812 MALIGNANT NEOPLASM OF OVERLAPPING SITES OF LEFT BREAST IN FEMALE, ESTROGEN RECEPTOR POSITIVE (HCC): Primary | ICD-10-CM

## 2022-09-08 DIAGNOSIS — Z12.31 ENCOUNTER FOR SCREENING MAMMOGRAM FOR BREAST CANCER: ICD-10-CM

## 2022-09-08 NOTE — TELEPHONE ENCOUNTER
Patient calling. Has a F/U sched. 10/17. She is due to have mammogram  In November. Asking if it would be better to have mammogram first- then F/U.     Patient stated she will need the order for mammogram    Please advise

## 2022-09-08 NOTE — TELEPHONE ENCOUNTER
Patient called and notified new order in system for Mammogram and appointment rescheduled to 11/16/22.

## 2022-09-20 ENCOUNTER — NURSE TRIAGE (OUTPATIENT)
Dept: FAMILY MEDICINE CLINIC | Facility: CLINIC | Age: 67
End: 2022-09-20

## 2022-09-20 ENCOUNTER — OFFICE VISIT (OUTPATIENT)
Dept: INTERNAL MEDICINE CLINIC | Facility: CLINIC | Age: 67
End: 2022-09-20

## 2022-09-20 VITALS
BODY MASS INDEX: 18.34 KG/M2 | SYSTOLIC BLOOD PRESSURE: 93 MMHG | RESPIRATION RATE: 17 BRPM | DIASTOLIC BLOOD PRESSURE: 61 MMHG | WEIGHT: 121 LBS | HEIGHT: 68 IN | HEART RATE: 84 BPM

## 2022-09-20 DIAGNOSIS — R39.9 UTI SYMPTOMS: Primary | ICD-10-CM

## 2022-09-20 LAB
APPEARANCE: CLEAR
BILIRUBIN: NEGATIVE
GLUCOSE (URINE DIPSTICK): NEGATIVE MG/DL
KETONES (URINE DIPSTICK): NEGATIVE MG/DL
MULTISTIX LOT#: ABNORMAL NUMERIC
NITRITE, URINE: NEGATIVE
PH, URINE: 5.5 (ref 4.5–8)
PROTEIN (URINE DIPSTICK): 30 MG/DL
SPECIFIC GRAVITY: 1.01 (ref 1–1.03)
URINE-COLOR: YELLOW
UROBILINOGEN,SEMI-QN: 0.2 MG/DL (ref 0–1.9)

## 2022-09-20 PROCEDURE — 3008F BODY MASS INDEX DOCD: CPT | Performed by: NURSE PRACTITIONER

## 2022-09-20 PROCEDURE — 99214 OFFICE O/P EST MOD 30 MIN: CPT | Performed by: NURSE PRACTITIONER

## 2022-09-20 PROCEDURE — 81003 URINALYSIS AUTO W/O SCOPE: CPT | Performed by: NURSE PRACTITIONER

## 2022-09-20 PROCEDURE — 3078F DIAST BP <80 MM HG: CPT | Performed by: NURSE PRACTITIONER

## 2022-09-20 PROCEDURE — 3074F SYST BP LT 130 MM HG: CPT | Performed by: NURSE PRACTITIONER

## 2022-09-20 RX ORDER — NITROFURANTOIN 25; 75 MG/1; MG/1
100 CAPSULE ORAL 2 TIMES DAILY
Qty: 14 CAPSULE | Refills: 0 | Status: SHIPPED | OUTPATIENT
Start: 2022-09-20

## 2022-10-17 DIAGNOSIS — F32.A MILD DEPRESSION: ICD-10-CM

## 2022-11-02 ENCOUNTER — HOSPITAL ENCOUNTER (OUTPATIENT)
Dept: MAMMOGRAPHY | Age: 67
Discharge: HOME | End: 2022-11-02
Attending: NURSE PRACTITIONER
Payer: MEDICARE

## 2022-11-02 DIAGNOSIS — Z12.31 ENCOUNTER FOR SCREENING MAMMOGRAM FOR BREAST CANCER: ICD-10-CM

## 2022-11-02 DIAGNOSIS — C50.812 MALIGNANT NEOPLASM OF OVERLAPPING SITES OF LEFT BREAST IN FEMALE, ESTROGEN RECEPTOR POSITIVE (HCC): ICD-10-CM

## 2022-11-02 DIAGNOSIS — Z17.0 MALIGNANT NEOPLASM OF OVERLAPPING SITES OF LEFT BREAST IN FEMALE, ESTROGEN RECEPTOR POSITIVE (HCC): ICD-10-CM

## 2022-11-02 PROCEDURE — 77063 BREAST TOMOSYNTHESIS BI: CPT | Performed by: NURSE PRACTITIONER

## 2022-11-02 PROCEDURE — 77067 SCR MAMMO BI INCL CAD: CPT | Performed by: NURSE PRACTITIONER

## 2022-11-16 ENCOUNTER — OFFICE VISIT (OUTPATIENT)
Dept: HEMATOLOGY/ONCOLOGY | Facility: HOSPITAL | Age: 67
End: 2022-11-16
Attending: INTERNAL MEDICINE
Payer: MEDICARE

## 2022-11-16 VITALS
RESPIRATION RATE: 16 BRPM | BODY MASS INDEX: 19.21 KG/M2 | SYSTOLIC BLOOD PRESSURE: 113 MMHG | DIASTOLIC BLOOD PRESSURE: 70 MMHG | WEIGHT: 123.81 LBS | OXYGEN SATURATION: 99 % | HEIGHT: 67.5 IN | HEART RATE: 78 BPM | TEMPERATURE: 98 F

## 2022-11-16 DIAGNOSIS — Z90.12 H/O MASTECTOMY, LEFT: ICD-10-CM

## 2022-11-16 DIAGNOSIS — Z85.3 ENCOUNTER FOR FOLLOW-UP SURVEILLANCE OF BREAST CANCER: ICD-10-CM

## 2022-11-16 DIAGNOSIS — Z08 ENCOUNTER FOR FOLLOW-UP SURVEILLANCE OF BREAST CANCER: ICD-10-CM

## 2022-11-16 DIAGNOSIS — Z85.3 HISTORY OF LEFT BREAST CANCER: Primary | ICD-10-CM

## 2022-11-16 DIAGNOSIS — Z12.31 SCREENING MAMMOGRAM, ENCOUNTER FOR: ICD-10-CM

## 2022-11-16 PROCEDURE — 99213 OFFICE O/P EST LOW 20 MIN: CPT | Performed by: INTERNAL MEDICINE

## 2023-01-20 DIAGNOSIS — F32.A MILD DEPRESSION: ICD-10-CM

## 2023-01-20 NOTE — TELEPHONE ENCOUNTER
Refill passed per [place] [employer], [facility] protocol. Requested Prescriptions   Pending Prescriptions Disp Refills    sertraline 50 MG Oral Tab [Pharmacy Med Name: Sertraline Hcl 50 Mg Tab Nort] 90 tablet 1     Sig: Take 1 tablet (50 mg total) by mouth daily.        Psychiatric Non-Scheduled (Anti-Anxiety) Passed - 1/20/2023  9:13 AM        Passed - In person appointment or virtual visit in the past 6 mos or appointment in next 3 mos     Recent Outpatient Visits              2 months ago History of left breast cancer    [facility] Hematology Oncology Roberto Chowdhury MD    Office Visit    4 months ago UTI symptoms    [facility] [address], [place] Kandace Navarrete, APREZ    Office Visit    8 months ago Left leg pain    [facility] in [place], [place]    Office Visit    9 months ago Left leg pain    [facility] in [place], [place]    Office Visit    9 months ago Left leg pain    [facility] in [place], [place]    Office Visit          Future Appointments         Provider Department Appt Notes    In 1 week Sheri Medina MD [address] [place],[address], [address] [address], [facility] [address] transfer of care - establish care    In 3 weeks [facility] RN RADIOLOGY 1; [address]                     Recent Outpatient Visits              2 months ago History of left breast cancer    [facility] Hematology Oncology Roberto Chowdhury MD    Office Visit    4 months ago UTI symptoms    [facility] [address], [place] APR[place]    Office Visit    8 months ago Left leg pain    [facility] in [place], [place]    Office Visit    9 months ago Left leg pain    [facility] in [place], [place]    Office Visit    9 months ago Left leg pain    [facility] in [place], [place]    Office Visit            Future Appointments         Provider Department Appt Notes    In 1 week Isabelle Castañeda MD [address], [address], [place] transfer of care - establish care    In 3 weeks [facility] RN RADIOLOGY 1; [address]

## 2023-02-08 NOTE — LETTER
[facility]  Administration of Anesthesia  Eda SOTO agree to be cared for by a physician anesthesiologist alone and/or with a nurse anesthetist, who is specially trained to monitor me and give me medicine to put me to sleep or keep me comfortable during my procedure    I understand that my anesthesiologist and/or anesthetist is not an employee or agent of [facility] or [employer]. He or she works for [facility]    As the patient asking for anesthesia services, I agree to:  Allow the anesthesiologist (anesthesia doctor) to give me medicine and do additional procedures as necessary. Some examples are: Starting or using an “IV” to give me medicine, fluids or blood during my procedure, and having a breathing tube placed to help me breathe when I’m asleep (intubation). In the event that my heart stops working properly, I understand that my anesthesiologist will make every effort to sustain my life, unless otherwise directed by [facility] Do Not Resuscitate documents.  Tell my anesthesia doctor before my procedure:  If I am pregnant.  The last time that I ate or drank.  iii. All of the medicines I take (including prescriptions, herbal supplements, and pills I can buy without a prescription (including street drugs/illegal medications). Failure to inform my anesthesiologist about these medicines may increase my risk of anesthetic complications.  iv.If I am allergic to anything or have had a reaction to anesthesia before.  I understand how the anesthesia medicine will help me (benefits).  I understand that with any type of anesthesia medicine there are risks:  The most common risks are: nausea, vomiting, sore throat, muscle soreness, damage to my eyes, mouth, or teeth (from breathing tube placement).  Rare risks include: remembering what happened during my procedure, allergic reactions to medications, injury to my airway, heart, lungs, vision, nerves, or  ED Outreach Care Transitions Initial Telephone Call    Today's Date: 2/8/2023      Discharge Date: 2/7    Discharged From: [place] [facility]     Items for attention: hypokalemia     Pt returned call. States he isn't feeling the best. Denies dizziness. Abdominal pain has increased. Rates it an 8/10. Pt states the pain is why he went into the ED yesterday. States it went away during ED visit after he was given magnesium and fluids. Pt believes he is feeling this way from the potassium supplement he was given. Due to hypokalemia in ED, the provider told him to increase his dose. Pt does not wan to do this due to the pain he is feeling. Pt concerned and not sure what to do about abdominal pain. Staff message sent to PCP's office for advice. Pt lives at home with his wife. Wife drives to [employer]. Pt has been using a walker around the house for the last few days.     Care Transitions Assessment    Utilization:  Visit Hospital Last 30 Days: ED visit  Perception of Change in Health Status D/C: Improving  Discharged To: Home independently  Discharge Instructions: Received discharge instructions; Understands d/c instructions  Transition of Care Information Provided:    Phone Call to Facility to Check Status:      Medications:  Prescriptions: pt was told to increase potassium however, he is not taking it  IP/Amb Med List Reviewed with Patient: Yes  Med Prescriptions Filled After D/C: Already has supply; Confirms taking as prescribed  Questions About Meds Prescribed at D/C: Denies questions    Follow-up Care:  Appointments: needs PCP visit - staff message sent  Provider Appt Scheduled Prior to D/C:    Provider Appt Date:    Provider Appt to be Scheduled by:    Type of Provider:       Appt Scheduled Prior to D/C: Yes  Specialist Appt Date: 02/13/23  Type of Specialist: adonay    Follow-up Appt Status: Confirms scheduled appt    Skilled Services: No  Skilled Services Options:      Equipment/DME:  Does Patient  Currently Have DME:    DME Type: Walker  Equipment/DME Intervention Needed: Confident  Oxygen In Use:    Oxygen Comments:      Review of Systems:  Care Transition System Evaluation:    General Symptoms: WDL (absence of symptoms)    Fever: is not present    Temperature:      Pain: 8  Pain Location: stomach pain  Respiratory Symptoms: WDL (absence of symptoms)  Shortness of Breath:    Cardiovascular Symptoms: WDL (absence of symptoms)  Hours of Uninterrupted Sleep:    Sleeping Behaviors: WDL (absence of symptoms)  GI Symptoms: Cramping (8/10 abd pain)  Abdominal Tenderness:    Bowel Ostomy Type:     Symptoms: WDL (absence of symptoms)  Urinary Elimination:    Feeding Tube Type:    Skin Symptoms: WDL (absence of symptoms)  Wound Type:      Activities of Daily Living (global): Self-care    Patient states that he does have sufficient family support. He feels that he is able to ask for assistance when needed.    Provided/reviewed my direct contact information and encouraged the to call me if any needs arise.  Reviewed to call their MD directly to discuss any new/worsening symptoms.  Verbalized understanding to the plan of care.       muscles and in extremely rare instances death.  My doctor has explained to me other choices available to me for my care (alternatives).  Pregnant Patients (“epidural”):  I understand that the risks of having an epidural (medicine given into my back to help control pain during labor), include itching, low blood pressure, difficulty urinating, headache or slowing of the baby’s heart. Very rare risks include infection, bleeding, seizure, irregular heart rhythms and nerve injury.  Regional Anesthesia (“spinal”, “epidural”, & “nerve blocks”):  I understand that rare but potential complications include headache, bleeding, infection, seizure, irregular heart rhythms, and nerve injury.    _____________________________________________________________________________  Patient (or Representative) Signature/Relationship to Patient  Date   Time    _____________________________________________________________________________   Name (if used)    Language/Organization   Time    _____________________________________________________________________________  Nurse Anesthetist Signature     Date   Time  _____________________________________________________________________________  Anesthesiologist Signature     Date   Time  I have discussed the procedure and information above with the patient (or patient’s representative) and answered their questions. The patient or their representative has agreed to have anesthesia services.    _____________________________________________________________________________  Witness        Date   Time  I have verified that the signature is that of the patient or patient’s representative, and that it was signed before the procedure  Patient Name: Eda Theodore     : 10/6/1955                 Printed: 2024 at 7:05 AM    Medical Record #: [id number]                                            Page 1 of 1  ----------ANESTHESIA CONSENT----------

## 2023-02-14 ENCOUNTER — EKG ENCOUNTER (OUTPATIENT)
Dept: LAB | Age: 68
End: 2023-02-14
Attending: INTERNAL MEDICINE
Payer: MEDICARE

## 2023-02-14 ENCOUNTER — OFFICE VISIT (OUTPATIENT)
Dept: INTERNAL MEDICINE CLINIC | Facility: CLINIC | Age: 68
End: 2023-02-14

## 2023-02-14 VITALS
DIASTOLIC BLOOD PRESSURE: 64 MMHG | SYSTOLIC BLOOD PRESSURE: 111 MMHG | WEIGHT: 121 LBS | BODY MASS INDEX: 18.77 KG/M2 | HEART RATE: 77 BPM | HEIGHT: 67.5 IN

## 2023-02-14 DIAGNOSIS — I49.9 IRREGULAR HEARTBEAT: ICD-10-CM

## 2023-02-14 DIAGNOSIS — I83.891 VARICOSE VEINS OF RIGHT LOWER EXTREMITY WITH COMPLICATIONS: ICD-10-CM

## 2023-02-14 DIAGNOSIS — E53.8 B12 DEFICIENCY: ICD-10-CM

## 2023-02-14 DIAGNOSIS — Z85.3 HISTORY OF LEFT BREAST CANCER: ICD-10-CM

## 2023-02-14 DIAGNOSIS — Z00.00 MEDICARE ANNUAL WELLNESS VISIT, INITIAL: ICD-10-CM

## 2023-02-14 DIAGNOSIS — F32.A MILD DEPRESSION: ICD-10-CM

## 2023-02-14 DIAGNOSIS — E78.00 PURE HYPERCHOLESTEROLEMIA: Primary | ICD-10-CM

## 2023-02-14 DIAGNOSIS — H91.90 HEARING LOSS, UNSPECIFIED HEARING LOSS TYPE, UNSPECIFIED LATERALITY: ICD-10-CM

## 2023-02-14 DIAGNOSIS — M85.89 OSTEOPENIA OF MULTIPLE SITES: ICD-10-CM

## 2023-02-14 DIAGNOSIS — H61.23 BILATERAL IMPACTED CERUMEN: ICD-10-CM

## 2023-02-14 DIAGNOSIS — E55.9 VITAMIN D DEFICIENCY: ICD-10-CM

## 2023-02-14 DIAGNOSIS — R22.42 MASS OF SKIN OF LEFT LOWER LEG: ICD-10-CM

## 2023-02-14 LAB
ATRIAL RATE: 63 BPM
P AXIS: 95 DEGREES
P-R INTERVAL: 168 MS
Q-T INTERVAL: 402 MS
QRS DURATION: 80 MS
QTC CALCULATION (BEZET): 411 MS
R AXIS: 79 DEGREES
T AXIS: 79 DEGREES
VENTRICULAR RATE: 63 BPM

## 2023-02-14 PROCEDURE — 93005 ELECTROCARDIOGRAM TRACING: CPT

## 2023-02-14 PROCEDURE — 93010 ELECTROCARDIOGRAM REPORT: CPT | Performed by: INTERNAL MEDICINE

## 2023-02-14 PROCEDURE — G0402 INITIAL PREVENTIVE EXAM: HCPCS | Performed by: INTERNAL MEDICINE

## 2023-02-14 PROCEDURE — 90677 PCV20 VACCINE IM: CPT | Performed by: INTERNAL MEDICINE

## 2023-02-14 PROCEDURE — G0009 ADMIN PNEUMOCOCCAL VACCINE: HCPCS | Performed by: INTERNAL MEDICINE

## 2023-02-14 PROCEDURE — G0403 EKG FOR INITIAL PREVENT EXAM: HCPCS | Performed by: INTERNAL MEDICINE

## 2023-02-15 ENCOUNTER — LAB ENCOUNTER (OUTPATIENT)
Dept: LAB | Facility: REFERENCE LAB | Age: 68
End: 2023-02-15
Attending: INTERNAL MEDICINE
Payer: MEDICARE

## 2023-02-15 DIAGNOSIS — E53.8 B12 DEFICIENCY: ICD-10-CM

## 2023-02-15 DIAGNOSIS — E55.9 VITAMIN D DEFICIENCY: ICD-10-CM

## 2023-02-15 DIAGNOSIS — E78.00 PURE HYPERCHOLESTEROLEMIA: ICD-10-CM

## 2023-02-15 LAB
ALBUMIN SERPL-MCNC: 3.7 G/DL (ref 3.4–5)
ALBUMIN/GLOB SERPL: 1 {RATIO} (ref 1–2)
ALP LIVER SERPL-CCNC: 71 U/L
ALT SERPL-CCNC: 39 U/L
ANION GAP SERPL CALC-SCNC: 4 MMOL/L (ref 0–18)
AST SERPL-CCNC: 32 U/L (ref 15–37)
BASOPHILS # BLD AUTO: 0.03 X10(3) UL (ref 0–0.2)
BASOPHILS NFR BLD AUTO: 0.5 %
BILIRUB SERPL-MCNC: 0.5 MG/DL (ref 0.1–2)
BILIRUB UR QL: NEGATIVE
BUN BLD-MCNC: 21 MG/DL (ref 7–18)
BUN/CREAT SERPL: 22.8 (ref 10–20)
CALCIUM BLD-MCNC: 9.9 MG/DL (ref 8.5–10.1)
CHLORIDE SERPL-SCNC: 104 MMOL/L (ref 98–112)
CHOLEST SERPL-MCNC: 170 MG/DL (ref ?–200)
CO2 SERPL-SCNC: 33 MMOL/L (ref 21–32)
COLOR UR: YELLOW
CREAT BLD-MCNC: 0.92 MG/DL
DEPRECATED RDW RBC AUTO: 41.6 FL (ref 35.1–46.3)
EOSINOPHIL # BLD AUTO: 0.16 X10(3) UL (ref 0–0.7)
EOSINOPHIL NFR BLD AUTO: 2.8 %
ERYTHROCYTE [DISTWIDTH] IN BLOOD BY AUTOMATED COUNT: 11.9 % (ref 11–15)
FASTING PATIENT LIPID ANSWER: YES
FASTING STATUS PATIENT QL REPORTED: YES
GFR SERPLBLD BASED ON 1.73 SQ M-ARVRAT: 69 ML/MIN/1.73M2 (ref 60–?)
GLOBULIN PLAS-MCNC: 3.8 G/DL (ref 2.8–4.4)
GLUCOSE BLD-MCNC: 97 MG/DL (ref 70–99)
GLUCOSE UR-MCNC: NEGATIVE MG/DL
HCT VFR BLD AUTO: 44 %
HDLC SERPL-MCNC: 68 MG/DL (ref 40–59)
HGB BLD-MCNC: 14.3 G/DL
HGB UR QL STRIP.AUTO: NEGATIVE
IMM GRANULOCYTES # BLD AUTO: 0.01 X10(3) UL (ref 0–1)
IMM GRANULOCYTES NFR BLD: 0.2 %
KETONES UR-MCNC: NEGATIVE MG/DL
LDLC SERPL CALC-MCNC: 95 MG/DL (ref ?–100)
LYMPHOCYTES # BLD AUTO: 1.2 X10(3) UL (ref 1–4)
LYMPHOCYTES NFR BLD AUTO: 20.9 %
MCH RBC QN AUTO: 31.1 PG (ref 26–34)
MCHC RBC AUTO-ENTMCNC: 32.5 G/DL (ref 31–37)
MCV RBC AUTO: 95.7 FL
MONOCYTES # BLD AUTO: 0.3 X10(3) UL (ref 0.1–1)
MONOCYTES NFR BLD AUTO: 5.2 %
NEUTROPHILS # BLD AUTO: 4.04 X10 (3) UL (ref 1.5–7.7)
NEUTROPHILS # BLD AUTO: 4.04 X10(3) UL (ref 1.5–7.7)
NEUTROPHILS NFR BLD AUTO: 70.4 %
NITRITE UR QL STRIP.AUTO: NEGATIVE
NONHDLC SERPL-MCNC: 102 MG/DL (ref ?–130)
OSMOLALITY SERPL CALC.SUM OF ELEC: 295 MOSM/KG (ref 275–295)
PH UR: 7 [PH] (ref 5–8)
PLATELET # BLD AUTO: 226 10(3)UL (ref 150–450)
POTASSIUM SERPL-SCNC: 5 MMOL/L (ref 3.5–5.1)
PROT SERPL-MCNC: 7.5 G/DL (ref 6.4–8.2)
PROT UR-MCNC: 30 MG/DL
RBC # BLD AUTO: 4.6 X10(6)UL
SODIUM SERPL-SCNC: 141 MMOL/L (ref 136–145)
SP GR UR STRIP: 1.03 (ref 1–1.03)
TRIGL SERPL-MCNC: 28 MG/DL (ref 30–149)
TSI SER-ACNC: 2.41 MIU/ML (ref 0.36–3.74)
UROBILINOGEN UR STRIP-ACNC: <2
VIT B12 SERPL-MCNC: 768 PG/ML (ref 193–986)
VIT C UR-MCNC: 40 MG/DL
VIT D+METAB SERPL-MCNC: 45.6 NG/ML (ref 30–100)
VLDLC SERPL CALC-MCNC: 5 MG/DL (ref 0–30)
WBC # BLD AUTO: 5.7 X10(3) UL (ref 4–11)

## 2023-02-15 PROCEDURE — 82607 VITAMIN B-12: CPT

## 2023-02-15 PROCEDURE — 87086 URINE CULTURE/COLONY COUNT: CPT

## 2023-02-15 PROCEDURE — 85025 COMPLETE CBC W/AUTO DIFF WBC: CPT

## 2023-02-15 PROCEDURE — 81001 URINALYSIS AUTO W/SCOPE: CPT

## 2023-02-15 PROCEDURE — 36415 COLL VENOUS BLD VENIPUNCTURE: CPT

## 2023-02-15 PROCEDURE — 82306 VITAMIN D 25 HYDROXY: CPT

## 2023-02-15 PROCEDURE — 80061 LIPID PANEL: CPT

## 2023-02-15 PROCEDURE — 80053 COMPREHEN METABOLIC PANEL: CPT

## 2023-02-15 PROCEDURE — 84443 ASSAY THYROID STIM HORMONE: CPT

## 2023-02-22 ENCOUNTER — PATIENT MESSAGE (OUTPATIENT)
Dept: INTERNAL MEDICINE CLINIC | Facility: CLINIC | Age: 68
End: 2023-02-22

## 2023-03-01 NOTE — TELEPHONE ENCOUNTER
All results are in normal  range and can fluctuate on daily  bases -depending of  hydration or previous meals . Zehra Taveras Keep with low sugar/ carb diet and drink 50 ounces of water per day . Otherwise  not significant changes .

## 2023-03-09 ENCOUNTER — TELEPHONE (OUTPATIENT)
Dept: HEMATOLOGY/ONCOLOGY | Facility: HOSPITAL | Age: 68
End: 2023-03-09

## 2023-03-11 ENCOUNTER — HOSPITAL ENCOUNTER (OUTPATIENT)
Dept: CT IMAGING | Age: 68
Discharge: HOME | End: 2023-03-11
Attending: INTERNAL MEDICINE

## 2023-03-11 DIAGNOSIS — Z13.6 SCREENING FOR HEART DISEASE: ICD-10-CM

## 2023-03-22 ENCOUNTER — TELEPHONE (OUTPATIENT)
Dept: HEMATOLOGY/ONCOLOGY | Facility: HOSPITAL | Age: 68
End: 2023-03-22

## 2023-03-23 ENCOUNTER — OFFICE VISIT (OUTPATIENT)
Dept: OTOLARYNGOLOGY | Facility: CLINIC | Age: 68
End: 2023-03-23

## 2023-03-23 VITALS — WEIGHT: 121 LBS | HEIGHT: 67.5 IN | TEMPERATURE: 97 F | BODY MASS INDEX: 18.77 KG/M2

## 2023-03-23 DIAGNOSIS — H61.23 BILATERAL IMPACTED CERUMEN: Primary | ICD-10-CM

## 2023-03-23 DIAGNOSIS — H93.12 TINNITUS OF LEFT EAR: ICD-10-CM

## 2023-03-23 PROCEDURE — 69209 REMOVE IMPACTED EAR WAX UNI: CPT | Performed by: SPECIALIST

## 2023-03-23 NOTE — PATIENT INSTRUCTIONS
Cerumen was fully cleaned from both your ears. Follow-up in 1 years time, sooner if problems. Decrease sodium and caffeine for the tinnitus.

## 2023-03-23 NOTE — PROGRESS NOTES
Ears = bilateral cerumen occlussions. Fully cleaned under microscope using instrumentation and suctioning. Normal tympanic membranes. Follow-up in 1 years time, sooner if problems. Consider decreasing sodium and caffeine for the tinnitus.

## 2023-08-28 ENCOUNTER — NURSE ONLY (OUTPATIENT)
Facility: CLINIC | Age: 68
End: 2023-08-28

## 2023-08-28 DIAGNOSIS — Z80.0 FAMILY HISTORY OF COLON CANCER: ICD-10-CM

## 2023-08-28 DIAGNOSIS — Z12.11 COLON CANCER SCREENING: Primary | ICD-10-CM

## 2023-08-30 ENCOUNTER — TELEPHONE (OUTPATIENT)
Facility: CLINIC | Age: 68
End: 2023-08-30

## 2023-09-18 ENCOUNTER — OFFICE VISIT (OUTPATIENT)
Dept: INTERNAL MEDICINE CLINIC | Facility: CLINIC | Age: 68
End: 2023-09-18

## 2023-09-18 VITALS
BODY MASS INDEX: 18.77 KG/M2 | DIASTOLIC BLOOD PRESSURE: 62 MMHG | HEART RATE: 71 BPM | SYSTOLIC BLOOD PRESSURE: 98 MMHG | WEIGHT: 121 LBS | HEIGHT: 67.5 IN

## 2023-09-18 DIAGNOSIS — N30.01 ACUTE CYSTITIS WITH HEMATURIA: Primary | ICD-10-CM

## 2023-09-18 LAB
APPEARANCE: CLEAR
GLUCOSE (URINE DIPSTICK): 250 MG/DL
KETONES (URINE DIPSTICK): 15 MG/DL
MULTISTIX LOT#: ABNORMAL NUMERIC
NITRITE, URINE: POSITIVE
PH, URINE: 5 (ref 4.5–8)
PROTEIN (URINE DIPSTICK): >=300 MG/DL
SPECIFIC GRAVITY: 1 (ref 1–1.03)
UROBILINOGEN,SEMI-QN: 8 MG/DL (ref 0–1.9)

## 2023-09-18 PROCEDURE — 81003 URINALYSIS AUTO W/O SCOPE: CPT | Performed by: NURSE PRACTITIONER

## 2023-09-18 PROCEDURE — 99213 OFFICE O/P EST LOW 20 MIN: CPT | Performed by: NURSE PRACTITIONER

## 2023-09-18 RX ORDER — NITROFURANTOIN 25; 75 MG/1; MG/1
100 CAPSULE ORAL 2 TIMES DAILY
Qty: 14 CAPSULE | Refills: 0 | Status: SHIPPED | OUTPATIENT
Start: 2023-09-18

## 2023-09-19 LAB
BILIRUB UR QL CFM: NEGATIVE
GLUCOSE UR-MCNC: 100 MG/DL
KETONES UR-MCNC: 15 MG/DL
NITRITE UR QL STRIP.AUTO: POSITIVE
PH UR: 5 [PH] (ref 5–8)
PROT UR-MCNC: >=300 MG/DL
RBC #/AREA URNS AUTO: >10 /HPF
RBC #/AREA URNS AUTO: >10 /HPF
SP GR UR STRIP: 1.01 (ref 1–1.03)
UROBILINOGEN UR STRIP-ACNC: 4
WBC #/AREA URNS AUTO: >50 /HPF
WBC #/AREA URNS AUTO: >50 /HPF

## 2023-11-06 ENCOUNTER — HOSPITAL ENCOUNTER (OUTPATIENT)
Dept: MAMMOGRAPHY | Facility: HOSPITAL | Age: 68
Discharge: HOME | End: 2023-11-06
Attending: INTERNAL MEDICINE
Payer: MEDICARE

## 2023-11-06 DIAGNOSIS — Z12.31 SCREENING MAMMOGRAM, ENCOUNTER FOR: ICD-10-CM

## 2023-11-06 DIAGNOSIS — Z90.12 H/O MASTECTOMY, LEFT: ICD-10-CM

## 2023-11-06 LAB — AMB EXT COVID-19 RESULT: DETECTED

## 2023-11-06 PROCEDURE — 77067 SCR MAMMO BI INCL CAD: CPT | Performed by: INTERNAL MEDICINE

## 2023-11-06 PROCEDURE — 77063 BREAST TOMOSYNTHESIS BI: CPT | Performed by: INTERNAL MEDICINE

## 2023-11-07 ENCOUNTER — NURSE TRIAGE (OUTPATIENT)
Dept: INTERNAL MEDICINE CLINIC | Facility: CLINIC | Age: 68
End: 2023-11-07

## 2023-11-07 NOTE — TELEPHONE ENCOUNTER
Action Requested: Summary for Provider     []  Critical Lab, Recommendations Needed  [] Need Additional Advice  []   FYI    []   Need Orders  [] Need Medications Sent to Pharmacy  []  Other     SUMMARY: Patient reports of sore throat that started Sunday and got worse the next day. She also started having congestion, body aches and fatigue. Patient states testing positive for Covid yesterday. She has mainly the same symptoms today but congestion has been manageable. Denies fever, chest pain, shortness of breath or difficulty of breathing. Covid isolation guidelines per CDC and supportive home care instructions given and discussed such as increasing water intake, rest, over-the-counter medication as needed, salt water gargle, warm tea with honey or lozenges to soothe the throat. Patient verbalized understanding and agreed with plan of care. Patient asked about Paxlovid. Explained that appointment is needed in order to be evaluated to determine if she is a candidate for the medication. She will monitor how she feels in the next few days and will call the office if symptoms worsen.       Reason for call: Covid  Onset: 11/6    Reason for Disposition   [1] COVID-19 diagnosed by positive lab test (e.g., PCR, rapid self-test kit) AND [2] mild symptoms (e.g., cough, fever, others) AND [1] no complications or SOB    Protocols used: Coronavirus (COVID-19) Diagnosed or [id number]

## 2023-11-16 ENCOUNTER — OFFICE VISIT (OUTPATIENT)
Dept: HEMATOLOGY/ONCOLOGY | Facility: HOSPITAL | Age: 68
End: 2023-11-16
Attending: INTERNAL MEDICINE
Payer: MEDICARE

## 2023-11-16 VITALS
OXYGEN SATURATION: 98 % | RESPIRATION RATE: 16 BRPM | DIASTOLIC BLOOD PRESSURE: 48 MMHG | SYSTOLIC BLOOD PRESSURE: 125 MMHG | HEART RATE: 66 BPM | WEIGHT: 123 LBS | BODY MASS INDEX: 19.08 KG/M2 | TEMPERATURE: 98 F | HEIGHT: 67.5 IN

## 2023-11-16 DIAGNOSIS — Z85.3 ENCOUNTER FOR FOLLOW-UP SURVEILLANCE OF BREAST CANCER: ICD-10-CM

## 2023-11-16 DIAGNOSIS — Z90.12 H/O MASTECTOMY, LEFT: ICD-10-CM

## 2023-11-16 DIAGNOSIS — Z08 ENCOUNTER FOR FOLLOW-UP SURVEILLANCE OF BREAST CANCER: ICD-10-CM

## 2023-11-16 DIAGNOSIS — Z12.31 SCREENING MAMMOGRAM, ENCOUNTER FOR: ICD-10-CM

## 2023-11-16 DIAGNOSIS — Z85.3 HISTORY OF LEFT BREAST CANCER: Primary | ICD-10-CM

## 2023-11-16 PROCEDURE — 99213 OFFICE O/P EST LOW 20 MIN: CPT | Performed by: INTERNAL MEDICINE

## 2023-12-27 ENCOUNTER — TELEPHONE (OUTPATIENT)
Facility: CLINIC | Age: 68
End: 2023-12-27

## 2024-01-11 ENCOUNTER — OFFICE VISIT (OUTPATIENT)
Dept: DERMATOLOGY CLINIC | Facility: CLINIC | Age: 69
End: 2024-01-11
Payer: MEDICARE

## 2024-01-11 DIAGNOSIS — I73.00 RAYNAUD'S PHENOMENON WITHOUT GANGRENE: ICD-10-CM

## 2024-01-11 DIAGNOSIS — D36.7: Primary | ICD-10-CM

## 2024-01-11 DIAGNOSIS — M79.674 PAIN IN TOES OF BOTH FEET: ICD-10-CM

## 2024-01-11 DIAGNOSIS — M79.675 PAIN IN TOES OF BOTH FEET: ICD-10-CM

## 2024-01-11 PROCEDURE — 99204 OFFICE O/P NEW MOD 45 MIN: CPT | Performed by: DERMATOLOGY

## 2024-01-13 NOTE — PROGRESS NOTES
Eda Theodore is a 68 year old female.  HPI:     CC:    Chief Complaint   Patient presents with    Full Skin Exam     New pt. Presents for full body skin exam. Father with BCC and SCC. No personal hx of skin CA. Please check cyst like lesion to left posterior lower leg. Had it for years. Painful when she pushes on it          Allergies:  Adhesive tape and Penicillins    HISTORY:    Past Medical History:   Diagnosis Date    Alopecia areata     HAS HAD INJECTIONS OF STEROIDS INTO THE SCALP WITH DERM-DR NATHALIE DUTTON    Breast CA (HCC)     Cancer of overlapping sites of left female breast (HCC) 9/4/1987    STAGE 3; CAF 6 CYCLES ; 12+ NODES; 30 XRT TX     Dense breast tissue on mammogram 11/5/2021    Depressive disorder, not elsewhere classified     ON ZOLOFT    Disorder of bone and cartilage, unspecified 2005    OSTEOPENIA; DEXA 4/4/08-NORMAL    Displacement of lumbar intervertebral disc without myelopathy     L4-5    Hypertonicity of bladder     MALIGN NEOPL BREAST NOS (HCC) 11/27/2007    Malignant neoplasm of breast (female), unspecified site 9/4/87    STAGE 3; CAF 6 CYCLES ; 12+ NODES; 30 XRT TX    OSTEOPENIA 7/2010 borderline osteopenia (stopped Fosamax at this time)    Osteopenia of multiple sites 11/5/2016    Osteopenia of neck of left femur 11/5/2016    Osteoporosis     OTHER DISEASES DEXA 2008 (normal)    Unspecified vitamin D deficiency 4/16/2009    Vitamin D deficiency 4/16/2009      Past Surgical History:   Procedure Laterality Date    BREAST SURGERY PROCEDURE UNLISTED  1987    mastectomy    CHEMOTHERAPY  1987    COLONOSCOPY  3/2013    hemorrhoids. repeat 5 yrs, 2 day prep    COLONOSCOPY,DIAGNOSTIC  11/21/03    INTERNAL HEMORRHOIDS ; DUE FOR REPEAT IN 8-10 YRS WITH DR EPSTEIN    COLONOSCOPY,DIAGNOSTIC  3/1/2013    Procedure: COLONOSCOPY, POSSIBLE BIOPSY, POSSIBLE POLYPECTOMY 16545;  Surgeon: Nash Soler MD;  Location: [facility]    HYSTERECTOMY      and USO    MASTECTOMY LEFT   09/1987    OOPHORECTOMY      per NG: x1    RADIATION LEFT Left 1988    REDUCTION RIGHT  1988    SPECIAL SERVICE OR REPORT      B/L BUNIONECTOMIES    SPECIAL SERVICE OR REPORT  AGE 31    Left MRM FOR STAGE 3 BREAST CA    SPECIAL SERVICE OR REPORT  AGE 32    LEFT BREAST RECONSTRUCTION WITH SALINE IMPLANT    SPECIAL SERVICE OR REPORT  AGE 32    RIGHT BREAST REDUCTION    VAGINAL HYSTERECTOMY      WITH ONE SO FOR CYST      Family History   Problem Relation Age of Onset    Cancer Father         CLL; BCCA;SQCCA; PAROTID TUMOR; prostate ca, BCC, SCC    Heart Disorder Father         ARRHYTHMIA ON COUMADIN; AFIB    Ear Problems Father         per NG: hearing defeciency    Other (Other) Father     Hypertension Mother     Arthritis Mother         per NG: rheumatoid arthritis    Dementia Mother         lewey body dementia    Other (Other) Mother         RA;TB;PE &DVT    Breast Cancer Daughter 39        MyRisk negative (2016)    Cancer Maternal Grandmother 65        COLON    Stroke Maternal Grandfather     Other (Other) Maternal Grandfather         CVA    Cancer Paternal Grandmother         LUNG     Other (Other) Sister         OSTEOPENIA    Cancer Maternal Aunt 50        uterine ca    Cancer Maternal Aunt         thyroid/renal ca (non-smoker)    Breast Cancer Self 31      Social History     Socioeconomic History    Marital status:    Occupational History     Comment: works [employer]   Tobacco Use    Smoking status: Never    Smokeless tobacco: Never   Vaping Use    Vaping Use: Never used   Substance and Sexual Activity    Alcohol use: Yes     Alcohol/week: 0.0 standard drinks of alcohol     Comment: ONE PER MTH    Drug use: No   Other Topics Concern    Reaction to local anesthetic No    Pt has a pacemaker No    Pt has a defibrillator No   Social History Narrative     X 28YRS    NOT CURRENTLY DATING        Current Outpatient Medications   Medication Sig Dispense Refill    sertraline 50 MG Oral  Tab Take 1 tablet (50 mg total) by mouth daily. 90 tablet 3    NON FORMULARY 2 scoops of collagen powder a day      Vitamin D3 50 MCG (2000 UT) Oral Cap Take 50 mcg by mouth daily.      Calcium Carbonate-Vitamin D (CALCIUM 500 + D OR) Take by mouth.      Probiotic Product (ALIGN) Oral Cap Take  by mouth.      MULTI-VITAMIN/MINERALS OR TABS 1 TABLET DAILY      nitrofurantoin monohydrate macro 100 MG Oral Cap Take 1 capsule (100 mg total) by mouth 2 (two) times daily. 14 capsule 0    polyethylene glycol, PEG 3350-KCl-NaBcb-NaCl-NaSulf, 236 g Oral Recon Soln Take 4,000 mL by mouth As Directed. Take 2,000 mL the night before your procedure and 2,000 mL the morning of your procedure. 1 each 0     Allergies:   Allergies   Allergen Reactions    Adhesive Tape HIVES    Penicillins HIVES       Past Medical History:   Diagnosis Date    Alopecia areata     HAS HAD INJECTIONS OF STEROIDS INTO THE SCALP WITH DERM-DR NATHALIE DUTTON    Breast CA ([facility])     Cancer of overlapping sites of left female breast ([facility]) 9/4/1987    STAGE 3; CAF 6 CYCLES ; 12+ NODES; 30 XRT TX     Dense breast tissue on mammogram 11/5/2021    Depressive disorder, not elsewhere classified     ON ZOLOFT    Disorder of bone and cartilage, unspecified 2005    OSTEOPENIA; DEXA 4/4/08-NORMAL    Displacement of lumbar intervertebral disc without myelopathy     L4-5    Hypertonicity of bladder     MALIGN NEOPL BREAST NOS ([facility]) 11/27/2007    Malignant neoplasm of breast (female), unspecified site 9/4/87    STAGE 3; CAF 6 CYCLES ; 12+ NODES; 30 XRT TX    OSTEOPENIA 7/2010 borderline osteopenia (stopped Fosamax at this time)    Osteopenia of multiple sites 11/5/2016    Osteopenia of neck of left femur 11/5/2016    Osteoporosis     OTHER DISEASES DEXA 2008 (normal)    Unspecified vitamin D deficiency 4/16/2009    Vitamin D deficiency 4/16/2009     Past Surgical History:   Procedure Laterality Date    BREAST SURGERY PROCEDURE UNLISTED  1987    mastectomy    CHEMOTHERAPY   1987    COLONOSCOPY  3/2013    hemorrhoids. repeat 5 yrs, 2 day prep    COLONOSCOPY,DIAGNOSTIC  11/21/03    INTERNAL HEMORRHOIDS ; DUE FOR REPEAT IN 8-10 YRS WITH DR EPSTEIN    COLONOSCOPY,DIAGNOSTIC  3/1/2013    Procedure: COLONOSCOPY, POSSIBLE BIOPSY, POSSIBLE POLYPECTOMY 82960;  Surgeon: Nash Soler MD;  Location: [facility]    HYSTERECTOMY      and USO    MASTECTOMY LEFT  09/1987    OOPHORECTOMY      per NG: x1    RADIATION LEFT Left 1988    REDUCTION RIGHT  1988    SPECIAL SERVICE OR REPORT      B/L BUNIONECTOMIES    SPECIAL SERVICE OR REPORT  AGE 31    Left MRM FOR STAGE 3 BREAST CA    SPECIAL SERVICE OR REPORT  AGE 32    LEFT BREAST RECONSTRUCTION WITH SALINE IMPLANT    SPECIAL SERVICE OR REPORT  AGE 32    RIGHT BREAST REDUCTION    VAGINAL HYSTERECTOMY      WITH ONE SO FOR CYST     Social History     Socioeconomic History    Marital status:      Spouse name: Not on file    Number of children: Not on file    Years of education: Not on file    Highest education level: Not on file   Occupational History     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   Tobacco Use    Smoking status: Never    Smokeless tobacco: Never   Vaping Use    Vaping Use: Never used   Substance and Sexual Activity    Alcohol use: Yes     Alcohol/week: 0.0 standard drinks of alcohol     Comment: ONE PER MTH    Drug use: No    Sexual activity: Not on file   Other Topics Concern    Grew up on a farm Not Asked    History of tanning Not Asked    Outdoor occupation Not Asked    Breast feeding Not Asked    Reaction to local anesthetic No    Pt has a pacemaker No    Pt has a defibrillator No   Social History Narrative     X 28YRS    NOT CURRENTLY DATING     Social Determinants of Health     Financial Resource Strain: Not on file   Food Insecurity: Not on file   Transportation Needs: Not on file   Physical Activity: Not on file   Stress: Not on file   Social Connections: Not on file   Housing Stability: Not  on file     Family History   Problem Relation Age of Onset    Cancer Father         CLL; BCCA;SQCCA; PAROTID TUMOR; prostate ca, BCC, SCC    Heart Disorder Father         ARRHYTHMIA ON COUMADIN; AFIB    Ear Problems Father         per NG: hearing defeciency    Other (Other) Father     Hypertension Mother     Arthritis Mother         per NG: rheumatoid arthritis    Dementia Mother         lewey body dementia    Other (Other) Mother         RA;TB;PE &DVT    Breast Cancer Daughter 39        MyRisk negative (2016)    Cancer Maternal Grandmother 65        COLON    Stroke Maternal Grandfather     Other (Other) Maternal Grandfather         CVA    Cancer Paternal Grandmother         LUNG     Other (Other) Sister         OSTEOPENIA    Cancer Maternal Aunt 50        uterine ca    Cancer Maternal Aunt         thyroid/renal ca (non-smoker)    Breast Cancer Self 31       There were no vitals filed for this visit.    HPI:  Chief Complaint   Patient presents with    Full Skin Exam     New pt. Presents for full body skin exam. Father with BCC and SCC. No personal hx of skin CA. Please check cyst like lesion to left posterior lower leg. Had it for years. Painful when she pushes on it        Patient with family history skin cancer no personal history of skin cancer patient's father with excessive sun.  Patient with little sun exposure in the past.    Does note bluish discoloration on toes, purple has been discolored throughout the year for many years with cold accentuated reports some blanching.  As noted some changes in the hands now.  No evaluation for this in the past.  Notes tender lesion at left posterior leg enlarging.  Has been present for years is getting bigger now rubbing on socks and shoes.  Patient does have history of breast cancer 1987 post chemo and radiation    Patient presents with concerns above.    Patient has been in their usual state of health.     Past notes/ records and appropriate/relevant lab results  including pathology and past body maps reviewed. Including outside notes/ PCP notes as appropriate. Updated and new information noted in current visit.     ROS:  Denies other relevant systemic complaints.      History, medications, allergies reviewed as noted.       Physical Examination:     Well-developed well-nourished patient alert oriented in no acute distress.  Exam performed, including scalp, head, neck, face,nails, hair, external eyes, including conjunctival mucosa, eyelids, lips external ears , arms, digits,palms.     Multiple light to medium brown, well marginated, uniformly pigmented, macules and papules 6 mm and less scattered on exam. pigmented lesions examined with dermoscopy benign-appearing patterns.     Waxy tannish keratotic papules scattered, cherry-red vascular papules scattered.    See map today's date for lesions noted .  See assessment and plan below for specific lesions.    Otherwise remarkable for lesions as noted on map.    See A/P  below for additional information:    Assessment / plan:    Orders Placed This Encounter   Procedures    Lupus Anticoagulant/Antiphospholipid Panel    Anti-Nuclear Antibody (LAURA) by IFA, Reflex Titer + Specific Antibodies    Rheumatoid Arthritis Factor    Monoclonal Protein Study       Meds & Refills for this Visit:  Requested Prescriptions      No prescriptions requested or ordered in this encounter         Encounter Diagnoses   Name Primary?    Cyst, dermoid, leg, unspecified laterality Yes    Raynaud's phenomenon without gangrene     Pain in toes of both feet        Subcutaneous nodule deep skin mobile over this at Achilles tendon area left posterior leg.  1.5 cm.  Increasing in size.  Referred to Dr. Whitfield for evaluation and treatment possible excision.  Differential diagnosis includes ganglion or tendon cyst.  Does not appear to be an epidermal cyst.    Other benign keratoses lentigines nevi no suspicious lesions fair skin, family history of skin cancer  continue regular monitoring, follow-up.    Family history of skin cancer, patient with past history of chemo, radiation monitor for skin cancer consistently.  Self exams, continue sunscreen sun protection    Discoloration of toes bluish violaceous changes no nodularity, no obvious Raynaud's visible on the hands.  Patient notes color changes.  Likely pernio possible vascular changes from chemo no obvious varicosities.  Consider vascular testing.  Additional labs as noted.      No other susupicious lesions on todays  exam.      Please refer to map for specific lesions.  See additional diagnoses.  Pros cons of various therapies, risks benefits discussed.Pathophysiology discussed with patient.  Therapeutic options reviewed.  See  Medications in grid.  Instructions reviewed at length.    Benign nevi, seborrheic  keratoses, cherry angiomas:  Reassurance regarding other benign skin lesions.Signs and symptoms of skin cancer, ABCDE's of melanoma discussed with patient. Sunscreen use, sun protection, self exams reviewed.  Followup as noted RTC ---routine checkup    6 mos -one year or p.r.n.    Encounter Times   Including precharting, reviewing chart, prior notes obtaining history: 10 minutes, medical exam :10 minutes, notes on body map, plan, counseling 10minutes My total time spent caring for the patient on the day of the encounter: 30 minutes     The patient indicates understanding of these issues and agrees to the plan.  The patient is asked to return as noted in follow-up/ above.    This note was generated using Dragon voice recognition software.  Please contact me regarding any confusion resulting from errors in recognition..  Note to patient and family: The 21st Century Cures Act makes medical notes like these available to patients. However, be advised this is a medical document. It is intended as [id number] communication and monitoring of a patient's care needs. It is written in medical language and may contain  abbreviations or verbiage that are unfamiliar. It may appear blunt or direct. Medical documents are intended to carry relevant information, facts as evident and the clinical opinion of the practitioner.

## 2024-01-13 NOTE — PATIENT INSTRUCTIONS
Sun Protection  Wear protective clothing. This includes wearing a broad rimmed hat, long sleeves, high collars, and tightly woven clothes. Specific clothing lines are available for people who need more complete sun avoidance. Use a broad spectrum sunblock (SPF 30 or higher when above options are not possible, or for areas not covered by clothing.    -Avoid being outside when the ultraviolet B (burning) wavelengths from the sun are most intense (10 AM to 3 PM) during non-winter seasons. [place] it is year round.    -Ultraviolet A wavelengths from the sun are present year round from stephanie to dusk. They pass through window glass. This type of ultraviolet is the form seen in tanning beds. It will accentuate sunburns and will produce most of the sunlight -induced aging changes in our skin because it penetrates much deeper into the skin than do the ultraviolet B wavelengths. It is also associated with an increased chance of developing melanoma, potentially the most serious type of skin cancer.    -Broad spectrum sunblocks:  Mexoryl is a sunblock ingredient that is superior to other UVA protection agents. It is found in Planwise (available at [employer] or [employer]). Sunblocks containing microfine zinc oxide (Z-willie) or avobenzone (Parsol 1789) provide good UVA protection too.  Any sunscreen with the American Academy of Dermat[employer] Seal is a recommended sunscreen.     -Use SPF 30 or greater an apply liberally. It takes 2 tablespoons or one shot glass to cover the average man in a swim suit. The SPF number provides information about the relative amount of protection provided by a product. Because most people use sunblock sparingly, they may get only 1/2 to 1/3 the SPF coverage indicated on the label. SPF may be sufficient in theory, but in real life it is best to use a higher sunblock, hats, SPF clothing and swimwear, and shade.    -Application technique: Apply sunscreens and sunblocks 20-30 minutes before start off sun  exposure and reapply 20 minutes after exposure begins. Studies suggest this provides improved protection. Reapply sunscreens and sunblocks after 2 hours of use or after 80 minutes if swimming or sweating. Sunscreen effectiveness diminishes after 2 hours unless properly reapplied.     Checking for Skin Cancer  You can find cancer early by checking your skin each month. There are 3 common kinds of skin cancer. They are melanoma, basal cell carcinoma, and squamous cell carcinoma. Doing monthly skin checks is the best way to find new marks or skin changes. Follow the instructions below for checking your skin.  The ABCDEs of checking moles for melanoma  Check your moles or growths for signs of melanoma using ABCDE:  Asymmetry: the sides of the mole or growth don’t match  Border: the edges are ragged, notched, or blurred  Color: the color within the mole or growth varies  Diameter: the mole or growth is larger than 6 mm (size of a pencil eraser)  Evolving: the size, shape, or color of the mole or growth is changing    Checking for other types of skin cancer  Basal cell carcinoma or squamous cell carcinoma have symptoms such as:  A spot or mole that looks different from all other marks on your skin  Changes in how an area feels, such as itching, tenderness, or pain  Changes in the skin's surface, such as oozing, bleeding, or scaliness  A sore that does not heal  New swelling or redness beyond the border of a mole  Who’s at risk?  Anyone can get skin cancer. But you are at greater risk if you have:  Fair skin, light-colored hair, or light-colored eyes  Many moles or abnormal moles on your skin  A history of sunburns from sunlight or tanning beds  A family history of skin cancer  A history of exposure to radiation or chemicals  A weakened immune system  If you have had skin cancer in the past, you are at risk for recurring skin cancer.  How to check your skin  Do your monthly skin checkups in front of a full-length mirror.  Check all parts of your body, including your:  Head (ears, face, neck, and scalp)  Torso (front, back, and sides)  Arms (tops, undersides, upper, and lower armpits)  Hands (palms, backs, and fingers, including under the nails)  Buttocks and genitals  Legs (front, back, and sides)  Feet (tops, soles, toes, including under the nails, and between toes)  If you have a lot of moles, take digital photos of them each month. Make sure to take photos both up close and from a distance. These can help you see if any moles change over time.  Most skin changes are not cancer. But if you see any changes in your skin, call your doctor right away. Only he or she can diagnose a problem. If you have skin cancer, seeing your doctor can be the first step toward getting the treatment that could save your life.  imedo last reviewed this educational content on 4/1/2019 © 2000-2021 [employer]. All rights reserved. This information is not intended as a substitute for professional medical care. Always follow your healthcare professional's instructions.    Preventing Skin Cancer     Use sunscreen of SPF 30 or greater. Apply liberally.   Relaxing in the sun may feel good. But it isn’t good for your skin. In fact, the sun’s harmful rays are the major cause of skin cancer. This is a serious disease that can be life-threatening. People of all ages, races, and backgrounds are at risk.  Skin cancer is the most common cancer in the [place] But in most cases, it can be prevented.  Your role in prevention  You can act today to help prevent skin cancer. Start by avoiding the sun’s UV (ultraviolet) rays. And don’t use tanning beds or lamps. They are no safer than the sun. Taking these steps can help keep you from getting skin cancer. It can also help prevent wrinkles and other aging effects caused by the sun. Make sure your children also follow these safeguards. Now is the time to start taking steps to prevent skin cancer.  When you are  outdoors  Protect your skin when you go out during the day. Take safety steps whenever you go out to eat, run errands by car or on foot, or do any outdoor activity. There isn’t just one easy way to protect your skin. It’s best to follow all of these steps:  Wear tightly woven clothing that covers your skin. Put on a wide-brimmed hat to protect your face, ears, and scalp.  Watch the clock. Try to stay out of the sun between 10 a.m. and 4 p.m. That's when the sun's rays are strongest.  Head for the shade or create your own. Use an umbrella when sitting or strolling.  Know that the sun’s rays can reflect off sand, water, and snow. This can harm your skin. Take extra care when you are near reflective surfaces.  Keep in mind that even when the weather is hazy or cloudy, your skin can be exposed to strong UV rays.  Shield your skin with sunscreen. Also use sunscreen on your children’s skin. Keep babies younger than 6 months old out of the sun.  Tips for using sunscreen  To help prevent skin cancer, choose the right sunscreen and use it correctly. Try these tips:  Choose a sunscreen that has an SPF (sun protection factor) of at least 30. Also choose a sunscreen labeled \"broad spectrum.” This will protect you from both UVA and UVB (ultraviolet A and B) rays.  If one brand irritates your skin, try another, such as one without fragrance.  Use a water-resistant sunscreen if you swim or sweat.  Use at least 1 ounce of sunscreen to cover exposed areas. This is enough to fill a shot glass. You might need to adjust the amount depending on your body size.  Put the sunscreen on dry skin about 15 minutes before going outdoors. This gives it time to soak in.  Reapply sunscreen every 2 hours. If you’re active, do this more often.  Cover any sun-exposed skin, from your face to your feet. Don’t forget your scalp, ears, and lips.  Know that while sunscreen helps protect you, it isn’t enough. Sunscreens extend the length of time you can be  outdoors before your skin starts to get red. But they don't give you total protection. Using sunscreen doesn't mean you can stay out in the sun for an unlimited time. Your skin cells are still being damaged. You should also wear protective clothing. And try to stay out of the sun as much as you can, especially from 10 a.m. to 4 p.m.  [employer] last reviewed this educational content on 7/1/2019 © 2000-2021 [employer]. All rights reserved. This information is not intended as a substitute for professional medical care. Always follow your healthcare professional's instructions.    Common Types of Skin Cancer  Skin cancer is a serious disease that can affect anyone at any age. It's the most common kind of cancer in the [place] If found early, when it's small and hasn't spread, skin cancer can often be treated with success. But in some cases, it's life-threatening.  Talk with your healthcare provider about what you can do to help prevent skin cancer. Ask about regular skin exams as part of your routine physicals. You can also ask about doing monthly self-exams of your skin. If you see any changes in your skin, call your healthcare provider right away.   Understanding the skin  The skin is made up of 3 layers: epidermis, dermis, and hypodermis or fat layer. The epidermis is the thin outer layer of the skin. It consists of 3 types of cells: squamous cells, basal cells, and melanocytes. The squamous cells are flat cells in the outermost layer and are continuously shed. The basal cells are round cells found just beneath the squamous cells at the base of the epidermis. The melanocytes are found in every layer of the epidermis and make melanin. This give the skin its color. The dermis layer is the middle layer of skin and consists of blood and lymph vessels, hair follicles, oil and sweat glands, nerves and collagen. This layer give skin flexibility and strength. The fat layer is the deepest layer consisting of fat  and collagen. It helps provide the body's heat and protects the body from injury.  Skin cancer is a type of cancer that grows in the epidermal layer of the skin. It can form in the basal cells, squamous cells, or melanocytes. Skin cancer can be grouped into 2 types: non-melanoma and melanoma. The 2 most common types of non-melanoma skin cancer are basal cell carcinoma and squamous cell carcinoma.  Basal cell cancer  Basal cell cancer is the most common skin cancer. It starts in basal cells in the deepest part of the epidermis. It's usually found on the face, ears, neck, trunk, or arms, but it can start anywhere. Varying in color, these lesions may be waxy, pearly, scaly, or scar-like. Tiny blood vessels can sometimes be seen through the lesion’s surface. These lesions can bleed easily and might not heal well. Nearly all basal cell cancers can be treated and cured.  Squamous cell cancer  Squamous cell cancer is another type of skin cancer. It starts in flat cells called squamous cells in the upper part of the epidermis. Lesions often form on the face, ears, neck, hands, or arms. They can start in scars and sores that don't heal. The lesions are firm, red bumps or flat, scaly, crusty growths. Squamous cell carcinoma is more likely to grow and spread to other parts of the body than basal cell carcinoma, although this is still uncommon. Most squamous cell carcinoma is found early enough to be treated and cured.  Melanoma  Melanoma is a less common, but much more dangerous kind of skin cancer. It starts in skin cells called melanocytes. It's more likely to grow and spread than basal or squamous cell cancers. It's often not easy to tell where a melanoma lesion’s borders are. It's often brown or black, but it may be mixed in color. The shape and size of melanoma lesions tend to differ from one side to the other. Melanoma can start anywhere, like the genitals, mouth, palms of hands, bottoms of feet, and under the  nails.  There are other types of skin cancer, too. These include Merkel cell cancer, Kaposi sarcoma, and skin (cutaneous) lymphomas, but these cancers are quite rare.  Actinic keratosis  Actinic keratosis is not skin cancer. It's a common, pre-cancer skin change that can turn into a squamous cell skin cancer over time if left untreated. Actinic keratosis lesions tend to appear on sun-exposed parts of the body. They can be pink, reddish-brown, or skin-colored. These lesions are most often small, raised, scaly, and rough, like sandpaper. In some cases, actinic keratosis lesions hurt. Most people with them have more than one lesion. Getting early treatment for actinic keratoses almost always cures the lesions.  [employer] last reviewed this educational content on 6/1/2019  © 1177-9409 [employer]. All rights reserved. This information is not intended as a substitute for professional medical care. Always follow your healthcare professional's instructions.

## 2024-01-23 ENCOUNTER — HOSPITAL ENCOUNTER (OUTPATIENT)
Age: 69
Setting detail: HOSPITAL OUTPATIENT SURGERY
Discharge: HOME | End: 2024-01-23
Attending: INTERNAL MEDICINE | Admitting: INTERNAL MEDICINE
Payer: MEDICARE

## 2024-01-23 ENCOUNTER — ANESTHESIA EVENT (OUTPATIENT)
Dept: ENDOSCOPY | Age: 69
End: 2024-01-23
Payer: MEDICARE

## 2024-01-23 ENCOUNTER — ANESTHESIA (OUTPATIENT)
Dept: ENDOSCOPY | Age: 69
End: 2024-01-23
Payer: MEDICARE

## 2024-01-23 VITALS
BODY MASS INDEX: 19.08 KG/M2 | WEIGHT: 123 LBS | HEIGHT: 67.5 IN | SYSTOLIC BLOOD PRESSURE: 105 MMHG | OXYGEN SATURATION: 99 % | HEART RATE: 57 BPM | DIASTOLIC BLOOD PRESSURE: 62 MMHG | RESPIRATION RATE: 16 BRPM

## 2024-01-23 DIAGNOSIS — Z12.11 COLON CANCER SCREENING: ICD-10-CM

## 2024-01-23 DIAGNOSIS — Z80.0 FAMILY HISTORY OF COLON CANCER: ICD-10-CM

## 2024-01-23 PROCEDURE — 88305 TISSUE EXAM BY PATHOLOGIST: CPT | Performed by: INTERNAL MEDICINE

## 2024-01-23 PROCEDURE — 45380 COLONOSCOPY AND BIOPSY: CPT | Performed by: INTERNAL MEDICINE

## 2024-01-23 PROCEDURE — 99070 SPECIAL SUPPLIES PHYS/QHP: CPT | Performed by: INTERNAL MEDICINE

## 2024-01-23 RX ORDER — LIDOCAINE HYDROCHLORIDE 10 MG/ML
INJECTION, SOLUTION EPIDURAL; INFILTRATION; INTRACAUDAL; PERINEURAL AS NEEDED
Status: DISCONTINUED | OUTPATIENT
Start: 2024-01-23 | End: 2024-01-23 | Stop reason: SURG

## 2024-01-23 RX ORDER — SODIUM CHLORIDE, SODIUM LACTATE, POTASSIUM CHLORIDE, CALCIUM CHLORIDE 600; 310; 30; 20 MG/100ML; MG/100ML; MG/100ML; MG/100ML
INJECTION, SOLUTION INTRAVENOUS CONTINUOUS
Status: DISCONTINUED | OUTPATIENT
Start: 2024-01-23 | End: 2024-01-23

## 2024-01-23 RX ORDER — SODIUM CHLORIDE, SODIUM LACTATE, POTASSIUM CHLORIDE, CALCIUM CHLORIDE 600; 310; 30; 20 MG/100ML; MG/100ML; MG/100ML; MG/100ML
INJECTION, SOLUTION INTRAVENOUS CONTINUOUS
OUTPATIENT
Start: 2024-01-23

## 2024-01-23 RX ORDER — NALOXONE HYDROCHLORIDE 0.4 MG/ML
0.08 INJECTION, SOLUTION INTRAMUSCULAR; INTRAVENOUS; SUBCUTANEOUS ONCE AS NEEDED
OUTPATIENT
Start: 2024-01-23 | End: 2024-01-23

## 2024-01-23 RX ADMIN — LIDOCAINE HYDROCHLORIDE 40 MG: 10 INJECTION, SOLUTION EPIDURAL; INFILTRATION; INTRACAUDAL; PERINEURAL at 13:18:00

## 2024-01-23 RX ADMIN — SODIUM CHLORIDE, SODIUM LACTATE, POTASSIUM CHLORIDE, CALCIUM CHLORIDE: 600; 310; 30; 20 INJECTION, SOLUTION INTRAVENOUS at 13:36:00

## 2024-01-23 RX ADMIN — SODIUM CHLORIDE, SODIUM LACTATE, POTASSIUM CHLORIDE, CALCIUM CHLORIDE: 600; 310; 30; 20 INJECTION, SOLUTION INTRAVENOUS at 13:23:00

## 2024-01-23 RX ADMIN — SODIUM CHLORIDE, SODIUM LACTATE, POTASSIUM CHLORIDE, CALCIUM CHLORIDE: 600; 310; 30; 20 INJECTION, SOLUTION INTRAVENOUS at 13:16:00

## 2024-01-23 NOTE — ANESTHESIA PREPROCEDURE EVALUATION
Anesthesia PreOp Note    HPI:     Eda Theodore is a 68 year old female who presents for preoperative consultation requested by: David Mcmillan MD    Date of Surgery: 1/23/2024    Procedure(s):  COLONOSCOPY  Indication: Colon cancer screening, Family history of colon cancer    Relevant Problems   No relevant active problems       NPO:  Last Liquid Consumption Date: 01/23/24  Last Liquid Consumption Time: 1015  Last Solid Consumption Date: 01/21/24  Last Solid Consumption Time: 1800  Last Liquid Consumption Date: 01/23/24          History Review:  Patient Active Problem List    Diagnosis Date Noted    Irregular heartbeat 02/14/2023    Mass of skin of left lower leg 02/14/2023    Bilateral impacted cerumen 02/14/2023    Pure hypercholesterolemia 02/14/2023    Left leg pain 01/31/2022    Osteopenia of multiple sites 12/12/2021    Hip pain 10/20/2021    Personal history of malignant neoplasm of breast 08/05/2020    Ashby's neuroma of right foot 10/31/2019    History of vitamin D deficiency 10/31/2019    Family history of breast cancer in first degree relative 11/18/2018    Routine health maintenance 10/15/2018    Lymphocytopenia 10/15/2018    Tinnitus of left ear 07/09/2018    Hearing loss 07/09/2018    Urinary incontinence 07/09/2018    Mild depression 07/09/2018    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 02/09/2017    Osteopenia of multiple sites 11/05/2016    Vitamin D deficiency 04/16/2009    Family history of malignant neoplasm of gastrointestinal tract 04/15/2009    History of left breast cancer 09/04/1987       Past Medical History:   Diagnosis Date    Alopecia areata     HAS HAD INJECTIONS OF STEROIDS INTO THE SCALP WITH DERM-DR NATHALIE DUTTON    Breast CA (HCC)     Cancer of overlapping sites of left female breast ([facility]) 09/04/1987    STAGE 3; CAF 6 CYCLES ; 12+ NODES; 30 XRT TX     Dense breast tissue on mammogram 11/05/2021    Depressive disorder, not elsewhere classified     ON [place]OLO[place]     Disorder of bone and cartilage, unspecified 2005    OSTEOPENIA; DEXA 4/4/08-NORMAL    Displacement of lumbar intervertebral disc without myelopathy     L4-5    Hypertonicity of bladder     MALIGN NEOPL BREAST NOS (HCC) 11/27/2007    Malignant neoplasm of breast (female), unspecified site 09/04/1987    STAGE 3; CAF 6 CYCLES ; 12+ NODES; 30 XRT TX    OSTEOPENIA 7/2010 borderline osteopenia (stopped Fosamax at this time)    Osteopenia of multiple sites 11/05/2016    Osteopenia of neck of left femur 11/05/2016    Osteoporosis     OTHER DISEASES DEXA 2008 (normal)    Personal history of antineoplastic chemotherapy     PONV (postoperative nausea and vomiting)     Unspecified vitamin D deficiency 04/16/2009    Vitamin D deficiency 04/16/2009       Past Surgical History:   Procedure Laterality Date    BREAST SURGERY PROCEDURE UNLISTED  1987    mastectomy    CHEMOTHERAPY  1987    COLONOSCOPY  3/2013    hemorrhoids. repeat 5 yrs, 2 day prep    COLONOSCOPY,DIAGNOSTIC  11/21/03    INTERNAL HEMORRHOIDS ; DUE FOR REPEAT IN 8-10 YRS WITH DR EPSTEIN    COLONOSCOPY,DIAGNOSTIC  3/1/2013    Procedure: COLONOSCOPY, POSSIBLE BIOPSY, POSSIBLE POLYPECTOMY 91735;  Surgeon: Nash Soler MD;  [facility]    HYSTERECTOMY      and USO    MASTECTOMY LEFT  09/1987    OOPHORECTOMY      per NG: x1    RADIATION LEFT Left 1988    REDUCTION RIGHT  1988    SPECIAL SERVICE OR REPORT      B/L BUNIONECTOMIES    SPECIAL SERVICE OR REPORT  AGE 31    Left MRM FOR STAGE 3 BREAST CA    SPECIAL SERVICE OR REPORT  AGE 32    LEFT BREAST RECONSTRUCTION WITH SALINE IMPLANT    SPECIAL SERVICE OR REPORT  AGE 32    RIGHT BREAST REDUCTION    VAGINAL HYSTERECTOMY      WITH ONE SO FOR CYST       Medications Prior to Admission   Medication Sig Dispense Refill Last Dose    sertraline 50 MG Oral Tab Take 1 tablet (50 mg total) by mouth daily. 90 tablet 3 1/23/2024    NON FORMULARY 2 scoops of collagen powder a day       Vitamin D3 50 MCG  (2000 UT) Oral Cap Take 50 mcg by mouth daily.       Calcium Carbonate-Vitamin D (CALCIUM 500 + D OR) Take by mouth.       Probiotic Product (ALIGN) Oral Cap Take  by mouth.       MULTI-VITAMIN/MINERALS OR TABS 1 TABLET DAILY       nitrofurantoin monohydrate macro 100 MG Oral Cap Take 1 capsule (100 mg total) by mouth 2 (two) times daily. 14 capsule 0     polyethylene glycol, PEG 3350-KCl-NaBcb-NaCl-NaSulf, 236 g Oral Recon Soln Take 4,000 mL by mouth As Directed. Take 2,000 mL the night before your procedure and 2,000 mL the morning of your procedure. 1 each 0      Current Facility-Administered Medications Ordered in Epic   Medication Dose Route Frequency Provider Last Rate Last Admin    lactated ringers infusion   Intravenous Continuous David Mcmillan MD 20 mL/hr at 01/23/24 1252 New Bag at 01/23/24 1252     No current [facility]-ordered outpatient medications on file.       Allergies   Allergen Reactions    Adhesive Tape HIVES    Penicillins HIVES       Family History   Problem Relation Age of Onset    Cancer Father         CLL; BCCA;SQCCA; PAROTID TUMOR; prostate ca, BCC, SCC    Heart Disorder Father         ARRHYTHMIA ON COUMADIN; AFIB    Ear Problems Father         per NG: hearing defeciency    Other (Other) Father     Hypertension Mother     Arthritis Mother         per NG: rheumatoid arthritis    Dementia Mother         lewey body dementia    Other (Other) Mother         RA;TB;PE &DVT    Breast Cancer Daughter 39        MyRisk negative (2016)    Cancer Maternal Grandmother 65        COLON    Stroke Maternal Grandfather     Other (Other) Maternal Grandfather         CVA    Cancer Paternal Grandmother         LUNG     Other (Other) Sister         OSTEOPENIA    Cancer Maternal Aunt 50        uterine ca    Cancer Maternal Aunt         thyroid/renal ca (non-smoker)    Breast Cancer Self 31     Social History     Socioeconomic History    Marital status:    Occupational History     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             Vital Signs:  Body mass index is 18.98 kg/m².   height is 1.715 m (5' 7.5\") and weight is 55.8 kg (123 lb). Her blood pressure is 129/76 and her pulse is 59. Her respiration is 14 and oxygen saturation is 99%.   Vitals:    01/16/24 1353 01/23/24 1245   BP:  129/76   Pulse:  59   Resp:  14   SpO2:  99%   Weight: 55.8 kg (123 lb)    Height: 1.715 m (5' 7.5\")         Anesthesia Evaluation      Airway   Mallampati: I  TM distance: >3 FB  Neck ROM: full  Dental          Pulmonary - negative ROS and normal exam   Cardiovascular - negative ROS and normal exam    Neuro/Psych      GI/Hepatic/Renal - negative ROS     Endo/Other - negative ROS   Abdominal                  Anesthesia Plan:   ASA:  2  Plan:   MAC  Plan Comments: I have discussed the anesthetic plan, major risks and alternatives with the patient and answered all questions. The patient desires to proceed with surgery and anesthesia as planned.     Informed Consent Plan and Risks Discussed With:  Patient      I have informed Eda A Loder and/or legal guardian or family member of the nature of the anesthetic plan, benefits, risks including possible dental damage if relevant, major complications, and any alternative forms of anesthetic management.   All of the patient's questions were answered to the best of my ability. The patient desires the anesthetic management as planned.  ELMRE BEASLEY DO  1/23/2024 1:09 PM  Present on Admission:  **None**

## 2024-01-23 NOTE — OPERATIVE REPORT
[facility] Report  Date of procedure-January 23, 2024    Preoperative Diagnosis:  -Colorectal cancer screening      Postoperative Diagnosis:  -Colon polyp x 1  -Diverticulosis  -Internal hemorrhoids      Procedure:    Colonoscopy     Surgeon:  David Mcmillan M.D.    Anesthesia:  MAC     Technique:  After informed consent, the patient was placed in the left lateral recumbent position.  Digital rectal examination revealed no palpable intraluminal abnormalities.  An Olympus variable stiffness 190 series HD colonoscope was inserted into the rectum and advanced under direct vision by following the lumen to the cecum.  The colon was examined upon withdrawal in the left lateral position.    The procedure was well tolerated without immediate complication.      Findings:  The preparation of the colon was good.  The terminal ileum was examined for 4 cm and visually normal.  The ileocecal valve was well preserved. The visualized colonic mucosa from the cecum to the anal verge was normal with an intact vascular pattern.    Sigmoid colon polyp x 1, diminutive removed by cold forceps technique.    Diverticular disease in the sigmoid colon, no diverticulitis.    Small internal hemorrhoids noted on retroflexed view.    Estimated blood loss-insignificant  Specimens-see above      Impression:  -Colon polyp x 1  -Diverticulosis  -Internal hemorrhoids    Recommendations:  - Post polypectomy instructions given  - Repeat colonoscopy in 5- 10 years  - High fiber diet for diverticular disease  - Symptomatic treatment of hemorrhoids          David Mcmillan MD  1/23/2024  1:42 PM

## 2024-01-23 NOTE — H&P
History & Physical Examination    Patient Name: Eda Theodore  MRN: [id number]  CSN: [id number]  YOB: 1955    Diagnosis:   Colorectal cancer screening Z12.11,Family hx of colon ca Z80.0       Medications Prior to Admission   Medication Sig Dispense Refill Last Dose    sertraline 50 MG Oral Tab Take 1 tablet (50 mg total) by mouth daily. 90 tablet 3 1/23/2024    NON FORMULARY 2 scoops of collagen powder a day       Vitamin D3 50 MCG (2000 UT) Oral Cap Take 50 mcg by mouth daily.       Calcium Carbonate-Vitamin D (CALCIUM 500 + D OR) Take by mouth.       Probiotic Product (ALIGN) Oral Cap Take  by mouth.       MULTI-VITAMIN/MINERALS OR TABS 1 TABLET DAILY       nitrofurantoin monohydrate macro 100 MG Oral Cap Take 1 capsule (100 mg total) by mouth 2 (two) times daily. 14 capsule 0     polyethylene glycol, PEG 3350-KCl-NaBcb-NaCl-NaSulf, 236 g Oral Recon Soln Take 4,000 mL by mouth As Directed. Take 2,000 mL the night before your procedure and 2,000 mL the morning of your procedure. 1 each 0      Current Facility-Administered Medications   Medication Dose Route Frequency    lactated ringers infusion   Intravenous Continuous       Allergies:   Allergies   Allergen Reactions    Adhesive Tape HIVES    Penicillins HIVES       Past Medical History:   Diagnosis Date    Alopecia areata     HAS HAD INJECTIONS OF STEROIDS INTO THE SCALP WITH DERM-DR NATHALIE DUTTON    Breast CA ([facility])     Cancer of overlapping sites of left female breast ([facility]) 09/04/1987    STAGE 3; CAF 6 CYCLES ; 12+ NODES; 30 XRT TX     Dense breast tissue on mammogram 11/05/2021    Depressive disorder, not elsewhere classified     ON ZOLOFT    Disorder of bone and cartilage, unspecified 2005    OSTEOPENIA; DEXA 4/4/08-NORMAL    Displacement of lumbar intervertebral disc without myelopathy     L4-5    Hypertonicity of bladder     MALIGN NEOPL BREAST NOS ([facility]) 11/27/2007    Malignant neoplasm of breast (female), unspecified site 09/04/1987     STAGE 3; CAF 6 CYCLES ; 12+ NODES; 30 XRT TX    OSTEOPENIA 7/2010 borderline osteopenia (stopped Fosamax at this time)    Osteopenia of multiple sites 11/05/2016    Osteopenia of neck of left femur 11/05/2016    Osteoporosis     OTHER DISEASES DEXA 2008 (normal)    Personal history of antineoplastic chemotherapy     PONV (postoperative nausea and vomiting)     Unspecified vitamin D deficiency 04/16/2009    Vitamin D deficiency 04/16/2009     Past Surgical History:   Procedure Laterality Date    BREAST SURGERY PROCEDURE UNLISTED  1987    mastectomy    CHEMOTHERAPY  1987    COLONOSCOPY  3/2013    hemorrhoids. repeat 5 yrs, 2 day prep    COLONOSCOPY,DIAGNOSTIC  11/21/03    INTERNAL HEMORRHOIDS ; DUE FOR REPEAT IN 8-10 YRS WITH DR EPSTEIN    COLONOSCOPY,DIAGNOSTIC  3/1/2013    Procedure: COLONOSCOPY, POSSIBLE BIOPSY, POSSIBLE POLYPECTOMY 90052;  Surgeon: Nash Soler MD;  Location: [facility], [facility]    HYSTERECTOMY      and USO    MASTECTOMY LEFT  09/1987    OOPHORECTOMY      per NG: x1    RADIATION LEFT Left 1988    REDUCTION RIGHT  1988    SPECIAL SERVICE OR REPORT      B/L BUNIONECTOMIES    SPECIAL SERVICE OR REPORT  AGE 31    Left MRM FOR STAGE 3 BREAST CA    SPECIAL SERVICE OR REPORT  AGE 32    LEFT BREAST RECONSTRUCTION WITH SALINE IMPLANT    SPECIAL SERVICE OR REPORT  AGE 32    RIGHT BREAST REDUCTION    VAGINAL HYSTERECTOMY      WITH ONE SO FOR CYST     Family History   Problem Relation Age of Onset    Cancer Father         CLL; BCCA;SQCCA; PAROTID TUMOR; prostate ca, BCC, SCC    Heart Disorder Father         ARRHYTHMIA ON COUMADIN; AFIB    Ear Problems Father         per NG: hearing defeciency    Other (Other) Father     Hypertension Mother     Arthritis Mother         per NG: rheumatoid arthritis    Dementia Mother         lewey body dementia    Other (Other) Mother         RA;TB;PE &DVT    Breast Cancer Daughter 39        MyRisk negative (2016)    Cancer Maternal Grandmother 65        COLON     Stroke Maternal Grandfather     Other (Other) Maternal Grandfather         CVA    Cancer Paternal Grandmother         LUNG     Other (Other) Sister         OSTEOPENIA    Cancer Maternal Aunt 50        uterine ca    Cancer Maternal Aunt         thyroid/renal ca (non-smoker)    Breast Cancer Self 31     Social History     Tobacco Use    Smoking status: Never    Smokeless tobacco: Never   Substance Use Topics    Alcohol use: Not Currently     Comment: ONE PER MTH       SYSTEM Check if Review is Normal Check if Physical Exam is Normal If not normal, please explain:   HEENT [x ] [ x]    NECK & BACK [x ] [x ]    HEART [x ] [ x]    LUNGS [x ] [ x]    ABDOMEN [x ] [x ]    UROGENITAL [ ] [ ]    EXTREMITIES [x ] [x ]    OTHER        [ x ] I have discussed the risks and benefits and alternatives with the patient/family.  They understand and agree to proceed with plan of care.  [ x ] I have reviewed the History and Physical done within the last 30 days.  Any changes noted above.    David Mcmillan MD  1/23/2024  1:03 PM

## 2024-01-23 NOTE — ANESTHESIA POSTPROCEDURE EVALUATION
Patient: Eda Theodore    Procedure Summary       Date: 01/23/24 Room / Location: [facility] ENDOSCOPY 01 / [facility]    Anesthesia Start: 1316 Anesthesia Stop: 1342    Procedure: COLONOSCOPY Diagnosis:       Colon cancer screening      Family history of colon cancer      (polyp,hemorrhoids, early diverticulosis)    Surgeons: David Mcmillan MD Anesthesiologist: Sandeep Murray DO    Anesthesia Type: MAC ASA Status: 2            Anesthesia Type: MAC    Vitals Value Taken Time   /58 01/23/24 1342   Temp  01/23/24 1346   Pulse 73 01/23/24 1342   Resp 16 01/23/24 1342   SpO2 99 % 01/23/24 1342       [facility] AN Post Evaluation:   Patient Evaluated in PACU  Patient Participation: complete - patient participated  Level of Consciousness: awake  Pain Management: adequate  Airway Patency:patent  Dental exam unchanged from preop  Yes    Cardiovascular Status: acceptable  Respiratory Status: acceptable  Postoperative Hydration acceptable      SANDEEP MURRAY DO  1/23/2024 1:46 PM

## 2024-01-23 NOTE — DISCHARGE INSTRUCTIONS
Home Care Instructions for Colonoscopy with Sedation    Diet:  - Resume your regular diet as tolerated unless otherwise instructed.  - Start with light meals to minimize bloating.  - Do not drink alcohol today.    Medication:  - If you have questions about resuming your normal medications, please contact your Primary Care Physician.    Activities:  - Take it easy today. Do not return to work today.  - Do not drive today.  - Do not operate any machinery today (including kitchen equipment).    Colonoscopy:  - You may notice some rectal \"spotting\" (a little blood on the toilet tissue) for a day or two after the exam. This is normal.  - If you experience any rectal bleeding (not spotting), persistent tenderness or sharp severe abdominal pains, oral temperature over 100 degrees Fahrenheit, light-headedness or dizziness, or any other problems, contact your doctor.    **If unable to reach your doctor, please go to the [facility] Emergency Room**    - Your referring physician will receive a full report of your examination.  - If you do not hear from your doctor's office within two weeks of your biopsy, please call them for your results.    You may be able to see your laboratory results in [employer] between 4 and 7 business days.  In some cases, your physician may not have viewed the results before they are released to [employer].  If you have questions regarding your results contact the physician who ordered the test/exam by phone or via [employer] by choosing \"Ask a Medical Question.\"

## 2024-01-29 ENCOUNTER — TELEPHONE (OUTPATIENT)
Facility: CLINIC | Age: 69
End: 2024-01-29

## 2024-01-30 NOTE — TELEPHONE ENCOUNTER
----- Message from David Mcmillan MD sent at 1/29/2024 10:26 AM CST -----  I wanted to get back to you with your colonoscopy results.  You had one colon polyp removed which was benign.  I would advise a repeat colonoscopy in 5 years to make sure no new polyps are forming.      You also have internal hemorrhoids and diverticulosis.  Please stay on a high fiber diet and call with any questions.

## 2024-01-30 NOTE — TELEPHONE ENCOUNTER
Recall colon in 5 years per Dr. Mcmillan. Colonoscopy done on 1/23/24.    Health maintenance updated and message sent to pt outreach in 5 years.    [employer] message from MD read by pt on 1/29/24.

## 2024-02-14 ENCOUNTER — OFFICE VISIT (OUTPATIENT)
Dept: INTERNAL MEDICINE CLINIC | Facility: CLINIC | Age: 69
End: 2024-02-14
Payer: MEDICARE

## 2024-02-14 DIAGNOSIS — G57.61 MORTON'S NEUROMA OF RIGHT FOOT: ICD-10-CM

## 2024-02-14 DIAGNOSIS — E55.9 VITAMIN D DEFICIENCY: ICD-10-CM

## 2024-02-14 DIAGNOSIS — H91.90 HEARING LOSS, UNSPECIFIED HEARING LOSS TYPE, UNSPECIFIED LATERALITY: ICD-10-CM

## 2024-02-14 DIAGNOSIS — Z83.2 FAMILY HISTORY OF PROTEIN C DEFICIENCY: ICD-10-CM

## 2024-02-14 DIAGNOSIS — E53.8 VITAMIN B12 DEFICIENCY: ICD-10-CM

## 2024-02-14 DIAGNOSIS — E53.8 B12 DEFICIENCY: ICD-10-CM

## 2024-02-14 DIAGNOSIS — Z00.00 MEDICARE ANNUAL WELLNESS VISIT, SUBSEQUENT: Primary | ICD-10-CM

## 2024-02-14 DIAGNOSIS — E78.00 PURE HYPERCHOLESTEROLEMIA: ICD-10-CM

## 2024-02-14 DIAGNOSIS — Z78.0 POSTMENOPAUSAL: ICD-10-CM

## 2024-02-14 DIAGNOSIS — I73.00 RAYNAUD'S PHENOMENON WITHOUT GANGRENE: ICD-10-CM

## 2024-02-14 DIAGNOSIS — M85.89 OSTEOPENIA OF MULTIPLE SITES: ICD-10-CM

## 2024-02-14 DIAGNOSIS — F32.A MILD DEPRESSION: ICD-10-CM

## 2024-02-14 DIAGNOSIS — H93.12 TINNITUS, LEFT EAR: ICD-10-CM

## 2024-02-14 DIAGNOSIS — Z85.3 PERSONAL HISTORY OF MALIGNANT NEOPLASM OF BREAST: ICD-10-CM

## 2024-02-14 PROCEDURE — 99214 OFFICE O/P EST MOD 30 MIN: CPT | Performed by: INTERNAL MEDICINE

## 2024-02-14 PROCEDURE — G0438 PPPS, INITIAL VISIT: HCPCS | Performed by: INTERNAL MEDICINE

## 2024-02-14 NOTE — PROGRESS NOTES
Subjective:   Eda Theodore is a 68 year old female who presents for a Medicare Initial Annual Wellness visit (Once after 12 month Medicare anniversary) .   Patient presents today for  Medicare  physical exam, states doing well otherwise, patient states she is taking her medications regularly including vitamin D she takes calcium supplements also on sertraline 50 mg daily that is working well for her for depression and anxiety.    Patient states she has history of left ear ringing sensation and hearing loss  Denies chest pain, shortness of breath, dyspnea on exertion or heart palpitations also denies nausea, vomiting, diarrhea, constipation or abdominal pain. No fever, chills, or UTI symptoms.   The rest of the review of systems, see below.         History/Other:   Fall Risk Assessment:   She has been screened for Falls and is low risk.      Cognitive Assessment:   She had a completely normal cognitive assessment - see flowsheet entries     Functional Ability/Status:   Eda Theodore has some abnormal functions as listed below:  She has Hearing problems based on screening of functional status.        Depression Screening (PHQ-2/PHQ-9): PHQ-2 SCORE: 0  , done 2/13/2024   Last [place] Suicide Screening on 2/14/2024 was No Risk.     Advanced Directives:   She does have a Living Will but we do NOT have it on file in Epic.    She does have a POA but we do NOT have it on file in Epic.    Discussed Advance Care Planning with patient (and family/surrogate if present). Standard forms made available to patient in After Visit Summary.      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    Tinnitus, left ear    Hearing loss    Urinary incontinence    Mild depression    Routine health maintenance    Lymphocytopenia     Family history of breast cancer in first degree relative    Ashby's neuroma of right foot    History of vitamin D deficiency    Personal history of malignant neoplasm of breast    Hip pain    Osteopenia of multiple sites    Left leg pain    Irregular heartbeat    Mass of skin of left lower leg    Bilateral impacted cerumen    Pure hypercholesterolemia    Raynaud's phenomenon without gangrene    Family history of protein C deficiency     Allergies:  She is allergic to adhesive tape and penicillins.    Current Medications:  Outpatient Medications Marked as Taking for the 2/14/24 encounter (Office Visit) with Ruby Silva MD   Medication Sig    sertraline 50 MG Oral Tab Take 1 tablet (50 mg total) by mouth daily.    NON FORMULARY 2 scoops of collagen powder a day    Vitamin D3 50 MCG (2000 UT) Oral Cap Take 50 mcg by mouth daily.    Calcium Carbonate-Vitamin D (CALCIUM 500 + D OR) Take by mouth.    Probiotic Product (ALIGN) Oral Cap Take  by mouth.    MULTI-VITAMIN/MINERALS OR TABS 1 TABLET DAILY       Medical History:  She  has a past medical history of Alopecia areata, Breast CA ([facility]), Cancer of overlapping sites of left female breast (HCC) (09/04/1987), Dense breast tissue on mammogram (11/05/2021), Depressive disorder, not elsewhere classified, Disorder of bone and cartilage, unspecified (2005), Displacement of lumbar intervertebral disc without myelopathy, Hypertonicity of bladder, MALIGN NEOPL BREAST NOS (HCC) (11/27/2007), Malignant neoplasm of breast (female), unspecified site (09/04/1987), OSTEOPENIA (7/2010 borderline osteopenia (stopped Fosamax at this time)), Osteopenia of multiple sites (11/05/2016), Osteopenia of neck of left femur (11/05/2016), Osteoporosis, OTHER DISEASES (DEXA 2008 (normal)), Personal history of antineoplastic chemotherapy, PONV (postoperative nausea and vomiting), Unspecified vitamin D deficiency (04/16/2009), and Vitamin D deficiency (04/16/2009).  Surgical History:  She  has  a past surgical history that includes vaginal hysterectomy; colonoscopy,diagnostic (11/21/03); special service or report; special service or report (AGE 31); special service or report (AGE 32); special service or report (AGE 32); hysterectomy; colonoscopy (3/2013); colonoscopy,diagnostic (3/1/2013); oophorectomy; breast surgery procedure unlisted (1987); mastectomy left (09/1987); chemotherapy (1987); radiation left (Left, 1988); reduction right (1988); and colonoscopy (N/A, 1/23/2024).   Family History:  Her family history includes Arthritis in her mother; Breast Cancer (age of onset: 31) in her self; Breast Cancer (age of onset: 39) in her daughter; Cancer in her father, maternal aunt, and paternal grandmother; Cancer (age of onset: 50) in her maternal aunt; Cancer (age of onset: 65) in her maternal grandmother; Dementia in her mother; Ear Problems in her father; Heart Disorder in her father; Hypertension in her mother; Other in her father, maternal grandfather, mother, and sister; Stroke in her maternal grandfather.  Social History:  She  reports that she has never smoked. She has never used smokeless tobacco. She reports that she does not currently use alcohol. She reports that she does not use drugs.    Tobacco:  She has never smoked tobacco.    CAGE Alcohol Screen:   CAGE screening score of 0 on 2/13/2024, showing low risk of alcohol abuse.      Patient Care Team:  Ruby Silva MD as PCP - General (Internal Medicine)  Fadi Mendez MD as PCP - Internist (Internal Medicine)    Review of Systems  GENERAL: feels well otherwise  SKIN: denies any unusual skin lesions cold her toes of the feet bilaterally  ,no cyanosis or ulceration  EYES: denies blurred vision or double vision  HEENT: denies nasal congestion, sinus pain or ST has tinnitus left ear and hearing loss denies any ear pain  LUNGS: denies shortness of breath with exertion  CARDIOVASCULAR: denies chest pain on exertion  GI: denies abdominal pain, denies  heartburn  : denies dysuria, vaginal discharge or itching, no complaint of urinary incontinence   MUSCULOSKELETAL: denies back pain  NEURO: denies headaches  PSYCHE: denies depression or anxiety  HEMATOLOGIC: denies hx of anemia  ENDOCRINE: denies thyroid history  ALL/ASTHMA: denies hx of allergy or asthma    Objective:   Physical Exam  General Appearance:  Alert, cooperative, no distress, appears stated age   Head:  Normocephalic, without obvious abnormality, atraumatic   Eyes:  PERRL, conjunctiva/corneas clear, EOM's intact both eyes   Ears:  Normal TM's and external ear canals, both ears   Nose: Nares normal, septum midline,mucosa normal, no drainage or sinus tenderness   Throat: Lips, mucosa, and tongue normal;  gums normal   Neck: Supple, symmetrical, trachea midline, no adenopathy;  thyroid: not enlarged, symmetric, no tenderness/mass/nodules; no carotid bruit or JVD   Back:   Symmetric, no curvature, ROM normal, no CVA tenderness   Lungs:   Clear to auscultation bilaterally, respirations unlabored   Heart:  irregular  rate  and rhythm, S1 and S2 normal, no murmur, rub, or gallop    Left breast mastectomy - breast  Implant    Abdomen:   Soft, non-tender, bowel sounds active all four quadrants,  no masses, no organomegaly   Pelvic: Deferred    Extremities: Extremities normal, atraumatic, no cyanosis or edema   Pulses: 2+ and symmetric   Skin: Skin color, texture, turgor normal, no rashes or lesions colder toes bilaterally no cyanosis or ulceration erosions   Lymph nodes: Cervical, supraclavicular, and axillary nodes normal   Neurologic: Normal       BP 90/60   Pulse 87   Ht 5' 7.5\" (1.715 m)   Wt 124 lb (56.2 kg)   BMI 19.13 kg/m²  Estimated body mass index is 19.13 kg/m² as calculated from the following:    Height as of this encounter: 5' 7.5\" (1.715 m).    Weight as of this encounter: 124 lb (56.2 kg).    Medicare Hearing Assessment:   Hearing Screening    Time taken: 2/14/2024 10:21 AM  Entry User: Rosalee  SWETA Garcia  Screening Method: Finger Rub  Finger Rub Result: Pass               Visual Acuity:   Right Eye Visual Acuity: Corrected Right Eye Chart Acuity: 20/100   Left Eye Visual Acuity: Corrected Left Eye Chart Acuity: 20/70   Both Eyes Visual Acuity: Corrected Both Eyes Chart Acuity: 20/40            Assessment & Plan:   Eda Theodore is a 68 year old female who presents for a Medicare Assessment.   1. Medicare annual wellness visit, initial  Maintain a healthy diet , low saturated fat and low sugar diet  Keep good hydration  Maintain a regular activity /walking as tolerated   Complete labs as ordered,   Pap smear -  hysterectomy  1 ovary removed -20 years ago at  age 34  - 1989 had large  cyst   Mammogram  With Dr Whiteside - hx left breast  36   years ago stage 3  - left mastectomy chemo and  Rtg ,  tamoxifen for 15  years   dexa   Scan utd 2022 - f/u Dr Whiteside    colonoscopy utd 1/29 -  f/u   5 years   Preventative health maintenance tests reviewed   Immunizations reviewed  utd - recommended RSV   Advanced  directives  discussed w pt -  Poa -Daughter   Patient verbalized understanding and compliance   Blood pressure low today I recommend to increase the intake-  Likely some dehydration      2. Vitamin D deficiency  Labs , cpm   Vit d 2000 u  qd    3. Mild depression  Stable  cpm   Sertraline 50 mg every day   Se  discussed -        Left ear left tinnitus   hearing loss tinituss - will see  ENT patient is concern of Sectral in side effects and directions discussed with patient she also to discuss with ENT    Raynaud's phenomena  Feet toes cold  Recommend to keep with layers of the warmer close drink warm water    Walk exercise regularly amlodipine discussed with patient  At this time patient like to keep with conservative management she prefers not to take medication since she feels she is doing well right now  Will let me know if any concerns or worsening symptoms.      4. Hearing loss, unspecified hearing loss  type, unspecified laterality  Tinnitus - left ear -    Ent referred     5. Osteopenia of multiple sites  dexa  Scan utd  Vit  D  Every day   1000 mg calcium in diet   Vit  d every day 2000 u daily     6. Varicose veins of left lower extremity with complications  Stable cpm  Elastic socks   Walking     7. History of left breast cancer  At age 30 hx  Hx  left mastectomy   Chemo rtg , tamoxifen for 15 years  Stable cpm   F/u Dr Whiteside   Mammogram per Dr Whiteside     Daughter had breast   Ca   39 years old   Bill mastectomy       Pure hypercholesterolemia (Primary)  Keep low saturated fat  diet   Avoid red meat and fast, fried food  Take fruits and vegetables   Keep active /walking ,exercise as  tolerated  Reach healthy weight   Continue present management   Labs to monitor   Ordered     -     CBC With Differential With Platelet; Future; Expected date: 02/14/2023  -     Comp Metabolic Panel (14); Future; Expected date: 02/14/2023  -     Lipid Panel; Future; Expected date: 02/14/2023  -     Urinalysis with Culture Reflex; Future; Expected date: 02/14/2023  -     TSH W Reflex To Free T4; Future; Expected date: 02/14/2023    Overview:  STAGE 3; CAF 6 CYCLES ; 30 XRT TX    Protein C deficiency   Refer to hematology for further  testing   Fluids - keep with good  water hydration 60 ounces per day   The patient indicates understanding of these issues and agrees to the plan.  Continue with current treatment plan.  Reinforced healthy diet, lifestyle, and exercise.      Return in about 3 months (around 5/14/2024), or if symptoms worsen or fail to improve, for Follow up visit.     Ruby Silva MD, 2/14/2023     Supplementary Documentation:   General Health:  In the past six months, have you lost more than 10 pounds without trying?: 2 - No  Has your appetite been poor?: No  Type of Diet: Balanced  How does the patient maintain a good energy level?: Appropriate Exercise;Stretching  How would you describe your daily physical  activity?: Moderate  How would you describe your current health state?: Good  How do you maintain positive mental well-being?: Visiting Friends;Visiting Family;Social Interaction  On a scale of 0 to 10, with 0 being no pain and 10 being severe pain, what is your pain level?: 0 - (None)  In the past six months, have you experienced urine leakage?: 1-Yes  At any time do you feel concerned for the safety/well-being of yourself and/or your children, in your home or elsewhere?: No  Have you had any immunizations at another office such as Influenza, Hepatitis B, Tetanus, or Pneumococcal?: Yes       Eda Theodore's SCREENING SCHEDULE   Tests on this list are recommended by your physician but may not be covered, or covered at this frequency, by your insurer.   Please check with your insurance carrier before scheduling to verify coverage.   PREVENTATIVE SERVICES FREQUENCY &  COVERAGE DETAILS LAST COMPLETION DATE   Diabetes Screening    Fasting Blood Sugar /  Glucose    One screening every 12 months if never tested or if previously tested but not diagnosed with pre-diabetes   One screening every 6 months if diagnosed with pre-diabetes Lab Results   Component Value Date    GLUCOSE 77 09/09/2013    GLU 87 02/15/2024        Cardiovascular Disease Screening    Lipid Panel  Cholesterol  Lipoprotein (HDL)  Triglycerides Covered every 5 years for all Medicare beneficiaries without apparent signs or symptoms of cardiovascular disease Lab Results   Component Value Date    CHOLEST 198 02/15/2024    HDL 65 (H) 02/15/2024     (H) 02/15/2024    TRIG 42 02/15/2024         Electrocardiogram (EKG)   Covered if needed at [facility] to Medicare, and non-screening if indicated for medical reasons 02/14/2023      Ultrasound Screening for Abdominal Aortic Aneurysm (AAA) Covered once in a lifetime for one of the following risk factors    Men who are 65-75 years old and have ever smoked    Anyone with a family history -     Colorectal Cancer  Screening  Covered for ages 50-85; only need ONE of the following:    Colonoscopy   Covered every 10 years    Covered every 2 years if patient is at high risk or previous colonoscopy was abnormal 01/23/2024    Health Maintenance   Topic Date Due    Colorectal Cancer Screening  01/23/2029       Flexible Sigmoidoscopy   Covered every 4 years -    Fecal Occult Blood Test Covered annually -   Bone Density Screening    Bone density screening    Covered every 2 years after age 65 if diagnosed with risk of osteoporosis or estrogen deficiency.    Covered yearly for long-term glucocorticoid medication use (Steroids) Last Dexa Scan:    XR DEXA BONE DENSITOMETRY (CPT=77080) 01/12/2022      No recommendations at this time   Pap and Pelvic    Pap   Covered every 2 years for women at normal risk; Annually if at high risk -  No recommendations at this time    Chlamydia Annually if high risk -  No recommendations at this time   Screening Mammogram    Mammogram     Recommend annually for all female patients aged 40 and older    One baseline mammogram covered for patients aged 35-39 11/06/2023    Health Maintenance   Topic Date Due    Mammogram  11/06/2024       Immunizations    Influenza Covered once per flu season  Please get every year 10/20/2023  No recommendations at this time    Pneumococcal Each vaccine ([id number] & [id number]) covered once after 65 Prevnar 13: -    Kiygcltof97: 10/04/2017     No recommendations at this time    Hepatitis B One screening covered for patients with certain risk factors   -  No recommendations at this time    Tetanus Toxoid Not covered by Medicare Part B unless medically necessary (cut with metal); may be covered with your pharmacy prescription benefits 03/21/2005    Tetanus, Diptheria and Pertusis TD and TDaP Not covered by Medicare Part B -  No recommendations at this time    Zoster Not covered by Medicare Part B; may be covered with your pharmacy  prescription benefits -  No recommendations at  this time

## 2024-02-15 ENCOUNTER — LAB ENCOUNTER (OUTPATIENT)
Dept: LAB | Age: 69
End: 2024-02-15
Attending: INTERNAL MEDICINE
Payer: MEDICARE

## 2024-02-15 DIAGNOSIS — E55.9 VITAMIN D DEFICIENCY: ICD-10-CM

## 2024-02-15 DIAGNOSIS — E53.8 VITAMIN B12 DEFICIENCY: ICD-10-CM

## 2024-02-15 DIAGNOSIS — E78.00 PURE HYPERCHOLESTEROLEMIA: ICD-10-CM

## 2024-02-15 DIAGNOSIS — I73.00 RAYNAUD'S PHENOMENON WITHOUT GANGRENE: ICD-10-CM

## 2024-02-15 DIAGNOSIS — M79.675 PAIN IN TOES OF BOTH FEET: ICD-10-CM

## 2024-02-15 DIAGNOSIS — M79.674 PAIN IN TOES OF BOTH FEET: ICD-10-CM

## 2024-02-15 LAB
ALBUMIN SERPL-MCNC: 4.6 G/DL (ref 3.2–4.8)
ALBUMIN/GLOB SERPL: 1.4 {RATIO} (ref 1–2)
ALP LIVER SERPL-CCNC: 60 U/L
ALT SERPL-CCNC: 21 U/L
ANION GAP SERPL CALC-SCNC: 4 MMOL/L (ref 0–18)
AST SERPL-CCNC: 30 U/L (ref ?–34)
BASOPHILS # BLD AUTO: 0.02 X10(3) UL (ref 0–0.2)
BASOPHILS NFR BLD AUTO: 0.5 %
BILIRUB SERPL-MCNC: 0.9 MG/DL (ref 0.2–1.1)
BUN BLD-MCNC: 22 MG/DL (ref 9–23)
BUN/CREAT SERPL: 24.7 (ref 10–20)
CALCIUM BLD-MCNC: 9.9 MG/DL (ref 8.7–10.4)
CHLORIDE SERPL-SCNC: 103 MMOL/L (ref 98–112)
CHOLEST SERPL-MCNC: 198 MG/DL (ref ?–200)
CO2 SERPL-SCNC: 31 MMOL/L (ref 21–32)
CREAT BLD-MCNC: 0.89 MG/DL
DEPRECATED RDW RBC AUTO: 40.8 FL (ref 35.1–46.3)
EGFRCR SERPLBLD CKD-EPI 2021: 71 ML/MIN/1.73M2 (ref 60–?)
EOSINOPHIL # BLD AUTO: 0.32 X10(3) UL (ref 0–0.7)
EOSINOPHIL NFR BLD AUTO: 8 %
ERYTHROCYTE [DISTWIDTH] IN BLOOD BY AUTOMATED COUNT: 11.9 % (ref 11–15)
FASTING PATIENT LIPID ANSWER: YES
FASTING STATUS PATIENT QL REPORTED: YES
GLOBULIN PLAS-MCNC: 3.2 G/DL (ref 2.8–4.4)
GLUCOSE BLD-MCNC: 87 MG/DL (ref 70–99)
HCT VFR BLD AUTO: 45.5 %
HDLC SERPL-MCNC: 65 MG/DL (ref 40–59)
HGB BLD-MCNC: 15.2 G/DL
IMM GRANULOCYTES # BLD AUTO: 0.01 X10(3) UL (ref 0–1)
IMM GRANULOCYTES NFR BLD: 0.2 %
LDLC SERPL CALC-MCNC: 125 MG/DL (ref ?–100)
LYMPHOCYTES # BLD AUTO: 1.41 X10(3) UL (ref 1–4)
LYMPHOCYTES NFR BLD AUTO: 35.2 %
MCH RBC QN AUTO: 31.3 PG (ref 26–34)
MCHC RBC AUTO-ENTMCNC: 33.4 G/DL (ref 31–37)
MCV RBC AUTO: 93.6 FL
MONOCYTES # BLD AUTO: 0.26 X10(3) UL (ref 0.1–1)
MONOCYTES NFR BLD AUTO: 6.5 %
NEUTROPHILS # BLD AUTO: 1.99 X10 (3) UL (ref 1.5–7.7)
NEUTROPHILS # BLD AUTO: 1.99 X10(3) UL (ref 1.5–7.7)
NEUTROPHILS NFR BLD AUTO: 49.6 %
NONHDLC SERPL-MCNC: 133 MG/DL (ref ?–130)
OSMOLALITY SERPL CALC.SUM OF ELEC: 289 MOSM/KG (ref 275–295)
PLATELET # BLD AUTO: 226 10(3)UL (ref 150–450)
POTASSIUM SERPL-SCNC: 3.8 MMOL/L (ref 3.5–5.1)
PROT SERPL-MCNC: 7.8 G/DL (ref 5.7–8.2)
RBC # BLD AUTO: 4.86 X10(6)UL
RHEUMATOID FACT SERPL-ACNC: <10 IU/ML (ref ?–14)
SODIUM SERPL-SCNC: 138 MMOL/L (ref 136–145)
TRIGL SERPL-MCNC: 42 MG/DL (ref 30–149)
TSI SER-ACNC: 2.84 MIU/ML (ref 0.55–4.78)
VIT B12 SERPL-MCNC: 989 PG/ML (ref 211–911)
VIT D+METAB SERPL-MCNC: 51.2 NG/ML (ref 30–100)
VLDLC SERPL CALC-MCNC: 7 MG/DL (ref 0–30)
WBC # BLD AUTO: 4 X10(3) UL (ref 4–11)

## 2024-02-15 PROCEDURE — 86039 ANTINUCLEAR ANTIBODIES (ANA): CPT

## 2024-02-15 PROCEDURE — 86334 IMMUNOFIX E-PHORESIS SERUM: CPT

## 2024-02-15 PROCEDURE — 86225 DNA ANTIBODY NATIVE: CPT

## 2024-02-15 PROCEDURE — 80061 LIPID PANEL: CPT

## 2024-02-15 PROCEDURE — 36415 COLL VENOUS BLD VENIPUNCTURE: CPT

## 2024-02-15 PROCEDURE — 82306 VITAMIN D 25 HYDROXY: CPT

## 2024-02-15 PROCEDURE — 85598 HEXAGNAL PHOSPH PLTLT NEUTRL: CPT

## 2024-02-15 PROCEDURE — 82607 VITAMIN B-12: CPT

## 2024-02-15 PROCEDURE — 86146 BETA-2 GLYCOPROTEIN ANTIBODY: CPT

## 2024-02-15 PROCEDURE — 83521 IG LIGHT CHAINS FREE EACH: CPT

## 2024-02-15 PROCEDURE — 86147 CARDIOLIPIN ANTIBODY EA IG: CPT

## 2024-02-15 PROCEDURE — 80053 COMPREHEN METABOLIC PANEL: CPT

## 2024-02-15 PROCEDURE — 85730 THROMBOPLASTIN TIME PARTIAL: CPT

## 2024-02-15 PROCEDURE — 84165 PROTEIN E-PHORESIS SERUM: CPT

## 2024-02-15 PROCEDURE — 85025 COMPLETE CBC W/AUTO DIFF WBC: CPT

## 2024-02-15 PROCEDURE — 86431 RHEUMATOID FACTOR QUANT: CPT

## 2024-02-15 PROCEDURE — 84443 ASSAY THYROID STIM HORMONE: CPT

## 2024-02-15 PROCEDURE — 86038 ANTINUCLEAR ANTIBODIES: CPT

## 2024-02-15 PROCEDURE — 85390 FIBRINOLYSINS SCREEN I&R: CPT

## 2024-02-15 PROCEDURE — 85613 RUSSELL VIPER VENOM DILUTED: CPT

## 2024-02-15 PROCEDURE — 86235 NUCLEAR ANTIGEN ANTIBODY: CPT

## 2024-02-15 PROCEDURE — 85610 PROTHROMBIN TIME: CPT

## 2024-02-16 VITALS
BODY MASS INDEX: 19.24 KG/M2 | WEIGHT: 124 LBS | HEIGHT: 67.5 IN | SYSTOLIC BLOOD PRESSURE: 90 MMHG | DIASTOLIC BLOOD PRESSURE: 60 MMHG | HEART RATE: 87 BPM

## 2024-02-16 LAB
APTT PPP: 26.8 SECONDS (ref 23.3–35.6)
B2 GLYCOPROT1 IGG SERPL IA-ACNC: 1 U/ML (ref ?–7)
B2 GLYCOPROT1 IGM SERPL IA-ACNC: <2.4 U/ML (ref ?–7)
CARDIOLIPIN IGG SERPL-ACNC: 1.7 GPL (ref ?–10)
CARDIOLIPIN IGM SERPL-ACNC: 6.9 MPL (ref ?–10)
CONFIRM APTT STACLOT: NEGATIVE
CONFIRM DRVVT: 1 S (ref 0–1.1)
PROTHROMBIN TIME: 13.3 SECONDS (ref 11.6–14.8)

## 2024-02-17 NOTE — PROGRESS NOTES
Results reviewed. Released to [employer]. Tests show no significant abnormalities in lupus anticoagulant, autoimmune tests negative so far

## 2024-02-18 ENCOUNTER — PATIENT MESSAGE (OUTPATIENT)
Dept: INTERNAL MEDICINE CLINIC | Facility: CLINIC | Age: 69
End: 2024-02-18

## 2024-02-19 LAB — NUCLEAR IGG TITR SER IF: POSITIVE {TITER}

## 2024-02-19 NOTE — TELEPHONE ENCOUNTER
From: Eda Theodore  To: Ruby Shira  Sent: 2/18/2024 11:24 PM CST  Subject: Lab results    Thank you for your message explaining my lab results. I was a bit surprised with my cholesterol numbers; I believe they were the highest I can recall. I haven’t eaten any red meat in years and very rarely eat fried food, but do eat a lot of cheese so perhaps that is one factor. I will watch my fat intake going forward.     I also noticed that my BUN/Creatine Ratio was listed as above normal. I take it that is not overly concerning?    Thanks again,  Eda

## 2024-02-20 LAB
ALBUMIN SERPL ELPH-MCNC: 4.44 G/DL (ref 3.75–5.21)
ALBUMIN/GLOB SERPL: 1.5 {RATIO} (ref 1–2)
ALPHA1 GLOB SERPL ELPH-MCNC: 0.3 G/DL (ref 0.19–0.46)
ALPHA2 GLOB SERPL ELPH-MCNC: 0.7 G/DL (ref 0.48–1.05)
ANA NUCLEOLAR TITR SER IF: 160 {TITER}
B-GLOBULIN SERPL ELPH-MCNC: 0.84 G/DL (ref 0.68–1.23)
CENTROMERE IGG SER-ACNC: <0.4 U/ML
DSDNA AB TITR SER: <10 {TITER}
ENA JO1 AB SER IA-ACNC: <0.3 U/ML
ENA RNP IGG SER IA-ACNC: 3.9 U/ML
ENA SCL70 IGG SER IA-ACNC: <0.6 U/ML
ENA SM IGG SER IA-ACNC: <0.7 U/ML
ENA SS-A IGG SER IA-ACNC: <0.4 U/ML
ENA SS-B IGG SER IA-ACNC: <0.4 U/ML
GAMMA GLOB SERPL ELPH-MCNC: 1.11 G/DL (ref 0.62–1.7)
KAPPA LC FREE SER-MCNC: 1.6 MG/DL (ref 0.33–1.94)
KAPPA LC FREE/LAMBDA FREE SER NEPH: 1.25 {RATIO} (ref 0.26–1.65)
LAMBDA LC FREE SERPL-MCNC: 1.27 MG/DL (ref 0.57–2.63)
PROT SERPL-MCNC: 7.4 G/DL (ref 5.7–8.2)
U1 SNRNP IGG SER IA-ACNC: 2.6 U/ML

## 2024-02-20 NOTE — TELEPHONE ENCOUNTER
From  Bessie Riley RN To  Eda Theodore Sent and Delivered  2/20/2024  8:44 AM   Last Read in MyChart  2/20/2024  8:45 AM by Eda Theodore

## 2024-02-20 NOTE — TELEPHONE ENCOUNTER
Yes cholesterol is mildly elevated- could be elevated due to too much cheese intake she is you can cut down on cheese in her diet , butter ...and try to be active as tolerated  You have pretty good good cholesterol that is some balancing out    -Will recheck cholesterol in 6 to 12 months     BUN/creatinine ratio-mildly elevated no concern but that would present dehydration-on that day      Keep with good water hydration 50 to 60 ounces of water per day-

## 2024-03-22 ENCOUNTER — TELEPHONE (OUTPATIENT)
Facility: CLINIC | Age: 69
End: 2024-03-22

## 2024-03-22 NOTE — TELEPHONE ENCOUNTER
Patient calling to speak to RN, states looks like is being billed for procedure twice. Already spoke to insurance and billing and stated to speak ton nena. Please call.

## 2024-03-22 NOTE — TELEPHONE ENCOUNTER
I spoke to Eda  Her procedure was in January.  She was billed twice for the same service which was the pathology for her procedure which is why it wasn't covered.  She was billed once by the hospital and once from associated pathology so she owes $52.28 since she is billed twice.    I gave her the phone number to patient advocate Jessica Florentino as advised by my manager since she already called her insurance and billing and they could not help.  She wrote down the number and will call.

## 2024-03-22 NOTE — TELEPHONE ENCOUNTER
RN clinical staff are unable to view bills/address billing concerns like this.  I sent a message to my manager as to what to do with this message.

## 2024-03-27 ENCOUNTER — OFFICE VISIT (OUTPATIENT)
Dept: OTOLARYNGOLOGY | Facility: CLINIC | Age: 69
End: 2024-03-27
Payer: MEDICARE

## 2024-03-27 ENCOUNTER — OFFICE VISIT (OUTPATIENT)
Dept: AUDIOLOGY | Facility: CLINIC | Age: 69
End: 2024-03-27

## 2024-03-27 VITALS — HEIGHT: 67.5 IN | WEIGHT: 124 LBS | BODY MASS INDEX: 19.24 KG/M2

## 2024-03-27 DIAGNOSIS — H93.19 TINNITUS, UNSPECIFIED LATERALITY: ICD-10-CM

## 2024-03-27 DIAGNOSIS — H90.3 SENSORINEURAL HEARING LOSS, BILATERAL: Primary | ICD-10-CM

## 2024-03-27 DIAGNOSIS — H90.3 BILATERAL SENSORINEURAL HEARING LOSS: ICD-10-CM

## 2024-03-27 DIAGNOSIS — H61.23 BILATERAL IMPACTED CERUMEN: ICD-10-CM

## 2024-03-27 DIAGNOSIS — H93.12 LEFT-SIDED TINNITUS: Primary | ICD-10-CM

## 2024-03-27 PROCEDURE — 92557 COMPREHENSIVE HEARING TEST: CPT | Performed by: AUDIOLOGIST

## 2024-03-27 PROCEDURE — 92567 TYMPANOMETRY: CPT | Performed by: AUDIOLOGIST

## 2024-03-27 PROCEDURE — 99213 OFFICE O/P EST LOW 20 MIN: CPT | Performed by: SPECIALIST

## 2024-03-28 PROBLEM — H90.3 SENSORINEURAL HEARING LOSS, BILATERAL: Status: ACTIVE | Noted: 2024-03-28

## 2024-03-28 NOTE — PATIENT INSTRUCTIONS
Your ears were fully cleaned of cerumen.  Your audiogram shows a bilateral mild to moderate hearing loss which is symmetric.  We compared this to your audiogram done on 8/16/2018 and it is about 10 dB source over that time.  For the tinnitus, you can reduce your sodium and caffeine.  You could also try Lipoflavonoid.  Follow-up in 1 year's time, sooner if problems.

## 2024-03-28 NOTE — PROGRESS NOTES
Eda Theodore is a 68 year old female.   Chief Complaint   Patient presents with    Ringing In Ear     Worsening tinnitus of left ear and wax build-up  Pt wants to discuss possible link between tinnitus and medication     HPI:   Patient here with worsening in her left tinnitus.    Current Outpatient Medications   Medication Sig Dispense Refill    sertraline 50 MG Oral Tab Take 1 tablet (50 mg total) by mouth daily. 90 tablet 3    NON FORMULARY 2 scoops of collagen powder a day      Vitamin D3 50 MCG (2000 UT) Oral Cap Take 50 mcg by mouth daily.      Probiotic Product (ALIGN) Oral Cap Take  by mouth.      MULTI-VITAMIN/MINERALS OR TABS 1 TABLET DAILY        Past Medical History:   Diagnosis Date    Alopecia areata     HAS HAD INJECTIONS OF STEROIDS INTO THE SCALP WITH DERM-DR NATHALIE DUTTON    Breast CA ([facility])     Cancer of overlapping sites of left female breast ([facility]) 09/04/1987    STAGE 3; CAF 6 CYCLES ; 12+ NODES; 30 XRT TX     Dense breast tissue on mammogram 11/05/2021    Depressive disorder, not elsewhere classified     ON [place]OLOFT    Disorder of bone and cartilage, unspecified 2005    OSTEOPENIA; DEXA 4/4/08-NORMAL    Displacement of lumbar intervertebral disc without myelopathy     L4-5    Hypertonicity of bladder     MALIGN NEOPL BREAST NOS ([facility]) 11/27/2007    Malignant neoplasm of breast (female), unspecified site 09/04/1987    STAGE 3; CAF 6 CYCLES ; 12+ NODES; 30 XRT TX    OSTEOPENIA 7/2010 borderline osteopenia (stopped Fosamax at this time)    Osteopenia of multiple sites 11/05/2016    Osteopenia of neck of left femur 11/05/2016    Osteoporosis     OTHER DISEASES DEXA 2008 (normal)    Personal history of antineoplastic chemotherapy     PONV (postoperative nausea and vomiting)     Unspecified vitamin D deficiency 04/16/2009    Vitamin D deficiency 04/16/2009      Social History:  Social History     Socioeconomic History    Marital status:    Occupational History     [place]   Tobacco Use    Smoking status: Never    Smokeless tobacco: Never   Vaping Use    Vaping Use: Never used   Substance and Sexual Activity    Alcohol use: Not Currently     Comment: ONE PER MTH    Drug use: No   Other Topics Concern    Reaction to local anesthetic No    Pt has a pacemaker No    Pt has a defibrillator No   Social History Narrative     X 28YRS    NOT CURRENTLY DATING        REVIEW OF SYSTEMS:   GENERAL HEALTH: feels well otherwise  GENERAL : denies fever, chills, sweats, weight loss, weight gain  SKIN: denies any unusual skin lesions or rashes  RESPIRATORY: denies shortness of breath with exertion  NEURO: denies headaches    EXAM:   Ht 5' 7.5\" (1.715 m)   Wt 124 lb (56.2 kg)   BMI 19.13 kg/m²   System Details   Skin Inspection - Normal.   Constitutional Overall appearance - Normal.   Head/Face Facial features - Normal. Eyebrows - Normal. Skull - Normal.   Eyes Conjunctiva - Right: Normal, Left: Normal. Pupil - Right: Normal, Left: Normal.    Ears Inspection - Right: Normal, Left: Normal.   Ears = bilateral cerumen occlussions.    Fully cleaned under microscope using instrumentation and suctioning.    Normal tympanic membranes.     Nasal External nose - Normal.      Oral/Oropharynx Lips - Normal   Neck Exam Inspection - Normal. Palpation - Normal. Parotid gland - Normal. Thyroid gland - Normal.  No carotid bruits by auscultation   Lymph Detail Submental. Submandibular. Anterior cervical. Posterior cervical. Supraclavicular all without enlargement   Psychiatric Orientation - Oriented to time, place, person & situation. Appropriate mood and affect.   Neurological Memory - Normal. Cranial nerves - Cranial nerves II through XII grossly intact.     ASSESSMENT AND PLAN:   1. Left-sided tinnitus  Audiogram reviewed with patient and she was given a copy at the date of her visit.  For the tinnitus can reduce sodium and caffeine.  - Audiology Referral - [place] ([facility])    2. Bilateral impacted cerumen  Fully cleaned.  Follow-up in 1 year's time, sooner if problems.      3. Bilateral sensorineural hearing loss  Audiogram shows bilateral mild to moderate sensorineural hearing loss which is symmetric.  Word discrimination score is excellent at 100% bilaterally.  This was reviewed and compared to the audiogram done in August 2018 and there has been about a 10 dB worsening of the hearing.      The patient indicates understanding of these issues and agrees to the plan.      Gi Brown MD  3/27/2024  9:07 PM

## 2024-04-30 ENCOUNTER — OFFICE VISIT (OUTPATIENT)
Dept: INTERNAL MEDICINE CLINIC | Facility: CLINIC | Age: 69
End: 2024-04-30
Payer: MEDICARE

## 2024-04-30 VITALS
HEIGHT: 67.5 IN | WEIGHT: 125 LBS | DIASTOLIC BLOOD PRESSURE: 56 MMHG | SYSTOLIC BLOOD PRESSURE: 110 MMHG | BODY MASS INDEX: 19.39 KG/M2 | HEART RATE: 63 BPM

## 2024-04-30 DIAGNOSIS — N30.01 ACUTE CYSTITIS WITH HEMATURIA: Primary | ICD-10-CM

## 2024-04-30 LAB
APPEARANCE: CLEAR
BILIRUBIN: NEGATIVE
GLUCOSE (URINE DIPSTICK): NEGATIVE MG/DL
KETONES (URINE DIPSTICK): NEGATIVE MG/DL
MULTISTIX LOT#: ABNORMAL NUMERIC
NITRITE, URINE: NEGATIVE
PH, URINE: 7 (ref 4.5–8)
PROTEIN (URINE DIPSTICK): NEGATIVE MG/DL
SPECIFIC GRAVITY: 1.01 (ref 1–1.03)
URINE-COLOR: YELLOW
UROBILINOGEN,SEMI-QN: 0.2 MG/DL (ref 0–1.9)

## 2024-04-30 PROCEDURE — 99213 OFFICE O/P EST LOW 20 MIN: CPT | Performed by: NURSE PRACTITIONER

## 2024-04-30 PROCEDURE — 81003 URINALYSIS AUTO W/O SCOPE: CPT | Performed by: NURSE PRACTITIONER

## 2024-04-30 RX ORDER — NITROFURANTOIN 25; 75 MG/1; MG/1
100 CAPSULE ORAL 2 TIMES DAILY
Qty: 14 CAPSULE | Refills: 0 | Status: SHIPPED | OUTPATIENT
Start: 2024-04-30

## 2024-04-30 NOTE — PROGRESS NOTES
Eda Theodore is a 68 year old female.  HPI:   Pt reports UTI sx that started 2-3 days ago. She took OTC urine test and it was positive for leukocytes only. She reports at end of urination she feels discomfort. She denies any urgency or frequency. She denies recurrent UTI's, reports she had a UTI 9/2023 and 9/2022. Denies any abdominal/flank pain, fever, chills, dizziness, NVDC, blood in urine. Sexually active, no concerns.   Current Outpatient Medications   Medication Sig Dispense Refill    MAGNESIUM GLYCINATE OR Take 200 mg by mouth daily.      nitrofurantoin monohydrate macro 100 MG Oral Cap Take 1 capsule (100 mg total) by mouth 2 (two) times daily. 14 capsule 0    sertraline 50 MG Oral Tab Take 1 tablet (50 mg total) by mouth daily. 90 tablet 3    NON FORMULARY 2 scoops of collagen powder a day      Vitamin D3 50 MCG (2000 UT) Oral Cap Take 50 mcg by mouth daily.      Probiotic Product (ALIGN) Oral Cap Take  by mouth.      MULTI-VITAMIN/MINERALS OR TABS 1 TABLET DAILY        Past Medical History:    Alopecia areata    HAS HAD INJECTIONS OF STEROIDS INTO THE SCALP WITH DERM-DR NATHALIE DUTTON    Breast CA (HCC)    Cancer of overlapping sites of left female breast (HCC)    STAGE 3; CAF 6 CYCLES ; 12+ NODES; 30 XRT TX     Dense breast tissue on mammogram    Depressive disorder, not elsewhere classified    ON ZOLOFT    Disorder of bone and cartilage, unspecified    OSTEOPENIA; DEXA 4/4/08-NORMAL    Displacement of lumbar intervertebral disc without myelopathy    L4-5    Hypertonicity of bladder    MALIGN NEOPL BREAST NOS (HCC)    Malignant neoplasm of breast (female), unspecified site    STAGE 3; CAF 6 CYCLES ; 12+ NODES; 30 XRT TX    OSTEOPENIA    Osteopenia of multiple sites    Osteopenia of neck of left femur    Osteoporosis    OTHER DISEASES    Personal history of antineoplastic chemotherapy    PONV (postoperative nausea and vomiting)    Unspecified vitamin D deficiency    Vitamin D deficiency      Social  History:  Social History     Socioeconomic History    Marital status:    Occupational History     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   Tobacco Use    Smoking status: Never    Smokeless tobacco: Never   Vaping Use    Vaping status: Never Used   Substance and Sexual Activity    Alcohol use: Not Currently     Comment: ONE PER MTH    Drug use: No   Other Topics Concern    Reaction to local anesthetic No    Pt has a pacemaker No    Pt has a defibrillator No   Social History Narrative     X 28YRS    NOT CURRENTLY DATING        REVIEW OF SYSTEMS:   Review of Systems   All other systems reviewed and are negative.         EXAM:   /56 (BP Location: Right arm, Patient Position: Sitting, Cuff Size: adult)   Pulse 63   Ht 5' 7.5\" (1.715 m)   Wt 125 lb (56.7 kg)   BMI 19.29 kg/m²     Physical Exam  Vitals reviewed.   Constitutional:       General: She is not in acute distress.  HENT:      Head: Normocephalic.   Eyes:      Conjunctiva/sclera: Conjunctivae normal.      Pupils: Pupils are equal, round, and reactive to light.   Abdominal:      General: Abdomen is flat. Bowel sounds are normal. There is no distension.      Palpations: Abdomen is soft. There is no mass.      Tenderness: There is no abdominal tenderness. There is no right CVA tenderness, left CVA tenderness, guarding or rebound.      Hernia: No hernia is present.   Skin:     General: Skin is warm.      Coloration: Skin is not jaundiced.   Neurological:      Mental Status: She is alert and oriented to person, place, and time.   Psychiatric:         Mood and Affect: Mood normal.         Judgment: Judgment normal.            ASSESSMENT AND PLAN:   1. Acute cystitis with hematuria  -UC sent  -Start macrobid, reviewed dose and s/e. Cranberry pills PRN OTC. Hydrate.   - Urinalysis, Routine [E]; Future  - Urine Culture, Routine [E]; Future  - nitrofurantoin monohydrate macro 100 MG Oral Cap; Take 1 capsule (100 mg total) by mouth 2 (two)  times daily.  Dispense: 14 capsule; Refill: 0  - Urinalysis, Routine [E]  - Urine Culture, Routine [E]  - POC Urinalysis, Automated Dip without microscopy (PCA and EMMG ONLY) [20908]       The patient indicates understanding of these issues and agrees to the plan.  The patient is asked to return in PRN.     The above note was creating using Dragon speech recognition technology. Please excuse any typos.

## 2024-05-01 LAB
BILIRUB UR QL: NEGATIVE
COLOR UR: YELLOW
GLUCOSE UR-MCNC: NORMAL MG/DL
KETONES UR-MCNC: NEGATIVE MG/DL
LEUKOCYTE ESTERASE UR QL STRIP.AUTO: 500
PH UR: 6.5 [PH] (ref 5–8)
PROT UR-MCNC: NEGATIVE MG/DL
RBC #/AREA URNS AUTO: >10 /HPF
SP GR UR STRIP: 1.01 (ref 1–1.03)
UROBILINOGEN UR STRIP-ACNC: NORMAL
WBC CLUMPS UR QL AUTO: PRESENT /HPF

## 2024-05-16 ENCOUNTER — OFFICE VISIT (OUTPATIENT)
Dept: HEMATOLOGY/ONCOLOGY | Facility: HOSPITAL | Age: 69
End: 2024-05-16
Attending: INTERNAL MEDICINE

## 2024-05-16 VITALS
HEIGHT: 67.52 IN | BODY MASS INDEX: 19.35 KG/M2 | OXYGEN SATURATION: 98 % | HEART RATE: 61 BPM | DIASTOLIC BLOOD PRESSURE: 54 MMHG | SYSTOLIC BLOOD PRESSURE: 117 MMHG | TEMPERATURE: 98 F | WEIGHT: 126.19 LBS | RESPIRATION RATE: 16 BRPM

## 2024-05-16 DIAGNOSIS — Z83.2 FAMILY HISTORY OF PROTEIN C DEFICIENCY: Primary | ICD-10-CM

## 2024-05-16 PROCEDURE — 99213 OFFICE O/P EST LOW 20 MIN: CPT | Performed by: INTERNAL MEDICINE

## 2024-05-16 NOTE — PROGRESS NOTES
HPI   Eda Theodore is a 68 year old female here for follow up of   Encounter Diagnosis   Name Primary?    Family history of protein C deficiency Yes     She has follow up with PCP in February of 2024 for yearly exam.    She denies changes on the R breast or L reconstruction.  No lymphedema.  No issues with the implant.     She is here due to concern for a clotting disorder as it runs in the family.  She states that she has mild protein C deficiency.  The patient states she has a cyst on the L leg and would like a cyst removed.  She is wondering if she should be tested and if needs prophylaxis for surgery of the cyst removal.    States had positive LAURA due to w/u for Raynaud's disease.  Subtype titers negative.     She also has been having UTIs and states was told to ask about topical vaginal estrogen cream.      ECOG PS 0      Oncology History   History of left breast cancer   8/28/1987 Surgery    Lumpectomy measured 3.5 x 2.7 x 2.1 cm.  ER 46, .     9/4/1987 Surgery    Stage III infiltrating ductal carcinoma of the left breast status post mastectomy.  Six of fifteen  nodes were involved and the primary lesion measured 5 cm with multiple secondary tumor masses present.     9/4/1987 Cancer Staged    pT3 N2 M0 - Stage IIIA     9/18/1987 -  Chemotherapy    Cytoxan 700 mg + Adriamycin 70 mg + 5FU 700 mg x 5 cycles     1988 -  Radiation    Radiation therapy was given by Dr. Woodall through the [facility].      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 Two mutations within the gene, on inherited from each parent, are required for and increase in cancer risk. There are no known cancer risks  associated with carrying a single gene mutation.            Review of Systems:   Review of Systems   Constitutional:  Negative for appetite change, fatigue and unexpected weight change.   Respiratory:  Negative for cough and shortness of breath.    Cardiovascular:  Negative for chest pain.   Gastrointestinal:  Negative for abdominal pain.   Musculoskeletal:  Positive for back pain (chronic, but does yoga and exercises) and neck pain (chronic). Negative for arthralgias.        No bone pain.   Neurological:  Negative for dizziness and headaches.   Hematological:  Negative for adenopathy.   Psychiatric/Behavioral:  Negative for depression and sleep disturbance. The patient is not nervous/anxious.             Current Outpatient Medications   Medication Sig Dispense Refill    MAGNESIUM GLYCINATE OR Take 200 mg by mouth daily.      nitrofurantoin monohydrate macro 100 MG Oral Cap Take 1 capsule (100 mg total) by mouth 2 (two) times daily. 14 capsule 0    sertraline 50 MG Oral Tab Take 1 tablet (50 mg total) by mouth daily. 90 tablet 3    NON FORMULARY 2 scoops of collagen powder a day      Vitamin D3 50 MCG (2000 UT) Oral Cap Take 50 mcg by mouth daily.      Probiotic Product (ALIGN) Oral Cap Take  by mouth.      MULTI-VITAMIN/MINERALS OR TABS 1 TABLET DAILY       Allergies:   Allergies   Allergen Reactions    Adhesive Tape HIVES    Penicillins HIVES       Past Medical History:    Alopecia areata    HAS HAD INJECTIONS OF STEROIDS INTO THE SCALP WITH DERM-DR NATHALIE DUTTON    Breast CA (HCC)    Cancer of overlapping sites of left female breast (HCC)    STAGE 3; CAF 6 CYCLES ; 12+ NODES; 30 XRT TX     Dense breast tissue on mammogram    Depressive disorder, not elsewhere classified    ON ZOLOFT    Disorder of bone and cartilage, unspecified    OSTEOPENIA; DEXA 4/4/08-NORMAL    Displacement of lumbar intervertebral disc without myelopathy    L4-5    Hypertonicity of bladder    MALIGN NEOPL BREAST NOS (HCC)    Malignant  neoplasm of breast (female), unspecified site    STAGE 3; CAF 6 CYCLES ; 12+ NODES; 30 XRT TX    OSTEOPENIA    Osteopenia of multiple sites    Osteopenia of neck of left femur    Osteoporosis    OTHER DISEASES    Personal history of antineoplastic chemotherapy    PONV (postoperative nausea and vomiting)    Unspecified vitamin D deficiency    Vitamin D deficiency     Past Surgical History:   Procedure Laterality Date    Breast surgery procedure unlisted  1987    mastectomy    Chemotherapy  1987    Colonoscopy  3/2013    hemorrhoids. repeat 5 yrs, 2 day prep    Colonoscopy N/A 1/23/2024    Procedure: COLONOSCOPY;  Surgeon: David Mcmillan MD;  Location: [facility]    Colonoscopy,diagnostic  11/21/03    INTERNAL HEMORRHOIDS ; DUE FOR REPEAT IN 8-10 YRS WITH DR EPSTEIN    Colonoscopy,diagnostic  3/1/2013    Procedure: COLONOSCOPY, POSSIBLE BIOPSY, POSSIBLE POLYPECTOMY 16133;  Surgeon: Nash Soler MD;  [facility]: [facility]    Hysterectomy      and USO    Mastectomy left  09/1987    Oophorectomy      per NG: x1    Radiation left Left 1988    Reduction right  1988    Special service or report      B/L BUNIONECTOMIES    Special service or report  AGE 31    Left MRM FOR STAGE 3 BREAST CA    Special service or report  AGE 32    LEFT BREAST RECONSTRUCTION WITH SALINE IMPLANT    Special service or report  AGE 32    RIGHT BREAST REDUCTION    Vaginal hysterectomy      WITH ONE SO FOR CYST     Social History     Socioeconomic History    Marital status:    Occupational History     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   Tobacco Use    Smoking status: Never    Smokeless tobacco: Never   Vaping Use    Vaping status: Never Used   Substance and Sexual Activity    Alcohol use: Not Currently     Comment: ONE PER MTH    Drug use: No   Other Topics Concern    Reaction to local anesthetic No    Pt has a pacemaker No    Pt has a defibrillator No   Social History Narrative     X 28YRS    NOT  CURRENTLY DATING       Family History   Problem Relation Age of Onset    Cancer Father         CLL; BCCA;SQCCA; PAROTID TUMOR; prostate ca, BCC, SCC    Heart Disorder Father         ARRHYTHMIA ON COUMADIN; AFIB    Ear Problems Father         per NG: hearing defeciency    Other (Other) Father     Hypertension Mother     Arthritis Mother         per NG: rheumatoid arthritis    Dementia Mother         lewey body dementia    Other (Other) Mother         RA;TB;PE &DVT    Breast Cancer Daughter 39        MyRisk negative (2016)    Cancer Maternal Grandmother 65        COLON    Stroke Maternal Grandfather     Other (Other) Maternal Grandfather         CVA    Cancer Paternal Grandmother         LUNG     Other (Other) Sister         OSTEOPENIA    Cancer Maternal Aunt 50        uterine ca    Cancer Maternal Aunt         thyroid/renal ca (non-smoker)    Breast Cancer Self 31       PHYSICAL EXAM:    /54 (BP Location: Left arm, Patient Position: Sitting, Cuff Size: adult)   Pulse 61   Temp 97.6 °F (36.4 °C) (Oral)   Resp 16   Ht 1.715 m (5' 7.52\")   Wt 57.2 kg (126 lb 3.2 oz)   SpO2 98%   BMI 19.46 kg/m²   Wt Readings from Last 6 Encounters:   05/16/24 57.2 kg (126 lb 3.2 oz)   04/30/24 56.7 kg (125 lb)   03/27/24 56.2 kg (124 lb)   02/14/24 56.2 kg (124 lb)   01/16/24 55.8 kg (123 lb)   11/16/23 55.8 kg (123 lb)     Physical Exam  General: Patient is alert, not in acute distress.  HEENT: EOMs intact. PERRL.   Neck: No JVD. No palpable lymphadenopathy. Neck is supple.  Chest: Clear to auscultation.  Breasts: L mastectomy with reconstruction lateral portion with RT related changes.  R breast no masses.   Heart: Regular rate and rhythm.   Abdomen: Soft, non tender with good bowel sounds.  Extremities: No edema.  Neurological: Grossly intact.   Lymphatics: There is no palpable lymphadenopathy throughout in the cervical, supraclavicular, axillary, or inguinal regions.  Psych/Depression: nl        ASSESSMENT/PLAN:     1.  Family history of protein C deficiency      Discussed with the patient that for a family history of protein C deficiency, she can have protein C testing to determine her risk.  However at her age, she has never had a thrombotic event even despite having had surgeries.    Regarding an excision of a cyst in her lower extremity, discussed with the patient that the risk of having a thrombotic event is very low, this is an ambulatory surgery.  There is no special precaution for decreasing risk of clots.      Patient asked about use of vaginal estrogens to decrease risk of UTIs.  D/w the patient that since her breast cancer was > 36 yrs ago, she may proceed with the vaginal estrogen cream per PCP.                  Recommended rheumatology w/u for elevated LAURA.      History of left breast cancer.  Patient > 36 yrs post dx as above.    She continues to do well.      She is due for yearly mammogram in November of 2024, as the one on November 2023 was benign.     Continue with monthly SBE.    Clinical SBE and yearly exam.          MDM low.       Orders Placed This Encounter   Procedures    PROTEIN C DEFICIENCY PROFILE [E]       Results From Past 48 Hours:  No results found for this or any previous visit (from the past 48 hour(s)).    Imaging & Referrals:  None   No orders of the defined types were placed in this encounter.

## 2024-05-22 ENCOUNTER — PATIENT MESSAGE (OUTPATIENT)
Dept: INTERNAL MEDICINE CLINIC | Facility: CLINIC | Age: 69
End: 2024-05-22

## 2024-05-22 NOTE — TELEPHONE ENCOUNTER
From: Eda Theodore  To: Sunrhonda Silva  Sent: 5/22/2024 9:58 AM CDT  Subject: New Prescription     Good morning—  During my May 16th appt with Dr. Edmonds we discussed Dr. Brown’s recent suggestion that I use topical estrogen as a preventative measure against UTIs along with other potential benefits. Dr. Edmonds agreed it would be safe and helpful in my case and said she would send a message to you with her approval for use, explaining you would need to be the prescribing physician. Checking now to learn the status on this prescription.   Please advise.   Many thanks,  Eda Theodore

## 2024-05-31 NOTE — TELEPHONE ENCOUNTER
Per med list , please verify quantity , 1 each? Medication direction doesn't much the quantity?   pls advise, thanks in advance.         conjugated estrogens 0.625 MG/GM Vaginal Cream 1 each 0 5/30/2024 --   Sig:   Place 0.5 g vaginally twice a week.     Route:   Vaginal

## 2024-05-31 NOTE — TELEPHONE ENCOUNTER
Routed to Dr Silva for advise, thanks.  Patient request for alternative.   Also [facility] message with sent to patient.

## 2024-07-02 DIAGNOSIS — F32.A MILD DEPRESSION: ICD-10-CM

## 2024-07-04 NOTE — TELEPHONE ENCOUNTER
Refill Per Protocol     Requested Prescriptions   Pending Prescriptions Disp Refills    SERTRALINE 50 MG Oral Tab [Pharmacy Med Name: Sertraline Hydrochloride 50 Mg Tab Nort] 90 tablet 0     Sig: TAKE ONE TABLET BY MOUTH ONE TIME DAILY       Psychiatric Non-Scheduled (Anti-Anxiety) Passed - 7/2/2024 12:12 PM        Passed - In person appointment or virtual visit in the past 6 mos or appointment in next 3 mos     Recent Outpatient Visits              1 month ago Family history of protein C deficiency    [facility] Hematology Oncology Soheila Edmonds MD    Office Visit    2 months ago Acute cystitis with hematuria    [facility], [address], [place] Chani Navarrete APRN    Office Visit    3 months ago Sensorineural hearing loss, bilateral    [facility], Regina White Au.D    Office Visit    3 months ago Left-sided tinnitus    [facility], Gi Poe MD    Office Visit    4 months ago Medicare annual wellness visit, subsequent    [facility], [address], [place] Ruby Silva MD    Office Visit          Future Appointments         Provider Department Appt Notes    In 4 months Soheila Edmonds MD [facility] Hematology Oncology follow up visit.cl  1y                    Passed - Depression Screening completed within the past 12 months               Future Appointments         Provider Department Appt Notes    In 4 months Soheila Edmonds MD [facility] Hematology Oncology follow up visit.cl  1y          Recent Outpatient Visits              1 month ago Family history of protein C deficiency    [facility] Hematology Oncology Soheila Edmonds MD    Office Visit    2 months ago Acute cystitis with hematuria    [facility], [address], [place] Chani Navarrete APRN    Office Visit    3 months ago Sensorineural hearing loss, bilateral    [facility]  [facility], Regina White Au.D    Office Visit    3 months ago Left-sided tinnitus    [facility], Gi Poe MD    Office Visit    4 months ago Medicare annual wellness visit, subsequent    [facility], [place] Vukomanovic, Emela, MD    Office Visit

## 2024-08-22 ENCOUNTER — TELEPHONE (OUTPATIENT)
Dept: INTERNAL MEDICINE CLINIC | Facility: CLINIC | Age: 69
End: 2024-08-22

## 2024-08-22 NOTE — TELEPHONE ENCOUNTER
Patient called back stating she received letter stating she was due for labs, however, did not recall having to repeat any.     Last set of lab results state:    Dear Eda,     Your blood test results are showing     Kidney function is normal  Liver function is normal  No anemia.  Cholesterol is mildly elevated -keep with low saturated fat diet, less red meat ,fried food  Sugar is normal.  Thyroid hormone is within normal limits as well.     Vitamin D is in normal range   vitamin B12 is Mildly elevated -if taking any supplements can cut down supplements to possibly every other day       I recommend you to continue present management and have follow up visit within 1 month or sooner if there are any symptoms or concerns.     Best Regards,  VIANEY Silva M.D.    Does patient need to repeat lab orders placed on 2/16 or need an appointment? Please advise

## 2024-08-26 NOTE — TELEPHONE ENCOUNTER
No pap result on file.     Protocol Failed/ No Protocol    Requested Prescriptions   Pending Prescriptions Disp Refills    ESTRADIOL 0.1 MG/GM Vaginal Cream [Pharmacy Med Name: Estradiol 0.1 Mg/Gm Cre Pada] 42.5 g 0     Sig: Place 1 gram vaginally once daily for 1 week, then  place 1 gram vaginally twice per week as needed       Gynecology Medication Protocol Failed - 8/26/2024 12:53 PM        Failed - PASS--PENDING LAST PAP WNL--VIA MANUAL LOOKUP        Passed - Mammogram in past 12 months        Passed - Physical or Pelvic/Breast in past 12 or next 3 mos--VIA MANUAL LOOKUP             Future Appointments         Provider Department Appt Notes    In 2 months Soheila Edmonds MD [facility] Hematology Oncology follow up visit.cl  1y          Recent Outpatient Visits              3 months ago Family history of protein C deficiency    [facility] Hematology Oncology Soheila Edmonds MD    Office Visit    3 months ago [facility]    Office Visit    5 months ago Sensorineural hearing loss, bilateral    [facility], Regina White Au.D    Office Visit    5 months ago Left-sided tinnitus    [facility], Gi Poe MD    Office Visit    6 months ago Medicare annual wellness visit, subsequent    [facility], [place] Vukomanovic, Emela, MD    Office Visit

## 2024-08-27 RX ORDER — ESTRADIOL 0.1 MG/G
CREAM VAGINAL
Qty: 42.5 G | Refills: 3 | Status: SHIPPED | OUTPATIENT
Start: 2024-08-27

## 2024-08-27 NOTE — TELEPHONE ENCOUNTER
Please advise patient  to keep appt with PCP to discuss. From chart review, those labs need to be completed. She can discuss with PCP if she has further questions. Thank you!

## 2024-08-27 NOTE — TELEPHONE ENCOUNTER
Looks like pt needs to complete labs from 02/2024 and schedule an in person appt with her PCP. Thank you

## 2024-08-28 NOTE — TELEPHONE ENCOUNTER
Patient was informed of below and patient wants to confirm that the pended labs do need to get done and that the provider wants to see patient and this will be covered by her insurance the appointment and labs.  She wants the provider to confirm this not the APN.

## 2024-08-29 NOTE — TELEPHONE ENCOUNTER
Patient states [facility] did not get the prescription. Spoke to [facility] and they received the script and will refill today Patient notified

## 2024-09-03 NOTE — TELEPHONE ENCOUNTER
Patient contacted and made aware of Dr. Silva's recommendations. She is electing to defer scheduling visit until her Medicare Annual visit as she is being followed by Dr. Edmonds for Protein C deficiency and does not think there is a need for sooner visit at this time. She will call us sooner if needed. Medicare Annual visit was scheduled. Patient verbalized understanding. No further questions or concerns at this time.    Future Appointments   Date Time Provider Department Center   11/18/2024 11:30 AM Soheila Edmonds MD [facility] HEM ONC [facility]   2/18/2025 10:00 AM Ruby Silva MD [id number] [place]

## 2024-09-03 NOTE — TELEPHONE ENCOUNTER
Pt  does not need labs at this time .  Can have f/u visit in office in few   months  will discuss need for labs at that time.

## 2024-11-12 ENCOUNTER — HOSPITAL ENCOUNTER (OUTPATIENT)
Dept: MAMMOGRAPHY | Age: 69
Discharge: HOME | End: 2024-11-12
Attending: INTERNAL MEDICINE
Payer: MEDICARE

## 2024-11-12 ENCOUNTER — LAB ENCOUNTER (OUTPATIENT)
Dept: LAB | Age: 69
End: 2024-11-12
Attending: INTERNAL MEDICINE
Payer: MEDICARE

## 2024-11-12 DIAGNOSIS — Z90.12 H/O MASTECTOMY, LEFT: ICD-10-CM

## 2024-11-12 DIAGNOSIS — Z83.2 FAMILY HISTORY OF PROTEIN C DEFICIENCY: ICD-10-CM

## 2024-11-12 DIAGNOSIS — Z12.31 SCREENING MAMMOGRAM, ENCOUNTER FOR: ICD-10-CM

## 2024-11-12 PROCEDURE — 85303 CLOT INHIBIT PROT C ACTIVITY: CPT

## 2024-11-12 PROCEDURE — 77067 SCR MAMMO BI INCL CAD: CPT | Performed by: INTERNAL MEDICINE

## 2024-11-12 PROCEDURE — 36415 COLL VENOUS BLD VENIPUNCTURE: CPT

## 2024-11-12 PROCEDURE — 77063 BREAST TOMOSYNTHESIS BI: CPT | Performed by: INTERNAL MEDICINE

## 2024-11-14 LAB — PROTEIN C FUNCTIONAL: 157 %

## 2024-11-18 ENCOUNTER — OFFICE VISIT (OUTPATIENT)
Dept: HEMATOLOGY/ONCOLOGY | Facility: HOSPITAL | Age: 69
End: 2024-11-18
Attending: INTERNAL MEDICINE
Payer: MEDICARE

## 2024-11-18 VITALS
BODY MASS INDEX: 19.24 KG/M2 | TEMPERATURE: 98 F | HEIGHT: 67.5 IN | SYSTOLIC BLOOD PRESSURE: 121 MMHG | WEIGHT: 124 LBS | DIASTOLIC BLOOD PRESSURE: 31 MMHG | RESPIRATION RATE: 16 BRPM | OXYGEN SATURATION: 98 % | HEART RATE: 66 BPM

## 2024-11-18 DIAGNOSIS — Z85.3 ENCOUNTER FOR FOLLOW-UP SURVEILLANCE OF BREAST CANCER: ICD-10-CM

## 2024-11-18 DIAGNOSIS — Z08 ENCOUNTER FOR FOLLOW-UP SURVEILLANCE OF BREAST CANCER: ICD-10-CM

## 2024-11-18 DIAGNOSIS — Z90.12 H/O MASTECTOMY, LEFT: ICD-10-CM

## 2024-11-18 DIAGNOSIS — Z12.31 SCREENING MAMMOGRAM, ENCOUNTER FOR: ICD-10-CM

## 2024-11-18 DIAGNOSIS — Z85.3 HISTORY OF LEFT BREAST CANCER: Primary | ICD-10-CM

## 2024-11-18 PROCEDURE — 99211 OFF/OP EST MAY X REQ PHY/QHP: CPT

## 2024-11-18 NOTE — PROGRESS NOTES
HPI   Eda Theodore is a 69 year old female here for follow up of   Encounter Diagnoses   Name Primary?    History of left breast cancer Yes    Encounter for follow-up surveillance of breast cancer     Screening mammogram, encounter for     H/O mastectomy, left        She denies changes on the R breast or L reconstruction.      No lymphedema.  No issues with the implant.     On topical vaginal estrogen cream for prior UTIs.      ECOG PS 0      Oncology History   History of left breast cancer   8/28/1987 Surgery    Lumpectomy measured 3.5 x 2.7 x 2.1 cm.  ER 46, .     9/4/1987 Surgery    Stage III infiltrating ductal carcinoma of the left breast status post mastectomy.  Six of fifteen  nodes were involved and the primary lesion measured 5 cm with multiple secondary tumor masses present.     9/4/1987 Cancer Staged    pT3 N2 M0 - Stage IIIA     9/18/1987 -  Chemotherapy    Cytoxan 700 mg + Adriamycin 70 mg + 5FU 700 mg x 5 cycles     1988 -  Radiation    Radiation therapy was given by Dr. Woodall through the [facility].      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 Two mutations within the gene, on inherited from each parent, are required for and increase in cancer risk. There are no known cancer risks associated with carrying a single gene mutation.            Review of Systems:   Review of Systems   Constitutional:  Negative for appetite change, fatigue and unexpected weight change.   Respiratory:  Negative for cough and shortness of breath.    Cardiovascular:  Negative for chest pain.   Gastrointestinal:  Negative for abdominal pain.   Genitourinary:          Urinary urgency    Musculoskeletal:  Positive for back pain (chronic, but does yoga and exercises) and neck pain (chronic). Negative for arthralgias.        No bone pain.   Neurological:  Negative for dizziness and headaches.   Hematological:  Negative for adenopathy.   Psychiatric/Behavioral:  Negative for depression and sleep disturbance. The patient is not nervous/anxious.             Current Outpatient Medications   Medication Sig Dispense Refill    estradiol 0.1 MG/GM Vaginal Cream Place 1 g vaginally daily. -for 1 week , than twice per week as needed 42.5 g 3    sertraline 50 MG Oral Tab Take 1 tablet (50 mg total) by mouth daily. 90 tablet 3    MAGNESIUM GLYCINATE OR Take 200 mg by mouth daily.      NON FORMULARY 2 scoops of collagen powder a day      Vitamin D3 50 MCG (2000 UT) Oral Cap Take 50 mcg by mouth daily.      Probiotic Product (ALIGN) Oral Cap Take  by mouth.      MULTI-VITAMIN/MINERALS OR TABS 1 TABLET DAILY       Allergies:   Allergies   Allergen Reactions    Adhesive Tape HIVES    Penicillins HIVES       Past Medical History:    Alopecia areata    HAS HAD INJECTIONS OF STEROIDS INTO THE SCALP WITH DERM-DR NATHALIE DUTTON    Breast CA (HCC)    Cancer of overlapping sites of left female breast (HCC)    STAGE 3; CAF 6 CYCLES ; 12+ NODES; 30 XRT TX     Dense breast tissue on mammogram    Depressive disorder, not elsewhere classified    ON ZOLOFT    Disorder of bone and cartilage, unspecified    OSTEOPENIA; DEXA 4/4/08-NORMAL    Displacement of lumbar intervertebral disc without myelopathy    L4-5    Hypertonicity of bladder    MALIGN NEOPL BREAST NOS (HCC)    Malignant neoplasm of breast (female), unspecified site    STAGE 3; CAF 6 CYCLES ; 12+ NODES; 30 XRT TX    OSTEOPENIA    Osteopenia of multiple sites    Osteopenia of neck of left femur    Osteoporosis    OTHER DISEASES    Personal history of antineoplastic chemotherapy    PONV (postoperative nausea and vomiting)    Unspecified vitamin D deficiency    Vitamin D  deficiency     Past Surgical History:   Procedure Laterality Date    Breast surgery procedure unlisted  1987    mastectomy    Chemotherapy  1987    Colonoscopy  3/2013    hemorrhoids. repeat 5 yrs, 2 day prep    Colonoscopy N/A 1/23/2024    Procedure: COLONOSCOPY;  Surgeon: David Mcmillan MD;  Location: [facility] ENDO    Colonoscopy,diagnostic  11/21/03    INTERNAL HEMORRHOIDS ; DUE FOR REPEAT IN 8-10 YRS WITH DR EPSTEIN    Colonoscopy,diagnostic  3/1/2013    Procedure: COLONOSCOPY, POSSIBLE BIOPSY, POSSIBLE POLYPECTOMY 50428;  Surgeon: Nash Soler MD;  Location: [facility], [facility]    Hysterectomy      and USO    Mastectomy left  09/1987    Oophorectomy      per NG: x1    Radiation left Left 1988    Reduction right  1988    Special service or report      B/L BUNIONECTOMIES    Special service or report  AGE 31    Left MRM FOR STAGE 3 BREAST CA    Special service or report  AGE 32    LEFT BREAST RECONSTRUCTION WITH SALINE IMPLANT    Special service or report  AGE 32    RIGHT BREAST REDUCTION    Vaginal hysterectomy      WITH ONE SO FOR CYST     Social History     Socioeconomic History    Marital status:    Occupational History     Comment: [place] - [employer] transportation   Tobacco Use    Smoking status: Never    Smokeless tobacco: Never   Vaping Use    Vaping status: Never Used   Substance and Sexual Activity    Alcohol use: Not Currently     Comment: ONE PER MTH    Drug use: No   Other Topics Concern    Reaction to local anesthetic No    Pt has a pacemaker No    Pt has a defibrillator No   Social History Narrative     X 28YRS    NOT CURRENTLY DATING       Family History   Problem Relation Age of Onset    Cancer Father         CLL; BCCA;SQCCA; PAROTID TUMOR; prostate ca, BCC, SCC    Heart Disorder Father         ARRHYTHMIA ON COUMADIN; AFIB    Ear Problems Father         per NG: hearing defeciency    Other (Other) Father     Hypertension Mother     Arthritis Mother         per NG:  rheumatoid arthritis    Dementia Mother         lewey body dementia    Other (Other) Mother         RA;TB;PE &DVT    Breast Cancer Daughter 39        MyRisk negative (2016)    Cancer Maternal Grandmother 65        COLON    Stroke Maternal Grandfather     Other (Other) Maternal Grandfather         CVA    Cancer Paternal Grandmother         LUNG     Other (Other) Sister         OSTEOPENIA    Cancer Maternal Aunt 50        uterine ca    Cancer Maternal Aunt         thyroid/renal ca (non-smoker)    Breast Cancer Self 31       PHYSICAL EXAM:    /31 (BP Location: Left arm, Patient Position: Sitting, Cuff Size: adult)   Pulse 66   Temp 97.9 °F (36.6 °C) (Oral)   Resp 16   Ht 1.715 m (5' 7.5\")   Wt 56.2 kg (124 lb)   SpO2 98%   BMI 19.13 kg/m²   Wt Readings from Last 6 Encounters:   11/18/24 56.2 kg (124 lb)   05/16/24 57.2 kg (126 lb 3.2 oz)   04/30/24 56.7 kg (125 lb)   03/27/24 56.2 kg (124 lb)   02/14/24 56.2 kg (124 lb)   01/16/24 55.8 kg (123 lb)     Physical Exam  General: Patient is alert, not in acute distress.  HEENT: EOMs intact. PERRL.   Neck: No JVD. No palpable lymphadenopathy. Neck is supple.  Chest: Clear to auscultation.  Breasts: L mastectomy with reconstruction lateral portion with RT related changes.  R breast no masses.   Heart: Regular rate and rhythm.   Abdomen: Soft, non tender with good bowel sounds.  Extremities: No edema.  Neurological: Grossly intact.   Lymphatics: There is no palpable lymphadenopathy throughout in the cervical, supraclavicular, axillary, or inguinal regions.  Psych/Depression: nl        ASSESSMENT/PLAN:     1. History of left breast cancer    2. Encounter for follow-up surveillance of breast cancer    3. Screening mammogram, encounter for    4. H/O mastectomy, left        History of left breast cancer.  Patient > 37 yrs post dx as above.    She continues to do well.      She is due for yearly mammogram in November of 2025, as the one on November 2024 was benign.      Continue with monthly SBE.    Clinical SBE and yearly exam.      MDM low.       No orders of the defined types were placed in this encounter.      Results From Past 48 Hours:  No results found for this or any previous visit (from the past 48 hours).    Imaging & Referrals:  None   No orders of the defined types were placed in this encounter.         Narrative  PROCEDURE: [facility] ROMAN 2D+3D SCREENING RIGHT ([id number])     COMPARISON: [facility] [place] [facility], [facility] ROMAN 2D+3D SCREENING RIGHT ([id number]), 10/31/2019, 9:45 AM.  [facility], [facility] ROMAN 2D+3D SCREENING RIGHT ([id number]), 11/01/2021, 10:18 AM.  [facility] [place] [facility], [facility] ROMAN 2D+3D SCREENING RIGHT ([id number]), 11/02/2022, 11:58 AM.  [facility], [facility] ROMAN 2D+3D SCREENING RIGHT ([id number]), 11/06/2023, 2:45 PM.     INDICATIONS: Z90.12 H/O mastectomy, left Z12.31 Screening mammogram, encounter for     TECHNIQUE: Full field direct screening mammography was performed and images were reviewed with the [employer]fection JESÚS 1.5.1.5 CAD device.  3D tomosynthesis was performed and reviewed        BREAST COMPOSITION:   Category c-Heterogeneously dense, which may obscure small masses.        FINDINGS: Patient is status post left mastectomy.  There is no suspicious asymmetry, mass, architectural distortion, or microcalcifications identified in the right breast.                   Impression  CONCLUSION:   There is no mammographic evidence of malignancy in the right breast. As long as patient's clinical breast exam remains unchanged, annual screening mammogram is recommended.     BI-RADS CATEGORY:    DIAGNOSTIC CATEGORY 2 - BENIGN FINDING:       RECOMMENDATIONS:  ROUTINE MAMMOGRAM AND CLINICAL EVALUATION IN 12 MONTHS.                   PLEASE NOTE: NORMAL MAMMOGRAM DOES NOT EXCLUDE THE POSSIBILITY OF BREAST CANCER.  A CLINICALLY SUSPICIOUS PALPABLE LUMP SHOULD BE BIOPSIED.        For patients over the age of 40, the target due date for the patient's next mammogram has been entered into a reminder system.       Patient received a discharge summary from the technologist after completion of exam.     Breast marker legend used on images    Triangle = Palpable lump  [place] = Skin tag or mole  BB = Nipple  Linear joseph = Scar  Square = Pain           Dictated by (CST): Connor Puentes DO on 11/13/2024 at 4:07 PM      Finalized by (CST): Connor Puentes DO on 11/13/2024 at 4:07 PM          [facility] [place] [facility] [address] [place] [address]  [phone]

## 2025-02-14 ENCOUNTER — TELEPHONE (OUTPATIENT)
Dept: INTERNAL MEDICINE CLINIC | Facility: CLINIC | Age: 70
End: 2025-02-14

## 2025-04-25 ENCOUNTER — OFFICE VISIT (OUTPATIENT)
Dept: UROLOGY | Facility: HOSPITAL | Age: 70
End: 2025-04-25
Attending: OBSTETRICS & GYNECOLOGY
Payer: MEDICARE

## 2025-04-25 VITALS — HEIGHT: 67.5 IN | RESPIRATION RATE: 16 BRPM | BODY MASS INDEX: 19.24 KG/M2 | WEIGHT: 124 LBS

## 2025-04-25 DIAGNOSIS — R35.1 NOCTURIA: ICD-10-CM

## 2025-04-25 DIAGNOSIS — N39.3 FEMALE STRESS INCONTINENCE: ICD-10-CM

## 2025-04-25 DIAGNOSIS — N81.84 PELVIC MUSCLE WASTING: ICD-10-CM

## 2025-04-25 DIAGNOSIS — N95.2 POSTMENOPAUSAL ATROPHIC VAGINITIS: ICD-10-CM

## 2025-04-25 DIAGNOSIS — N39.41 URGE INCONTINENCE: Primary | ICD-10-CM

## 2025-04-25 LAB
BLOOD URINE: NEGATIVE
CONTROL RUN WITHIN 24 HOURS?: YES
LEUKOCYTE ESTERASE URINE: NEGATIVE
NITRITE URINE: NEGATIVE

## 2025-04-25 PROCEDURE — 99212 OFFICE O/P EST SF 10 MIN: CPT

## 2025-04-25 PROCEDURE — 87086 URINE CULTURE/COLONY COUNT: CPT | Performed by: OBSTETRICS & GYNECOLOGY

## 2025-04-25 PROCEDURE — 81002 URINALYSIS NONAUTO W/O SCOPE: CPT | Performed by: OBSTETRICS & GYNECOLOGY

## 2025-04-25 NOTE — PROGRESS NOTES
Prabha Tavarez, DO  2025     Referred by Dr. Edmonds  Pt here with self    Chief Complaint   Patient presents with    Incontinence       HPI:  +KRYS (rare)  +UUI  Nocturia x1  Denies prolapse sx  Some sense of incomplete bladder emtpying  No dyspareunia  Reg bowels    PRIOR TREATMENTS:    Kegels  Vag estrogen per PCP recently  Has tried oxybutyinn (reported dry mouth)  Detrol ineffective  Vesicare (stopped given dementia risk)    Hyst USO for ca prevention    reports UTI q year  No gross hematuria    , vdx1    H/o breast ca (30 y/0) took tamoxifen x15yrs      Vitals:  Resp 16   Ht 67.5\"   Wt 124 lb (56.2 kg)   BMI 19.13 kg/m²      HISTORY:  Past Medical History[1]   Past Surgical History[2]   Family History[3]   Short Social Hx on File[4]     Allergies:  Allergies[5]    Medications:  Medications Prior to Visit[6]    Urogynecology Summary:  Urogynecology Summary  Prolapse: No  KRYS: Yes (very rare)  Urge Incontinence: Yes  Nocturia Frequency: 1  Frequency: 2 hours  Incomplete emptying: Yes  Constipation: Yes (rare -bowel regimen hand out reviewed with patient)  Wears pad day?:  (liner)  Wears Pad Night?: 0  Activities are limited by UI/POP?: No  Currently Sexually Active: Yes    Review of Systems:    A comprehensive 12 point review of systems was completed.  Pertinent positives noted in the the HPI.  No CP  No SOB    GENERAL EXAM:  GENERAL:  Alert and Oriented, and NAD  HEENT:  Normal, no lesions  LUNGS:  Normal effort  HEART:  RRR  ABDOMEN: soft, no mass, no hernia  EXTREM:  Normal, no edema  SKIN:  Normal, no lesions    PELVIC EXAM:  Ext. Gen: +atrophy, no lesions  Urethra: +atrophy, nontender  Bladder:+fullness, nontender  Vagina: +atrophy  Cervix & Uterus: absent  Adnexa:no masses, nontender  Perineum: nontender  Anus: +hemorrhoid  Rectum: defer    PELVIS FLOOR NEUROMUSCULAR FUNCTION:  Strength:  1 and Unable to hold greater than 3 sec  Perineal Sensation:  Normal      PELVIC SUPPORT:  Rosamond:  0  Ant:   0  Post:  0  CST:  negative  UVJ: somewhat hypermobile    Pt examined with chaperone, RN      Impression/Plan:    ICD-10-CM    1. Urge incontinence  N39.41 POCT urinalysis dipstick[50867]     Urine Culture, Routine     PHYSICAL THERAPY - External Location      2. Nocturia  R35.1 POCT urinalysis dipstick[49087]     Urine Culture, Routine     PHYSICAL THERAPY - External Location      3. Pelvic muscle wasting  N81.84 PHYSICAL THERAPY - External Location      4. Postmenopausal atrophic vaginitis  N95.2       5. Female stress incontinence  N39.3 POCT urinalysis dipstick[10620]     Urine Culture, Routine          Discussion Items:   Urodynamics and cystoscopy for evaluation of LUTS  Behavioral and pharmacologic treatments for OAB  Nonsurgical and surgical treatments for Stress Urinary Incontinence  Pelvic muscle rehabilitation including pelvic floor PT  Topical estrogen therapy for treating UGA  Bowel routine/constipation regimen  Discussed dietary and behavioral modification, discussed pharmacologic and nonpharmacologic mgmt options for urinary symptoms. Discussed dietary & weight management with potential improvements in symptoms with weight loss.    Bowel management reviewed. Discussed using fiber daily w/ addition of miralax as needed    Discussed dietary and behavioral modifications for mgmt of urinary symptoms  Discussed weight management and benefits of weight loss on urinary symptoms  Reviewed AUA/SUFU guidelines on mgmt of non-neurogenic OAB  Discussed pharmacologic and nonpharmacologic mgmt options of urinary symptoms - reviewed risks, benefits, alternatives, and goals of treatment  Discussed specific risks related to OAB meds including, but not limited to dry mouth, constipation, blurry vision, cognitive changes, and BP elevation.  Discussed botox vs PTNS as nonpham options for mgmt of pt's sx    Discussed management options for KRYS including expectant management, pelvic floor PT, pessary, and  surgery  Discussed risks and benefits associated with each option  Discussed urodynamic testing    Discussed mgmt of vulvovaginal atrophy with vaginal estrogen cream. Reviewed associated benefits, risks, alternatives, and goals. Recommend low dose twice weekly mgmt     Diagnostic Items:  Urine testing    Medications Discussed:  Estrace Cream  OAB meds    Treatment Plan, Non-surgical:   RN teaching/pt education done  Fiber supplement  Estrace / Premarin cream    Treatment Plan, Surgical: mentioned  Mid-urethral slings (Trans Vaginal Taping, TOT, single-incision)    Pt verbalizes understanding of all above discussed information. All questions answered. She agrees to plan    Return in about 3 months (around 7/25/2025) for PT f/u.    Prabha Tavarez, DO, FACOG, FACS      Discussion undertaken in English, RN      The 21st Century Cures Act makes medical notes like these available to patients in the interest of transparency. However, be advised this is a medical document. It is intended as peer to peer communication. It is written in medical language and may contain abbreviations or verbiage that are unfamiliar. It may appear blunt or direct. Medical documents are intended to carry relevant information, facts as evident, and the clinical opinion of the practitioner.          [1]   Past Medical History:   Alopecia areata    HAS HAD INJECTIONS OF STEROIDS INTO THE SCALP WITH DERM-DR NATHALIE DUTTON    Breast CA (HCC)    Cancer of overlapping sites of left female breast (HCC)    STAGE 3; CAF 6 CYCLES ; 12+ NODES; 30 XRT TX     Dense breast tissue on mammogram    Depressive disorder, not elsewhere classified    ON ZOLOFT    Disorder of bone and cartilage, unspecified    OSTEOPENIA; DEXA 4/4/08-NORMAL    Displacement of lumbar intervertebral disc without myelopathy    L4-5    Hypertonicity of bladder    MALIGN NEOPL BREAST NOS (HCC)    Malignant neoplasm of breast (female), unspecified site    STAGE 3; CAF 6 CYCLES ; 12+ NODES;  30 XRT TX    OSTEOPENIA    Osteopenia of multiple sites    Osteopenia of neck of left femur    Osteoporosis    OTHER DISEASES    Personal history of antineoplastic chemotherapy    PONV (postoperative nausea and vomiting)    Unspecified vitamin D deficiency    Vitamin D deficiency   [2]   Past Surgical History:  Procedure Laterality Date    Breast surgery procedure unlisted  1987    mastectomy    Chemotherapy  1987    Colonoscopy  3/2013    hemorrhoids. repeat 5 yrs, 2 day prep    Colonoscopy N/A 1/23/2024    Procedure: COLONOSCOPY;  Surgeon: David Mcmillan MD;  [facility]: [facility] END[facility]    Colonoscopy,diagnostic  11/21/03    INTERNAL HEMORRHOIDS ; DUE FOR REPEAT IN 8-10 YRS WITH DR EPSTEIN    Colonoscopy,diagnostic  3/1/2013    Procedure: COLONOSCOPY, POSSIBLE BIOPSY, POSSIBLE POLYPECTOMY 27717;  Surgeon: Nash Soler MD;  Location: [facility], [facility]    Hysterectomy      and USO    Mastectomy left  09/1987    Oophorectomy      per NG: x1    Radiation left Left 1988    Reduction right  1988    Special service or report      B/L BUNIONECTOMIES    Special service or report  AGE 31    Left MRM FOR STAGE 3 BREAST CA    Special service or report  AGE 32    LEFT BREAST RECONSTRUCTION WITH SALINE IMPLANT    Special service or report  AGE 32    RIGHT BREAST REDUCTION    Vaginal hysterectomy      WITH ONE SO FOR CYST   [3]   Family History  Problem Relation Age of Onset    Cancer Father         CLL; BCCA;SQCCA; PAROTID TUMOR; prostate ca, BCC, SCC    Heart Disorder Father         ARRHYTHMIA ON COUMADIN; AFIB    Ear Problems Father         per NG: hearing defeciency    Other (Other) Father     Hypertension Mother     Arthritis Mother         per NG: rheumatoid arthritis    Dementia Mother         lewey body dementia    Other (Other) Mother         RA;TB;PE &DVT    Breast Cancer Daughter 39        MyRisk negative (2016)    Cancer Maternal Grandmother 65        COLON    Stroke Maternal Grandfather     Other (Other)  Maternal Grandfather         CVA    Cancer Paternal Grandmother         LUNG     Other (Other) Sister         OSTEOPENIA    Cancer Maternal Aunt 50        uterine ca    Cancer Maternal Aunt         thyroid/renal ca (non-smoker)    Breast Cancer Self 31   [4]   Social History  Socioeconomic History    Marital status:    Occupational History     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   Tobacco Use    Smoking status: Never    Smokeless tobacco: Never   Vaping Use    Vaping status: Never Used   Substance and Sexual Activity    Alcohol use: Not Currently     Comment: ONE PER MTH    Drug use: No   Other Topics Concern    Reaction to local anesthetic No    Pt has a pacemaker No    Pt has a defibrillator No   Social History Narrative     X 28YRS    NOT CURRENTLY DATING   [5]   Allergies  Allergen Reactions    Adhesive Tape HIVES    Penicillins HIVES   [6]   Outpatient Medications Prior to Visit   Medication Sig Dispense Refill    estradiol 0.1 MG/GM Vaginal Cream Place 1 g vaginally daily. -for 1 week , than twice per week as needed 42.5 g 3    sertraline 50 MG Oral Tab Take 1 tablet (50 mg total) by mouth daily. 90 tablet 3    MAGNESIUM GLYCINATE OR Take 200 mg by mouth in the morning.      Vitamin D3 50 MCG (2000 UT) Oral Cap Take 50 mcg by mouth in the morning.      Probiotic Product (ALIGN) Oral Cap Take by mouth.      MULTI-VITAMIN/MINERALS OR TABS       NON FORMULARY 2 scoops of collagen powder a day       No facility-administered medications prior to visit.

## 2025-05-01 ENCOUNTER — OFFICE VISIT (OUTPATIENT)
Dept: INTERNAL MEDICINE CLINIC | Facility: CLINIC | Age: 70
End: 2025-05-01
Payer: MEDICARE

## 2025-05-01 VITALS
HEIGHT: 67.5 IN | WEIGHT: 123 LBS | BODY MASS INDEX: 19.08 KG/M2 | SYSTOLIC BLOOD PRESSURE: 91 MMHG | DIASTOLIC BLOOD PRESSURE: 60 MMHG | HEART RATE: 80 BPM

## 2025-05-01 DIAGNOSIS — H90.3 SENSORINEURAL HEARING LOSS, BILATERAL: ICD-10-CM

## 2025-05-01 DIAGNOSIS — E55.9 VITAMIN D DEFICIENCY: ICD-10-CM

## 2025-05-01 DIAGNOSIS — H93.12 TINNITUS, LEFT EAR: ICD-10-CM

## 2025-05-01 DIAGNOSIS — E78.00 PURE HYPERCHOLESTEROLEMIA: ICD-10-CM

## 2025-05-01 DIAGNOSIS — Z00.00 ENCOUNTER FOR ANNUAL HEALTH EXAMINATION: Primary | ICD-10-CM

## 2025-05-01 DIAGNOSIS — M85.89 OSTEOPENIA OF MULTIPLE SITES: ICD-10-CM

## 2025-05-01 DIAGNOSIS — Z85.3 HISTORY OF LEFT BREAST CANCER: ICD-10-CM

## 2025-05-01 DIAGNOSIS — I73.00 RAYNAUD'S PHENOMENON WITHOUT GANGRENE: ICD-10-CM

## 2025-05-01 DIAGNOSIS — Z80.0 FAMILY HISTORY OF MALIGNANT NEOPLASM OF GASTROINTESTINAL TRACT: ICD-10-CM

## 2025-05-01 DIAGNOSIS — Z78.0 POST-MENOPAUSAL: ICD-10-CM

## 2025-05-01 DIAGNOSIS — R22.42 MASS OF SKIN OF LEFT LOWER LEG: ICD-10-CM

## 2025-05-01 DIAGNOSIS — F32.A MILD DEPRESSION: ICD-10-CM

## 2025-05-01 DIAGNOSIS — D72.810 LYMPHOCYTOPENIA: ICD-10-CM

## 2025-05-01 DIAGNOSIS — G57.61 MORTON'S NEUROMA OF RIGHT FOOT: ICD-10-CM

## 2025-05-01 DIAGNOSIS — I83.891 VARICOSE VEINS OF RIGHT LOWER EXTREMITY WITH COMPLICATIONS: ICD-10-CM

## 2025-05-01 DIAGNOSIS — R32 URINARY INCONTINENCE, UNSPECIFIED TYPE: ICD-10-CM

## 2025-05-01 DIAGNOSIS — Z83.2 FAMILY HISTORY OF PROTEIN C DEFICIENCY: ICD-10-CM

## 2025-05-01 PROBLEM — Z86.39 HISTORY OF VITAMIN D DEFICIENCY: Status: RESOLVED | Noted: 2019-10-31 | Resolved: 2025-05-01

## 2025-05-01 PROBLEM — H61.23 BILATERAL IMPACTED CERUMEN: Status: RESOLVED | Noted: 2023-02-14 | Resolved: 2025-05-01

## 2025-05-01 PROBLEM — I49.9 IRREGULAR HEARTBEAT: Status: RESOLVED | Noted: 2023-02-14 | Resolved: 2025-05-01

## 2025-05-01 PROBLEM — H91.90 HEARING LOSS: Status: RESOLVED | Noted: 2018-07-09 | Resolved: 2025-05-01

## 2025-05-01 NOTE — PROGRESS NOTES
Subjective:   Eda Theodore is a 69 year old female who presents for a Medicare Wellness Visit charge within the last 11 months and Patient may not meet criteria for AWV: Please evaluate for correct coding and scheduled follow up of multiple significant but stable problems.   History of Present Illness            Pt presents to clinic for annual medicare physical. Follows with Oncology due to hx of breast cancer >30 years ago. S/p LEWIS/BSO. Overall feeling well.     History/Other:   Fall Risk Assessment:   She has been screened for Falls and is low risk.      Cognitive Assessment:   She had a completely normal cognitive assessment - see flowsheet entries     Functional Ability/Status:   Eda Theodore has some abnormal functions as listed below:  She has Hearing problems based on screening of functional status.    Consider audiology referral in the near future.     Depression Screening (PHQ):  PHQ-2 SCORE: 0  , done 5/1/2025          Advanced Directives:   She does have a Living Will but we do NOT have it on file in Epic.    She does have a POA but we do NOT have it on file in "[employer].    Patient has Advance Care Planning documents but we do not have a copy in EMR. Discussed Advanced Care Planning with patient and instructed patient to get our office a copy to be scanned into EMR.      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    Current Medications:  Outpatient Medications Marked as Taking for the 5/1/25 encounter (Office Visit) with Chani Navarrete APRN   Medication Sig    estradiol 0.1 MG/GM Vaginal Cream Place 1 g vaginally daily. -for 1 week , than twice per week as needed    sertraline 50 MG Oral Tab Take 1 tablet (50 mg total) by mouth daily.    MAGNESIUM GLYCINATE OR Take 200 mg by mouth in the morning.    NON FORMULARY 2 scoops of collagen powder a day    Vitamin D3 50 MCG (2000 UT) Oral Cap Take 50 mcg by mouth in the morning.    Probiotic Product (ALIGN) Oral Cap Take by mouth.    MULTI-VITAMIN/MINERALS OR TABS        Medical History:  She  has a past medical history of Alopecia areata, Anal fissure (??), Bilateral impacted cerumen (02/14/2023), Breast CA ([facility]), Cancer of overlapping sites of left female breast ([facility]) (09/04/1987), Dense breast tissue on mammogram (11/05/2021), Depression, Depressive disorder, not elsewhere classified, Disorder of bone and cartilage, unspecified (2005), Displacement of lumbar intervertebral disc without myelopathy, Hearing loss (2018), Hearing loss (07/09/2018), Hemorrhoids (??), History of vitamin D deficiency (10/31/2019), Hypertonicity of bladder, Irregular heartbeat (02/14/2023), MALIGN NEOPL BREAST NOS (HCC) (11/27/2007), Malignant neoplasm of breast (female), unspecified site (09/04/1987), Muscle weakness (March 2021), OSTEOPENIA (7/2010 borderline osteopenia (stopped Fosamax at this time)), Osteopenia (?), Osteopenia of multiple sites (11/05/2016), Osteopenia of neck of left femur (11/05/2016), Osteoporosis, OTHER DISEASES (DEXA 2008 (normal)), Personal history of antineoplastic chemotherapy, PONV (postoperative nausea and vomiting), Unspecified vitamin D deficiency (04/16/2009), UTI (urinary tract  infection) (2022), and Vitamin D deficiency (2009).  Surgical History:  She  has a past surgical history that includes vaginal hysterectomy; colonoscopy,diagnostic (2003); special service or report; special service or report (AGE 31); special service or report (AGE 32); special service or report (AGE 32); hysterectomy; colonoscopy (2013); colonoscopy,diagnostic (2013); oophorectomy; breast surgery procedure unlisted (); mastectomy left (1987); chemotherapy (); radiation left (Left, ); reduction right (); colonoscopy (N/A, 2024);  (80); other surgical history; breast reconstruc w tiss expandr (); Breast reconstruction (); sigmoidoscopy,diagnostic; and total abdom hysterectomy ().   Family History:  Her family history includes Arthritis in her mother; Asthma in her daughter; Bleeding Disorders in her brother, brother, and mother; Breast Cancer (age of onset: 31) in her self; Breast Cancer (age of onset: 39) in her daughter; Cancer in her daughter, father, maternal aunt, and paternal grandmother; Cancer (age of onset: 50) in her maternal aunt; Cancer (age of onset: 65) in her maternal grandmother; Colon Cancer in her maternal grandmother; Dementia in her mother; Ear Problems in her father; Heart Disorder in her father; Hypertension in her mother; Other in her father, maternal grandfather, mother, and sister; Stroke in her maternal grandfather; Uterine Cancer in her maternal aunt.  Social History:  She  reports that she has never smoked. She has never used smokeless tobacco. She reports that she does not currently use alcohol. She reports that she does not use drugs.    Tobacco:  She has never smoked tobacco.    CAGE Alcohol Screen:   CAGE screening score of 0 on 2025, showing low risk of alcohol abuse.      Patient Care Team:  Ruby Silva MD as PCP - General (Internal Medicine)  Fadi Mendez MD as PCP - Internist (Internal  Medicine)    Review of Systems   Constitutional:  Negative for activity change, appetite change, fatigue, fever and unexpected weight change.   HENT:  Negative for congestion, dental problem and sore throat.    Eyes:  Negative for visual disturbance.   Respiratory:  Negative for cough, chest tightness, shortness of breath and wheezing.    Cardiovascular:  Negative for chest pain, palpitations and leg swelling.   Gastrointestinal:  Negative for abdominal pain, constipation, diarrhea, nausea and vomiting.   Endocrine: Negative.    Genitourinary:  Negative for difficulty urinating, frequency and menstrual problem.   Musculoskeletal:  Negative for arthralgias and back pain.   Skin:  Negative for color change, pallor, rash and wound.   Neurological:  Negative for dizziness, seizures, light-headedness, numbness and headaches.   Psychiatric/Behavioral:  Negative for behavioral problems, dysphoric mood and suicidal ideas. The patient is not nervous/anxious.        Objective:   Physical Exam  Vitals reviewed.   Constitutional:       General: She is not in acute distress.     Appearance: Normal appearance. She is normal weight. She is not ill-appearing.   HENT:      Head: Normocephalic and atraumatic.      Right Ear: Tympanic membrane, ear canal and external ear normal.      Left Ear: Tympanic membrane, ear canal and external ear normal.      Nose: Nose normal.      Mouth/Throat:      Pharynx: Oropharynx is clear.   Eyes:      General: No scleral icterus.        Right eye: No discharge.         Left eye: No discharge.      Extraocular Movements: Extraocular movements intact.      Conjunctiva/sclera: Conjunctivae normal.      Pupils: Pupils are equal, round, and reactive to light.   Neck:      Thyroid: No thyroid mass or thyromegaly.   Cardiovascular:      Rate and Rhythm: Normal rate and regular rhythm.      Pulses: Normal pulses.      Heart sounds: Normal heart sounds.   Pulmonary:      Effort: Pulmonary effort is normal. No  respiratory distress.      Breath sounds: Normal breath sounds.   Abdominal:      General: Abdomen is flat. Bowel sounds are normal. There is no distension.      Palpations: Abdomen is soft. There is no mass.      Tenderness: There is no abdominal tenderness.   Musculoskeletal:         General: Normal range of motion.      Cervical back: Normal range of motion and neck supple.      Right lower leg: No edema.      Left lower leg: No edema.   Lymphadenopathy:      Cervical: No cervical adenopathy.   Skin:     General: Skin is warm and dry.      Coloration: Skin is not jaundiced.      Comments: LLE posterior calf 3-4 mm round mass, mobile. No overlying erythema.    Neurological:      General: No focal deficit present.      Mental Status: She is alert and oriented to person, place, and time.      Motor: Motor function is intact.      Gait: Gait normal.   Psychiatric:         Mood and Affect: Mood normal.         Judgment: Judgment normal.         BP 91/60 (BP Location: Left arm, Patient Position: Sitting, Cuff Size: adult)   Pulse 80   Ht 5' 7.5\" (1.715 m)   Wt 123 lb (55.8 kg)   BMI 18.98 kg/m²  Estimated body mass index is 18.98 kg/m² as calculated from the following:    Height as of this encounter: 5' 7.5\" (1.715 m).    Weight as of this encounter: 123 lb (55.8 kg).    Medicare Hearing Assessment:   Hearing Screening    Time taken: 5/1/2025 10:40 AM  Entry User: Opal Corea MA  Screening Method: Finger Rub  Finger Rub Result: Pass         Visual Acuity:   Right Eye Visual Acuity: Corrected Right Eye Chart Acuity: 20/25   Left Eye Visual Acuity: Corrected Left Eye Chart Acuity: 20/20   Both Eyes Visual Acuity: Corrected Both Eyes Chart Acuity: 20/20   Able To Tolerate Visual Acuity: Yes        Assessment & Plan:   Eda Theodore is a 69 year old female who presents for a Medicare Assessment.     1. Encounter for annual health examination (Primary)  Education provided on healthy lifestyle.  Diet: reduce  saturated fats, simple carbs and excess sugars. Hydrate. Leafy greens, legumes, nuts/seeds, healthy sources of Omega 3, lean proteins, complex carbs, berries.   Exercise 30 min daily cardio as tolerated.   Immunizations reviewed and recommendations provided  Preventative health screening recommendations reviewed.   Previous lab and imaging results reviewed.    2. 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 No new complaints.   -consider compressive stockings.   3. Raynaud's phenomenon without gangrene.  -Stable, CPM.   4. Pure hypercholesterolemia  -FLP due.   -     Comp Metabolic Panel (14); Future; Expected date: 05/01/2025  -     Lipid Panel; Future; Expected date: 05/01/2025  -     TSH W Reflex To Free T4; Future; Expected date: 05/01/2025  5. Vitamin D deficiency  -Recheck Vitamin D  -     Vitamin D; Future; Expected date: 05/01/2025  6. Osteopenia of multiple sites  -Repeat DEXA ordered.   7. Ashby's neuroma of right foot  -Stable, CPM. Follows with Podiatry.   8. Mild depression  -Stable, CPM. No SI/HI reported.   9. Urinary incontinence, unspecified type  -Stable, CPM. Follows with Urology.   10. Family history of protein C deficiency  -Pt reports her testing was negative (sister was diagnosed). Follows with heme/onc.   11. History of left breast cancer  -Stable, CPM. Follows with oncologist Dr Edmonds.   Overview:  STAGE 3; CAF 6 CYCLES ; 30 XRT TX  12. Family history of malignant neoplasm of gastrointestinal tract  -UTD on colonoscopy.   13. Sensorineural hearing loss, bilateral  -Stable, CPM.   14. Lymphocytopenia  -Stable, CPM.  Follows with heme/onc.   -     CBC With Differential With Platelet; Future; Expected date: 05/01/2025  15. Post-menopausal  -Repeat DEXA ordered.   -     XR DEXA BONE DENSITOMETRY (CPT=77080); Future; Expected date: 05/01/2025  16. Tinnitus, left ear  -Stable, CPM.   17. Mass of skin of left lower leg  -Pt has not scheduled appt with plastic surgeon  (was referred to by derm). Updated referral. No changes reported.   -     Plastic Surgery Referral - In Network  [unfilled]            The patient indicates understanding of these issues and agrees to the plan.  Consult ordered.  Continue with current treatment plan.  Imaging studies ordered.  Lab work ordered.  Reinforced healthy diet, lifestyle, and exercise.      Return in 3 months (on 8/1/2025).     Chaniglenis Navarrete, APRN, 5/1/2025     Supplementary Documentation:   General Health:  In the past six months, have you lost more than 10 pounds without trying?: 2 - No  Has your appetite been poor?: No  Type of Diet: Balanced  How does the patient maintain a good energy level?: Appropriate Exercise, Stretching  How would you describe your daily physical activity?: Moderate  How would you describe your current health state?: Good  How do you maintain positive mental well-being?: Social Interaction, Visiting Family, Visiting Friends  On a scale of 0 to 10, with 0 being no pain and 10 being severe pain, what is your pain level?: 1 - (Mild)  In the past six months, have you experienced urine leakage?: 1-Yes  At any time do you feel concerned for the safety/well-being of yourself and/or your children, in your home or elsewhere?: No  Have you had any immunizations at another office such as Influenza, Hepatitis B, Tetanus, or Pneumococcal?: Yes    Health Maintenance   Topic Date Due    COVID-19 Vaccine (7 - 2024-25 season) 09/01/2024    Annual Physical  02/14/2025    Mammogram  11/12/2025    Colorectal Cancer Screening  01/23/2029    Influenza Vaccine  Completed    DEXA Scan  Completed    Annual Depression Screening  Completed    Fall Risk Screening (Annual)  Completed    Pneumococcal Vaccine: 50+ Years  Completed    Zoster Vaccines  Completed    Meningococcal B Vaccine  Aged Out

## 2025-05-05 ENCOUNTER — ORDER TRANSCRIPTION (OUTPATIENT)
Dept: PHYSICAL THERAPY | Facility: HOSPITAL | Age: 70
End: 2025-05-05

## 2025-05-05 DIAGNOSIS — N39.41 URGE INCONTINENCE: ICD-10-CM

## 2025-05-05 DIAGNOSIS — N81.84 PELVIC MUSCLE WASTING: Primary | ICD-10-CM

## 2025-05-05 DIAGNOSIS — R35.1 NOCTURIA: ICD-10-CM

## 2025-05-06 ENCOUNTER — PATIENT MESSAGE (OUTPATIENT)
Dept: INTERNAL MEDICINE CLINIC | Facility: CLINIC | Age: 70
End: 2025-05-06

## 2025-05-06 ENCOUNTER — LAB ENCOUNTER (OUTPATIENT)
Dept: LAB | Age: 70
End: 2025-05-06
Attending: NURSE PRACTITIONER
Payer: MEDICARE

## 2025-05-06 DIAGNOSIS — D72.819 LEUKOPENIA, UNSPECIFIED TYPE: Primary | ICD-10-CM

## 2025-05-06 DIAGNOSIS — E78.00 PURE HYPERCHOLESTEROLEMIA: ICD-10-CM

## 2025-05-06 DIAGNOSIS — D72.810 LYMPHOCYTOPENIA: ICD-10-CM

## 2025-05-06 DIAGNOSIS — E55.9 VITAMIN D DEFICIENCY: ICD-10-CM

## 2025-05-06 LAB
ALBUMIN SERPL-MCNC: 4.6 G/DL (ref 3.2–4.8)
ALBUMIN/GLOB SERPL: 1.6 {RATIO} (ref 1–2)
ALP LIVER SERPL-CCNC: 62 U/L (ref 55–142)
ALT SERPL-CCNC: 20 U/L (ref 10–49)
ANION GAP SERPL CALC-SCNC: 6 MMOL/L (ref 0–18)
AST SERPL-CCNC: 29 U/L (ref ?–34)
BASOPHILS # BLD AUTO: 0.02 X10(3) UL (ref 0–0.2)
BASOPHILS NFR BLD AUTO: 0.6 %
BILIRUB SERPL-MCNC: 0.9 MG/DL (ref 0.2–1.1)
BUN BLD-MCNC: 20 MG/DL (ref 9–23)
BUN/CREAT SERPL: 20.8 (ref 10–20)
CALCIUM BLD-MCNC: 9.4 MG/DL (ref 8.7–10.4)
CHLORIDE SERPL-SCNC: 104 MMOL/L (ref 98–112)
CHOLEST SERPL-MCNC: 192 MG/DL (ref ?–200)
CO2 SERPL-SCNC: 32 MMOL/L (ref 21–32)
CREAT BLD-MCNC: 0.96 MG/DL (ref 0.55–1.02)
DEPRECATED RDW RBC AUTO: 41.9 FL (ref 35.1–46.3)
EGFRCR SERPLBLD CKD-EPI 2021: 65 ML/MIN/1.73M2 (ref 60–?)
EOSINOPHIL # BLD AUTO: 0.17 X10(3) UL (ref 0–0.7)
EOSINOPHIL NFR BLD AUTO: 4.8 %
ERYTHROCYTE [DISTWIDTH] IN BLOOD BY AUTOMATED COUNT: 11.9 % (ref 11–15)
FASTING PATIENT LIPID ANSWER: YES
FASTING STATUS PATIENT QL REPORTED: YES
GLOBULIN PLAS-MCNC: 2.8 G/DL (ref 2–3.5)
GLUCOSE BLD-MCNC: 93 MG/DL (ref 70–99)
HCT VFR BLD AUTO: 44.8 % (ref 35–48)
HDLC SERPL-MCNC: 66 MG/DL (ref 40–59)
HGB BLD-MCNC: 14.5 G/DL (ref 12–16)
IMM GRANULOCYTES # BLD AUTO: 0.01 X10(3) UL (ref 0–1)
IMM GRANULOCYTES NFR BLD: 0.3 %
LDLC SERPL CALC-MCNC: 118 MG/DL (ref ?–100)
LYMPHOCYTES # BLD AUTO: 1.4 X10(3) UL (ref 1–4)
LYMPHOCYTES NFR BLD AUTO: 39.7 %
MCH RBC QN AUTO: 30.5 PG (ref 26–34)
MCHC RBC AUTO-ENTMCNC: 32.4 G/DL (ref 31–37)
MCV RBC AUTO: 94.3 FL (ref 80–100)
MONOCYTES # BLD AUTO: 0.27 X10(3) UL (ref 0.1–1)
MONOCYTES NFR BLD AUTO: 7.6 %
NEUTROPHILS # BLD AUTO: 1.66 X10 (3) UL (ref 1.5–7.7)
NEUTROPHILS # BLD AUTO: 1.66 X10(3) UL (ref 1.5–7.7)
NEUTROPHILS NFR BLD AUTO: 47 %
NONHDLC SERPL-MCNC: 126 MG/DL (ref ?–130)
OSMOLALITY SERPL CALC.SUM OF ELEC: 296 MOSM/KG (ref 275–295)
PLATELET # BLD AUTO: 244 10(3)UL (ref 150–450)
POTASSIUM SERPL-SCNC: 4 MMOL/L (ref 3.5–5.1)
PROT SERPL-MCNC: 7.4 G/DL (ref 5.7–8.2)
RBC # BLD AUTO: 4.75 X10(6)UL (ref 3.8–5.3)
SODIUM SERPL-SCNC: 142 MMOL/L (ref 136–145)
TRIGL SERPL-MCNC: 39 MG/DL (ref 30–149)
TSI SER-ACNC: 3.03 UIU/ML (ref 0.55–4.78)
VIT D+METAB SERPL-MCNC: 51.3 NG/ML (ref 30–100)
VLDLC SERPL CALC-MCNC: 7 MG/DL (ref 0–30)
WBC # BLD AUTO: 3.5 X10(3) UL (ref 4–11)

## 2025-05-06 PROCEDURE — 82306 VITAMIN D 25 HYDROXY: CPT

## 2025-05-06 PROCEDURE — 36415 COLL VENOUS BLD VENIPUNCTURE: CPT

## 2025-05-06 PROCEDURE — 80053 COMPREHEN METABOLIC PANEL: CPT

## 2025-05-06 PROCEDURE — 84443 ASSAY THYROID STIM HORMONE: CPT

## 2025-05-06 PROCEDURE — 85025 COMPLETE CBC W/AUTO DIFF WBC: CPT

## 2025-05-06 PROCEDURE — 80061 LIPID PANEL: CPT

## 2025-05-17 DIAGNOSIS — Z01.84 IMMUNITY STATUS TESTING: Primary | ICD-10-CM

## 2025-05-21 ENCOUNTER — OFFICE VISIT (OUTPATIENT)
Dept: PHYSICAL THERAPY | Age: 70
End: 2025-05-21
Attending: OBSTETRICS & GYNECOLOGY
Payer: MEDICARE

## 2025-05-21 DIAGNOSIS — N81.84 PELVIC MUSCLE WASTING: Primary | ICD-10-CM

## 2025-05-21 DIAGNOSIS — N39.41 URGE INCONTINENCE: ICD-10-CM

## 2025-05-21 DIAGNOSIS — R35.1 NOCTURIA: ICD-10-CM

## 2025-05-21 PROCEDURE — 97161 PT EVAL LOW COMPLEX 20 MIN: CPT

## 2025-05-21 PROCEDURE — 97530 THERAPEUTIC ACTIVITIES: CPT

## 2025-05-21 NOTE — PROGRESS NOTES
MUSCULOSKELETAL AND PELVIC FLOOR EVALUATION:     Diagnosis:   Pelvic muscle wasting (N81.84)  Nocturia (R35.1)  Urge incontinence (N39.41) Patient:  Eda Theodore (69 year old, female)        Referring Provider: Prabha Tavarez  Today's Date   2025    Precautions:  None   Date of Evaluation: 25  Next MD visit: No data recorded  Date of Surgery: n/a     PATIENT SUMMARY     UUI  History of current condition: Pt reports that she has had problems for many years with urinary urgency.  She has occasional urinary incontinence- large amount, on her way to the bathroom.  She was diagnosed at age 31 with stage 3 breast cancer and was on tamoxifen for 15 years.  She took Vesicare about 15 years ago and it was working well but stopped taking it again due to risk of dementia. She recalls that years ago, she was given instructions regarding bladder irritants, kegals, etc.  She has not been good about doing Kegals.  Reports rarely has KRYS- only when she has a very full bladder with exercise activities.  She always wears a panty liner.  She denies any bowel symptoms.  BSS #: 2-4.  She thinks that she probably needs to drink more water but estimates about 48 ounces/day.  Reports occasional straining if she doesn't eat her usual diet.   Pain level: current 0 /10, at best 0 /10, at worst 5 /10 (LBP)  Description of symptoms: Urinary urgency and incontinence   Occupation: Free lancing part-time   Leisure activities/Hobbies: Yoga and exercises for LBP, Elliptical   Prior level of function: Many years of UUI but seems to be worsening  Current limitations: Community activities due to urgency  Pt goals: Strengthen pelvic floor muscles to have better control of bladder  Pregnant Now: No   OB History    Para Term  AB Living   1 1 1 0 0 1   SAB IAB Ectopic Multiple Live Births   0 0 0 0 0      # Outcome Date GA Lbr Aric/2nd Weight Sex Type Anes PTL Lv   1 Term              Urodynamic Test: no   Manometry:  no   PFDI 20    Scores   POPDI 6:    29.17   CRAD 8:    25   JACKIE 6:    33.33   Summary:    87.5      Outpatient Therapy Pelvic Health Intake       Question 5/20/2025 10:11 PM CDT - Filed by Patient    Do you usually experience pressure in the lower abdomen? Not at all    Do you usually experience heaviness or dullness in the pelvic area? Not at all    Do you usually have a bulge or something falling out that you can see or feel in your vaginal area? Not at all    Do you ever have to push on the vagina or around the rectum to have or complete a bowel movement? Not at all    Do you usually experience a feeling of incomplete bladder emptying? Somewhat    Do you ever have to push up on a bulge in the vaginal area with your fingers to start or complete urination? Not at all    Please provide details on the following urinary history / complaints     Frequency of urination: # of times/day 6    Frequency of urination: # of times/night 1    When you have the urge to urinate, how long can you delay before you have to go to the toilet? (# of minutes or hours) 5 min    Do you have a history of urinary tract infections: Yes    Do you have a history of urine loss (select all that apply)       How many times a day does leakage occur? 0    If you have leakage, what type(s) of protective device(s) do you use? (Select all that apply)       On average, how many pads do you use each day?       Do you soak the pad fully?       Do you change the pad each time it is wet?       Do you experience any of these symptoms? (Select all that apply) Experience an urge to urinate when you hear running water     Cannot get to the toilet in time     Have difficulty stopping the urine stream     Dribble after you empty your bladder    Do you usually experience frequent urination? Not at all    Do you usually experience urine leakage associated with a feeling of urgency, that is, a strong sensation of needing to go to the bathroom? Moderately    Do you  usually experience urine leakage related to coughing, sneezing, or laughing? Not at all    Do you usually experience small amounts of urine leakage (that is, drops)? Not at all    Do you usually experience difficulty empyting your bladder? Not at all    Do you usually experience pain or discomfort in the lower abdomen or genital region? Not at all    Have you ever had any of the following treatment:     Have you ever done exercises to control urine loss? If so, for how long? Some    Has your doctor ever prescribed any medication for urine loss? Yes    Have you had any surgical procedures to treat urine loss? No    Please provide details on the following bowel history / complaints.     How many bowel movements do you have per day? 1    How many bowel movements do you have per night? 0    Do you experience diarrhea? If yes, how often? No    Do you feel you need to strain too hard to have a bowel movement? Not at all    Do you feel you have not completely emptied your bowels at the end of a bowel movement? Not at all    Do you usually lose stool beyond your control if your stool is well formed? Not at all    Do you usually lose stool beyond your control if your stool is loose? Not at all    Do you usually lose gas from the rectum beyond your control? Not at all    Do you usually have pain when you pass your stool? Not at all    Do you experience strong sense of urgency and have to rush to bathroom for bowel movement? Not at all    Does part of bowel ever pass through rectum and bulge outside during/after bowel movement? Not at all    Please provide details on your daily fluid/food intake.     On average, what is your daily fluid intake (1 glass=8ounces)? (# of glasses per day) 7    How many are caffeinated? (# of glasses per day) 0    Do you restrict fluids because of incontenence (leakage)? No    Do you include fiber in your diet (fruits, vegetables, bran, etc.)? Yes    Psychosocial information     Do you live alone?  Yes    Which recreational activities do you participate in if any? Yoga, hiking, not sure what is meant by recreational activities    Have you had to restrict your activities due to your pelvic health concerns? No    On a scale of 0 (no impairments) to 10 (severe impairments), what are your feelings about your urinary or bowel incontinence or pelvic pain? 7    Do you have pain with intercourse? No    Have you had any changes in intimate relationships/sexual function due to your symptoms?  No    Your personal safety is of utmost importance to us at [facility], please answer the following questions:     Are you being hurt, frightened, demeaned, or taken advantage of by anyone at your home or in your life?  No    Have you recently had thoughts of hurting yourself? No    Have you tried to hurt yourself in the past?  No    Pelvic Organ Prolapse Distress Inventory 6 Score (range: 0 - 100) 29.17    Colorectal-Anal Distress Inventory 8 Score (range: 0 - 100) 25    Urinary Distress Inventory 6 Score (range: 0 - 100) 33.33    PFDI Summary Score (range: 0 - 300) 87.5            Past medical history was reviewed with Eda.  Significant findings include: Breast Cancer, hysterectomy, LBP, Left ITB pain  Imaging/Tests: isaias Veras  has a past medical history of Alopecia areata, Anal fissure (??), Bilateral impacted cerumen (02/14/2023), Breast CA (HCC), Cancer of overlapping sites of left female breast (HCC) (09/04/1987), Dense breast tissue on mammogram (11/05/2021), Depression, Depressive disorder, not elsewhere classified, Disorder of bone and cartilage, unspecified (2005), Displacement of lumbar intervertebral disc without myelopathy, Hearing loss (2018), Hearing loss (07/09/2018), Hemorrhoids (??), History of vitamin D deficiency (10/31/2019), Hypertonicity of bladder, Irregular heartbeat (02/14/2023), MALIGN NEOPL BREAST NOS (HCC) (11/27/2007), Malignant neoplasm of breast (female), unspecified site  (1987), Muscle weakness (2021), OSTEOPENIA (2010 borderline osteopenia (stopped Fosamax at this time)), Osteopenia (?), Osteopenia of multiple sites (2016), Osteopenia of neck of left femur (2016), Osteoporosis, OTHER DISEASES (DEXA  (normal)), Personal history of antineoplastic chemotherapy, PONV (postoperative nausea and vomiting), Unspecified vitamin D deficiency (2009), UTI (urinary tract infection) (2022), and Vitamin D deficiency (2009).  She  has a past surgical history that includes vaginal hysterectomy; colonoscopy,diagnostic (2003); special service or report; special service or report (AGE 31); special service or report (AGE 32); special service or report (AGE 32); hysterectomy; colonoscopy (2013); colonoscopy,diagnostic (2013); oophorectomy; breast surgery procedure unlisted (); mastectomy left (1987); chemotherapy (); radiation left (Left, ); reduction right (); colonoscopy (N/A, 2024);  (80); other surgical history; breast reconstruc w tiss expandr (); Breast reconstruction (); sigmoidoscopy,diagnostic; and total abdom hysterectomy ().    ASSESSMENT  Eda presents to physical therapy evaluation with primary c/o urinary urgency and incontinence. The results of the objective tests and measures show decreased bilat hip abductor strength, decreased hamstring flexibility; internal pelvic floor muscles to be assessed at next visit due to time for pt's history and pt education. Functional deficits include but are not limited to Community activities due to urgency. Signs and symptoms are consistent with diagnosis of Pelvic muscle wasting (N81.84)  Nocturia (R35.1)  Urge incontinence (N39.41). Pt and PT discussed evaluation findings, pathology, POC and HEP.  Pt voiced understanding and performs HEP correctly without reported pain. Skilled Physical Therapy is medically necessary to address the above  impairments and reach functional goals.    OBJECTIVE:    Musculoskeletal  Posture: Posture: Sitting: right LE crossed over left, increased lumbar flexion   Pelvic Alignment: WNL   External Palpation: Tenderness at L1-2 spinous processes, bilat PSIS, Left mid ITB and left TFL      Informed consent for internal pelvic evaluation given:Yes (to be completed at next visit)       ROM and Strength  (* denotes performed with pain)  Trunk ROM     Flex WFL     Ext WFL*    R L     Side bend WFL WFL     Rotation WFL WFL   ,   Hip   MMT (-/5)    R L     Ext  5 5     Abd 4 4     ER 4 4     IR 5 5        Flexibility:  LE Flexibility R L     Hip Flexor WNL WNL     Hamstrings mod restricted mod restricted     ITB not tested not tested     Piriformis WNL WNL     Quads not tested not tested     Gastroc-soleus not tested not tested       Balance and Functional Mobility  SLS EO: R  >15 sec    L  >15 sec  Double leg squat: WNL        Today's Treatment and Response:   Pt education was provided on exam findings, treatment diagnosis, treatment plan, expectations, and prognosis.  Today's Treatment       5/21/2025   Pelvic Treatment   Therapeutic Activity Education provided on pelvic floor and pelvic organ anatomy and function (with use of picture). Discussed common contributing factors to urge incontinence, as well as strategies for improving bladder control. Discussed avoiding \"JIC\" urination, implementing distraction techniques and other urge deferment strategies and avoiding bladder irritants. Discussed ultimate goal of urinating 1x every 2.5-3 hours or 5-7x in a 24 hour period. Provided and discussed handouts including urge deferment.   Therapeutic Activity Minutes 25   Evaluation Minutes 35   Total Time Of Timed Procedures 25   Total Time Of Service-Based Procedures 35   Total Treatment Time 60        Patient was instructed in and issued a HEP for:  To be instructed at next visit.    Charges:  PT EVAL: Low Complexity, 2 Ther act  In  agreement with evaluation findings and clinical rationale, this evaluation involved LOW COMPLEXITY decision making due to no personal factors/comorbidities, 1-2 body structures involved/activity limitations, and stable symptoms as documented in the evaluation.                                                                       PLAN OF CARE:    Goals: (to be met in 10 visits)    Not Met Progress Toward Partially Met Met   Patient will improve PERF score to at least 3/5/5/4 for improved pelvic floor function and pelvic organ support. [] [] [] []   Patient will report decreased urinary frequency to 8x/day and 1x/night indicating normalizing micturition reflex for improved bladder health and function. [] [] [] []   Patient will report ability to postpone urination for at least 30 mins after initial urge presents for improved ability to participate in community outings without fear of UI. [] [] [] []   Patient will report adherence to HEP for continued exercise benefits following cessation of PT. [] [] [] []       Frequency / Duration: Patient will be seen 1x/week or a total of 10  visits over a 90 day period. Treatment will include: Neuromuscular Re-education; Therapeutic Activities; Therapeutic Exercise; Home Exercise Program instruction    Education or treatment limitation: None   Rehab Potential: good       Patient/Family/Caregiver was advised of these findings, precautions, and treatment options and has agreed to actively participate in planning and for this course of care.    Thank you for your referral. Please co-sign or sign and return this letter via fax as soon as possible to [phone]. If you have any questions, please contact me at Dept: [phone]    Sincerely,  Electronically signed by therapist: Meghan Lobato PT  Physician's certification required: Yes  I certify the need for these services furnished under this plan of treatment and while under my  care.    X___________________________________________________ Date____________________    Certification From: 5/21/2025  To:8/19/2025

## 2025-05-28 ENCOUNTER — OFFICE VISIT (OUTPATIENT)
Dept: PHYSICAL THERAPY | Age: 70
End: 2025-05-28
Attending: OBSTETRICS & GYNECOLOGY
Payer: MEDICARE

## 2025-05-28 PROCEDURE — 97530 THERAPEUTIC ACTIVITIES: CPT

## 2025-05-28 PROCEDURE — 97112 NEUROMUSCULAR REEDUCATION: CPT

## 2025-05-28 NOTE — PROGRESS NOTES
Patient: Eda Theodore (69 year old, female) Referring Provider:  Insurance:   Diagnosis: Pelvic muscle wasting (N81.84)  Nocturia (R35.1)  Urge incontinence (N39.41) Prabha Tavarez  MEDICARE   Date of Surgery: n/a Next MD visit:  [employer] [place] INDEMNITY   Precautions:  None No data recorded Referral Information:    Date of Evaluation: Req: 0, Auth: 0, Exp:     05/21/25 POC Auth Visits:  10        Today's Date   5/28/2025    Subjective  Pt reports that she has been trying to not go to the bathroom just in case and it worked pretty well.  Kept track of how often she is going to the bathroom and her frequency was 2-4 hours during the day.       Pain: 0/10     Objective    Informed consent for internal pelvic evaluation given: yes     External Observation: All WNL  Voluntary contraction:     Voluntary relaxation:     Involuntary contraction:     Involuntary relaxation:     Mons pubis:     Labia majora:    Labia minora:     Urethral meatus:     Introitus:     Perineal body:      Internal Examination     Pelvic Floor Muscle strength: (PERF= Power/Endurance/Reps/Fast) MMT:  Power Endurance REPS Fast    3  10 3  Not tested      Accessory Muscle Use:  Breath holding     Tissue Laxity Test:  Anterior Wall:  WNL  Posterior Wall:  WNL   Apical:   N/a       Internal Palpation:  WNL   Superficial Transverse Perineal      Bulbocavernosus      Ischiocavernosus     Deep Transverse Perineal     Pubococcygeus/Pubovaginalis     Iliococcygeus               Assessment  Internal pelvic assessment was completed today and pt presents with fair strength of PFM but tends to breath hold.  After instructions for diaphragmatic breathing, pt was able to perform PFM contraction with exhale.    Goals (to be met in 10 visits)      Not Met Progress Toward Partially Met Met   Patient will improve PERF score to at least 3/10/5/4 for improved pelvic floor function and pelvic organ support. [] [] [] []   Patient will report decreased urinary frequency to  8x/day and 1x/night indicating normalizing micturition reflex for improved bladder health and function. [] [] [] []   Patient will report ability to postpone urination for at least 30 mins after initial urge presents for improved ability to participate in community outings without fear of UI. [] [] [] []   Patient will report adherence to HEP for continued exercise benefits following cessation of PT. [] [] [] []           Plan  Continue with diaphragmatic and PFM facilitation activities.    Treatment Last 4 Visits  Treatment Day: 2       5/21/2025 5/28/2025   Pelvic Treatment   Neuro Re-Education  Instructions for diaphragmatic breathing with emphasis of core 4 contractions on exhale  Supine diaphragmatic breathing  Supine hip adduction with yoga block with exhale   Therapeutic Activity Education provided on pelvic floor and pelvic organ anatomy and function (with use of picture). Discussed common contributing factors to urge incontinence, as well as strategies for improving bladder control. Discussed avoiding \"JIC\" urination, implementing distraction techniques and other urge deferment strategies and avoiding bladder irritants. Discussed ultimate goal of urinating 1x every 2.5-3 hours or 5-7x in a 24 hour period. Provided and discussed handouts including urge deferment. Reviewed bladder diary  Internal pelvic floor assessment and reviewed findings  Pt education regarding inverse relationship of diaphragm and PFM     Neuro Re-Educ Minutes  15   Therapeutic Activity Minutes 25 25   Evaluation Minutes 35    Total Time Of Timed Procedures 25 40   Total Time Of Service-Based Procedures 35 0   Total Treatment Time 60 40   HEP  Diaphragmatic breathing  Supine hip adduction isometric with deep breathing        HEP  Diaphragmatic breathing  Supine hip adduction isometric with deep breathing    Charges  2 Ther act, 1 NMR

## 2025-05-29 DIAGNOSIS — E78.5 DYSLIPIDEMIA: Primary | ICD-10-CM

## 2025-06-04 ENCOUNTER — OFFICE VISIT (OUTPATIENT)
Dept: PHYSICAL THERAPY | Age: 70
End: 2025-06-04
Attending: OBSTETRICS & GYNECOLOGY
Payer: MEDICARE

## 2025-06-04 PROCEDURE — 97112 NEUROMUSCULAR REEDUCATION: CPT

## 2025-06-04 NOTE — PROGRESS NOTES
Patient: Eda Theodore (69 year old, female) Referring Provider:  Insurance:   Diagnosis: Pelvic muscle wasting (N81.84)  Nocturia (R35.1)  Urge incontinence (N39.41) Prabha Tavarez  MEDICARE   Date of Surgery: n/a Next MD visit:  [employer] [place]DEMN[place]   Precautions:  None No data recorded Referral Information:    Date of Evaluation: Req: 0, Auth: 0, Exp:     05/21/25 POC Auth Visits:  10        Today's Date   6/4/2025    Subjective  Pt states that she has been busy taking care of matters for her uncle so has not done as much exercise this week.  She has been more conscious of not going to the bathroom just in case.  She had one small leakage yesterday when she waited too long to go to the bathroom.       Pain: 0/10     Objective  See treatment log below          Assessment  Pt does many exercises regularly so reviewed her current exercises and instructed on breathing for better engagement of PFM.  Pt will try to incorporate proper breathing sequencing this week when she does these at home.    Goals (to be met in 10 visits)      Not Met Progress Toward Partially Met Met   Patient will improve PERF score to at least 3/10/5/4 for improved pelvic floor function and pelvic organ support. [] [] [] []   Patient will report decreased urinary frequency to 8x/day and 1x/night indicating normalizing micturition reflex for improved bladder health and function. [] [] [] []   Patient will report ability to postpone urination for at least 30 mins after initial urge presents for improved ability to participate in community outings without fear of UI. [] [] [] []   Patient will report adherence to HEP for continued exercise benefits following cessation of PT. [] [] [] []               Plan  Continue with diaphragmatic and PFM facilitation activities.    Treatment Last 4 Visits  Treatment Day: 3       5/21/2025 5/28/2025 6/4/2025   Pelvic Treatment   Neuro Re-Education  Instructions for diaphragmatic breathing with emphasis of core 4  contractions on exhale  Supine diaphragmatic breathing  Supine hip adduction with yoga block with exhale Supine diaphragmatic breathing 6x  Supine hip adduction with yoga block with exhale 10x  Bridges with yoga block with exhale 10x  Bridges with bilat shoulder flexion with exhale 5x  Quadruped LE extension with exhale 5x R/L  Quadruped alternating UE/LE with exhale 5x     Therapeutic Activity Education provided on pelvic floor and pelvic organ anatomy and function (with use of picture). Discussed common contributing factors to urge incontinence, as well as strategies for improving bladder control. Discussed avoiding \"JIC\" urination, implementing distraction techniques and other urge deferment strategies and avoiding bladder irritants. Discussed ultimate goal of urinating 1x every 2.5-3 hours or 5-7x in a 24 hour period. Provided and discussed handouts including urge deferment. Reviewed bladder diary  Internal pelvic floor assessment and reviewed findings  Pt education regarding inverse relationship of diaphragm and PFM      Neuro Re-Educ Minutes  15 43   Therapeutic Activity Minutes 25 25    Evaluation Minutes 35     Total Time Of Timed Procedures 25 40 43   Total Time Of Service-Based Procedures 35 0 0   Total Treatment Time 60 40 43   HEP  Diaphragmatic breathing  Supine hip adduction isometric with deep breathing         HEP  Diaphragmatic breathing  Supine hip adduction isometric with deep breathing    Charges  3 NMR

## 2025-06-06 NOTE — TELEPHONE ENCOUNTER
Patient calling regards time of arrival and procedure due to getting a ride. Please call and advise.
Patient contacted. She wanted to confirm her arrival time which was done. She also wanted to know about dulcolax instructions prior to procedure since Miralax Gatroade prep which was reviewed using Nicol's note in 8/28/23 encounter. All questions answered to patient's satisfaction.
Pt states she does not need a call back has already received answers for her questions thanks
Returned patient's call. No answer. LMTCB.
Soft Bite Size/Thin

## 2025-06-11 ENCOUNTER — LAB ENCOUNTER (OUTPATIENT)
Dept: LAB | Facility: REFERENCE LAB | Age: 70
End: 2025-06-11
Attending: NURSE PRACTITIONER
Payer: MEDICARE

## 2025-06-11 ENCOUNTER — OFFICE VISIT (OUTPATIENT)
Dept: PHYSICAL THERAPY | Age: 70
End: 2025-06-11
Attending: OBSTETRICS & GYNECOLOGY
Payer: MEDICARE

## 2025-06-11 DIAGNOSIS — D72.819 LEUKOPENIA, UNSPECIFIED TYPE: ICD-10-CM

## 2025-06-11 DIAGNOSIS — Z01.84 IMMUNITY STATUS TESTING: ICD-10-CM

## 2025-06-11 LAB
BASOPHILS # BLD AUTO: 0.02 X10(3) UL (ref 0–0.2)
BASOPHILS NFR BLD AUTO: 0.5 %
DEPRECATED RDW RBC AUTO: 40.6 FL (ref 35.1–46.3)
EOSINOPHIL # BLD AUTO: 0.07 X10(3) UL (ref 0–0.7)
EOSINOPHIL NFR BLD AUTO: 1.8 %
ERYTHROCYTE [DISTWIDTH] IN BLOOD BY AUTOMATED COUNT: 11.8 % (ref 11–15)
HCT VFR BLD AUTO: 41.8 % (ref 35–48)
HGB BLD-MCNC: 13.7 G/DL (ref 12–16)
IMM GRANULOCYTES # BLD AUTO: 0 X10(3) UL (ref 0–1)
IMM GRANULOCYTES NFR BLD: 0 %
LYMPHOCYTES # BLD AUTO: 1.44 X10(3) UL (ref 1–4)
LYMPHOCYTES NFR BLD AUTO: 37.9 %
MCH RBC QN AUTO: 30.9 PG (ref 26–34)
MCHC RBC AUTO-ENTMCNC: 32.8 G/DL (ref 31–37)
MCV RBC AUTO: 94.1 FL (ref 80–100)
MONOCYTES # BLD AUTO: 0.31 X10(3) UL (ref 0.1–1)
MONOCYTES NFR BLD AUTO: 8.2 %
NEUTROPHILS # BLD AUTO: 1.96 X10 (3) UL (ref 1.5–7.7)
NEUTROPHILS # BLD AUTO: 1.96 X10(3) UL (ref 1.5–7.7)
NEUTROPHILS NFR BLD AUTO: 51.6 %
PLATELET # BLD AUTO: 266 10(3)UL (ref 150–450)
RBC # BLD AUTO: 4.44 X10(6)UL (ref 3.8–5.3)
RUBV IGG SER QL: POSITIVE
RUBV IGG SER-ACNC: 187 IU/ML (ref 10–?)
WBC # BLD AUTO: 3.8 X10(3) UL (ref 4–11)

## 2025-06-11 PROCEDURE — 86765 RUBEOLA ANTIBODY: CPT

## 2025-06-11 PROCEDURE — 97112 NEUROMUSCULAR REEDUCATION: CPT

## 2025-06-11 PROCEDURE — 86735 MUMPS ANTIBODY: CPT

## 2025-06-11 PROCEDURE — 97530 THERAPEUTIC ACTIVITIES: CPT

## 2025-06-11 PROCEDURE — 86762 RUBELLA ANTIBODY: CPT

## 2025-06-11 PROCEDURE — 36415 COLL VENOUS BLD VENIPUNCTURE: CPT

## 2025-06-11 PROCEDURE — 85025 COMPLETE CBC W/AUTO DIFF WBC: CPT

## 2025-06-11 NOTE — PROGRESS NOTES
Patient: Eda Theodore (69 year old, female) Referring Provider:  Insurance:   Diagnosis: Pelvic muscle wasting (N81.84)  Nocturia (R35.1)  Urge incontinence (N39.41) Prabha Tavarez  MEDICARE   Date of Surgery: n/a Next MD visit:  [facility] [place]   Precautions:  None No data recorded Referral Information:    Date of Evaluation: Req: 0, Auth: 0, Exp:     05/21/25 POC Auth Visits:  10        Today's Date   6/11/2025    Subjective  Pt reports that she has been moving a lot of things in her basement so has not been able to do as much exercise this week.  She is noticing the difference of less exercise in her back.  She is doing better with urge control and not going to the bathroom just in case.  She only had 1 small leak getting up from bed one day.       Pain:       Objective  See treatment log below       Assessment  Pt had better sequencing of deep breathing with activities today.  Pt is used to do more advanced exercises from videos that she uses but worked more on the basics of core engagement and maintaining neutral spine with movements.  Pt demonstrated better stabilization by end of treatment.    Goals (to be met in 10 visits)      Not Met Progress Toward Partially Met Met   Patient will improve PERF score to at least 3/10/5/4 for improved pelvic floor function and pelvic organ support. [] [] [] []   Patient will report decreased urinary frequency to 8x/day and 1x/night indicating normalizing micturition reflex for improved bladder health and function. [] [] [] []   Patient will report ability to postpone urination for at least 30 mins after initial urge presents for improved ability to participate in community outings without fear of UI. [] [] [] []   Patient will report adherence to HEP for continued exercise benefits following cessation of PT. [] [] [] []                   Plan  Continue with diaphragmatic and PFM facilitation activities.  Progress core stabilization activities.    Treatment Last 4  Visits  Treatment Day: 4       5/21/2025 5/28/2025 6/4/2025 6/11/2025   Pelvic Treatment   Neuro Re-Education  Instructions for diaphragmatic breathing with emphasis of core 4 contractions on exhale  Supine diaphragmatic breathing  Supine hip adduction with yoga block with exhale Supine diaphragmatic breathing 6x  Supine hip adduction with yoga block with exhale 10x  Bridges with yoga block with exhale 10x  Bridges with bilat shoulder flexion with exhale 5x  Quadruped LE extension with exhale 5x R/L  Quadruped alternating UE/LE with exhale 5x   Supine hip adduction with yoga block with exhale 10x   Bridges with yoga block with exhale 15x   Supine LE march with exhale 10x  Supine up/up/down/down with exhale 8x  Quadruped LE extension with exhale 8x R/L        Therapeutic Activity Education provided on pelvic floor and pelvic organ anatomy and function (with use of picture). Discussed common contributing factors to urge incontinence, as well as strategies for improving bladder control. Discussed avoiding \"JIC\" urination, implementing distraction techniques and other urge deferment strategies and avoiding bladder irritants. Discussed ultimate goal of urinating 1x every 2.5-3 hours or 5-7x in a 24 hour period. Provided and discussed handouts including urge deferment. Reviewed bladder diary  Internal pelvic floor assessment and reviewed findings  Pt education regarding inverse relationship of diaphragm and PFM    Discussed symptoms and reviewed bladder diary   Neuro Re-Educ Minutes  15 43 30   Therapeutic Activity Minutes 25 25  10   Evaluation Minutes 35      Total Time Of Timed Procedures 25 40 43 40   Total Time Of Service-Based Procedures 35 0 0 0   Total Treatment Time 60 40 43 40   HEP  Diaphragmatic breathing  Supine hip adduction isometric with deep breathing          HEP  Diaphragmatic breathing  Supine hip adduction isometric with deep breathing    Charges  2 NMR, 1 Ther act

## 2025-06-13 LAB
MEASLES (RUBEOLA) AB, IGM, S: NEGATIVE
MEV IGG SER-ACNC: >300 AU/ML (ref 16.5–?)
MUV IGG SER IA-ACNC: 240 AU/ML (ref 11–?)

## 2025-06-18 ENCOUNTER — OFFICE VISIT (OUTPATIENT)
Dept: PHYSICAL THERAPY | Age: 70
End: 2025-06-18
Attending: OBSTETRICS & GYNECOLOGY
Payer: MEDICARE

## 2025-06-18 PROCEDURE — 97112 NEUROMUSCULAR REEDUCATION: CPT

## 2025-06-18 NOTE — PROGRESS NOTES
Patient: Eda Theodore (69 year old, female) Referring Provider:  Insurance:   Diagnosis: Pelvic muscle wasting (N81.84)  Nocturia (R35.1)  Urge incontinence (N39.41) Prabha Tavarez  MEDICARE   Date of Surgery: n/a Next MD visit:  [facility] [place]   Precautions:  None No data recorded Referral Information:    Date of Evaluation: Req: 0, Auth: 0, Exp:     05/21/25 POC Auth Visits:  10        Today's Date   6/18/2025    Subjective  Pt states that she has still been very busy and did not have a chance to do any exercises this week.  She is doing well and was able to make it through a dinner at a restaurant and drove home and was able to wait a few minutes until she went to the bathroom.  She still has only been going to the bathroom 1x/night.       Pain:       Objective  See treatment log below         Assessment  Worked on deep breathing with core stabilization activities and pt is able to sense contraction of PFM with exhale.  Pt is doing much better with urinary frequency and urge control and with nocturia symptoms.    Goals (to be met in 10 visits)      Not Met Progress Toward Partially Met Met   Patient will improve PERF score to at least 3/10/5/4 for improved pelvic floor function and pelvic organ support. [] [] [] []   Patient will report decreased urinary frequency to 8x/day and 1x/night indicating normalizing micturition reflex for improved bladder health and function. [] [] [] []   Patient will report ability to postpone urination for at least 30 mins after initial urge presents for improved ability to participate in community outings without fear of UI. [] [] [] []   Patient will report adherence to HEP for continued exercise benefits following cessation of PT. [] [] [] []                       Plan  Continue with diaphragmatic and PFM facilitation activities.    Treatment Last 4 Visits  Treatment Day: 5 5/28/2025 6/4/2025 6/11/2025 6/18/2025   Pelvic Treatment   Neuro Re-Education Instructions for  diaphragmatic breathing with emphasis of core 4 contractions on exhale  Supine diaphragmatic breathing  Supine hip adduction with yoga block with exhale Supine diaphragmatic breathing 6x  Supine hip adduction with yoga block with exhale 10x  Bridges with yoga block with exhale 10x  Bridges with bilat shoulder flexion with exhale 5x  Quadruped LE extension with exhale 5x R/L  Quadruped alternating UE/LE with exhale 5x   Supine hip adduction with yoga block with exhale 10x   Bridges with yoga block with exhale 15x   Supine LE march with exhale 10x  Supine up/up/down/down with exhale 8x  Quadruped LE extension with exhale 8x R/L      Supine hip adduction with yoga block with exhale 10x   Bridges with yoga block with exhale 15x   Supine LE march with exhale 10x   Supine up/up/down/down with exhale 8x   Bridges with leg extension with exhale 8x  Supine 90/90 heel taps with exhale 8x  Quadruped alternating LE extension with exhale 10x      Therapeutic Activity Reviewed bladder diary  Internal pelvic floor assessment and reviewed findings  Pt education regarding inverse relationship of diaphragm and PFM    Discussed symptoms and reviewed bladder diary    Neuro Re-Educ Minutes 15 43 30 38   Therapeutic Activity Minutes 25  10    Total Time Of Timed Procedures 40 43 40 38   Total Time Of Service-Based Procedures 0 0 0 0   Total Treatment Time 40 43 40 38   HEP Diaphragmatic breathing  Supine hip adduction isometric with deep breathing   Access Code: [email or web address]  URL: [email or web address]  Date: 06/18/2025  Prepared by: Meghan Lobato    Exercises  - Supine Bridge with Leg Extension   - Supine Bridge with Mini Swiss Ball Between Knees   - Supine 90/90 Alternating Toe Touch   - Quadruped Alternating Leg Extensions         HEP  Access Code: [email or web address]  URL: [email or web address]  Date: 06/18/2025  Prepared by: Meghan Lobato    Exercises  - Supine Bridge with Leg Extension   - Supine Bridge  with Mini Swiss Ball Between Knees   - Supine 90/90 Alternating Toe Touch   - Quadruped Alternating Leg Extensions     Charges  3 NMR

## 2025-06-25 ENCOUNTER — OFFICE VISIT (OUTPATIENT)
Dept: PHYSICAL THERAPY | Age: 70
End: 2025-06-25
Attending: OBSTETRICS & GYNECOLOGY
Payer: MEDICARE

## 2025-06-25 PROCEDURE — 97112 NEUROMUSCULAR REEDUCATION: CPT

## 2025-06-25 NOTE — PROGRESS NOTES
Patient: Eda Theodore (69 year old, female) Referring Provider:  Insurance:   Diagnosis: Pelvic muscle wasting (N81.84)  Nocturia (R35.1)  Urge incontinence (N39.41) Prabha Tavarez  MEDICARE   Date of Surgery: n/a Next MD visit:  [facility] [place]   Precautions:  None No data recorded Referral Information:    Date of Evaluation: Req: 0, Auth: 0, Exp:     05/21/25 POC Auth Visits:  10        Today's Date   6/25/2025    Subjective  Pt reports that she has just gotten back into her usual exercise routine.  She has been doing well with urinary symptoms.       Pain:       Objective  See treatment log below         Assessment  Reviewed more of pt's core exercises ensuring proper form for trunk stabilization and breathing.  Pt's UUI symptoms have improved, so she will continue to work on her HEP and f/u in a few weeks to reassess PFM strength and to finalize HEP.    Goals (to be met in 10 visits)      Not Met Progress Toward Partially Met Met   Patient will improve PERF score to at least 3/10/5/4 for improved pelvic floor function and pelvic organ support. [] [] [] []   Patient will report decreased urinary frequency to 8x/day and 1x/night indicating normalizing micturition reflex for improved bladder health and function. [] [] [] []   Patient will report ability to postpone urination for at least 30 mins after initial urge presents for improved ability to participate in community outings without fear of UI. [] [] [] []   Patient will report adherence to HEP for continued exercise benefits following cessation of PT. [] [] [] []                           Plan  Continue with diaphragmatic and PFM facilitation activities.  F/u in 1 month to finalize HEP and reassess PFM.    Treatment Last 4 Visits  Treatment Day: 6 6/4/2025 6/11/2025 6/18/2025 6/25/2025   Pelvic Treatment   Neuro Re-Education Supine diaphragmatic breathing 6x  Supine hip adduction with yoga block with exhale 10x  Bridges with yoga block with exhale  10x  Bridges with bilat shoulder flexion with exhale 5x  Quadruped LE extension with exhale 5x R/L  Quadruped alternating UE/LE with exhale 5x   Supine hip adduction with yoga block with exhale 10x   Bridges with yoga block with exhale 15x   Supine LE march with exhale 10x  Supine up/up/down/down with exhale 8x  Quadruped LE extension with exhale 8x R/L      Supine hip adduction with yoga block with exhale 10x   Bridges with yoga block with exhale 15x   Supine LE march with exhale 10x   Supine up/up/down/down with exhale 8x   Bridges with leg extension with exhale 8x  Supine 90/90 heel taps with exhale 8x  Quadruped alternating LE extension with exhale 10x    Supine hip adduction with yoga block with exhale 10x  Bridges with yoga block with exhale 15x  Bridges with leg extension with exhale 8x  Supine 90/90 heel taps with exhale 8x  Quadruped alternating LE extension with exhale 10x   Quadruped hip abd/ER with exhale 5x R/L   Side lying hip abduction with exhale 10x   Therapeutic Activity  Discussed symptoms and reviewed bladder diary     Neuro Re-Educ Minutes 43 30 38 38   Therapeutic Activity Minutes  10     Total Time Of Timed Procedures 43 40 38 38   Total Time Of Service-Based Procedures 0 0 0 0   Total Treatment Time 43 40 38 38   HEP   Access Code: [email or web address]  URL: [email or web address]  Date: 06/18/2025  Prepared by: Meghan Lobato    Exercises  - Supine Bridge with Leg Extension   - Supine Bridge with Mini Swiss Ball Between Knees   - Supine 90/90 Alternating Toe Touch   - Quadruped Alternating Leg Extensions          HEP  Access Code: [email or web address]  URL: [email or web address]  Date: 06/18/2025  Prepared by: Meghan Lobato    Exercises  - Supine Bridge with Leg Extension   - Supine Bridge with Mini Swiss Ball Between Knees   - Supine 90/90 Alternating Toe Touch   - Quadruped Alternating Leg Extensions     Charges  3 NMR

## 2025-07-02 ENCOUNTER — APPOINTMENT (OUTPATIENT)
Dept: PHYSICAL THERAPY | Age: 70
End: 2025-07-02
Attending: OBSTETRICS & GYNECOLOGY
Payer: MEDICARE

## 2025-07-09 ENCOUNTER — APPOINTMENT (OUTPATIENT)
Dept: PHYSICAL THERAPY | Age: 70
End: 2025-07-09
Attending: OBSTETRICS & GYNECOLOGY
Payer: MEDICARE

## 2025-07-14 DIAGNOSIS — F32.A MILD DEPRESSION: ICD-10-CM

## 2025-07-16 ENCOUNTER — APPOINTMENT (OUTPATIENT)
Dept: PHYSICAL THERAPY | Age: 70
End: 2025-07-16
Attending: OBSTETRICS & GYNECOLOGY
Payer: MEDICARE

## 2025-07-20 NOTE — TELEPHONE ENCOUNTER
Refill Passed Per Protocol    Requested Prescriptions   Pending Prescriptions Disp Refills    SERTRALINE 50 MG Oral Tab [Pharmacy Med Name: Sertraline Hydrochloride 50 Mg Tab Nort] 90 tablet 0     Sig: Take 1 tablet (50 mg total) by mouth daily.       Psychiatric Non-Scheduled (Anti-Anxiety) Passed - 7/20/2025 12:35 PM        Passed - In person appointment or virtual visit in the past 6 mos or appointment in next 3 mos     Recent Outpatient Visits              3 weeks ago     [facility] [place] Esp[place], Melissa, PT    Office Visit    1 month ago     [facility] [place] Esp[place], Melissa, PT    Office Visit    1 month ago     [facility] [place] Espinal, Melissa, PT    Office Visit    1 month ago     [facility] [place] Espinal, Melissa, PT    Office Visit    1 month ago     [facility] [place] Espinal, Melissa, PT    Office Visit          Future Appointments         Provider Department Appt Notes    In 3 days [facility], PT [facility] [place] Medicare/Suppl    In 3 days [facility] RM1; B DEXA 1 [facility] DEXA - [place] Checking on bone strength    In 4 months Soheila Edmonds MD [facility] Hematology Oncology [facility] follow up visit.[place]  1y                    Passed - Depression Screening completed within the past 12 months        Passed - Medication is active on med list             Future Appointments         Provider Department Appt Notes    In 3 days [facility],  [facility] [place] Medicare/Suppl    In 3 days [facility] RM1; [facility] DEXA RM1 [facility] DEXA - [place] Checking on bone strength    In 4 months Soheila Edmonds MD [facility] Hematology Oncology [facility] follow up visit.[place]  1y          Recent Outpatient Visits              3 weeks ago     [facility] [place] Espinal, Melissa,      Office Visit    1 month ago     [facility] [place] Esp[place], Melissa, PT    Office Visit    1 month ago     [facility] [place] Esp[place], Melissa, PT    Office Visit    1 month ago     [facility] [place] Esp[place], Melissa, PT    Office Visit    1 month ago     [facility] [place] Esp[place], Melissa, PT    Office Visit

## 2025-07-23 ENCOUNTER — HOSPITAL ENCOUNTER (OUTPATIENT)
Dept: BONE DENSITY | Age: 70
Discharge: HOME | End: 2025-07-23
Attending: NURSE PRACTITIONER
Payer: MEDICARE

## 2025-07-23 ENCOUNTER — OFFICE VISIT (OUTPATIENT)
Dept: PHYSICAL THERAPY | Age: 70
End: 2025-07-23
Attending: OBSTETRICS & GYNECOLOGY
Payer: MEDICARE

## 2025-07-23 DIAGNOSIS — Z78.0 POST-MENOPAUSAL: ICD-10-CM

## 2025-07-23 PROCEDURE — 97530 THERAPEUTIC ACTIVITIES: CPT

## 2025-07-23 PROCEDURE — 77080 DXA BONE DENSITY AXIAL: CPT | Performed by: NURSE PRACTITIONER

## 2025-07-23 NOTE — PROGRESS NOTES
Patient: Eda Theodore (69 year old, female) Referring Provider:  Insurance:   Diagnosis: Pelvic muscle wasting (N81.84)  Nocturia (R35.1)  Urge incontinence (N39.41) Prabha Tavarez  MEDICARE   Date of Surgery: n/a Next MD visit:  [facility] [place]   Precautions:  None No data recorded Referral Information:    Date of Evaluation: Req: 0, Auth: 0, Exp:     05/21/25 POC Auth Visits:  10        Today's Date   7/23/2025     Discharge Summary  Pt has attended 7 visits in Physical Therapy.     Subjective  Pt states that she has been very busy and having a lot of stress related to her uncle.  She has not had any urinary leakage during the day time but still occasionally has had leakage first thing in the morning or during the night when she gets up to go to the bathroom.  The leakage is not as much as it used to be but more of a dribble.       Pain:  n/a     Objective  See treatment log below      PERF: 4/10/3/4     Assessment  Pt has made good progress with PFM strength, urinary frequency and is having minimal urinary leakage due to urge.  She demonstates good understanding of diaphragmatic breathing and is able to incorporate proper breathing with her core activities and HEP.  Pt should do well continuing with her HEP.    Goals (to be met in 10 visits)      Not Met Progress Toward Partially Met Met   Patient will improve PERF score to at least 3/10/5/4 for improved pelvic floor function and pelvic organ support. [] [] [] [x]   Patient will report decreased urinary frequency to 8x/day and 1x/night indicating normalizing micturition reflex for improved bladder health and function. [] [] [] [x]   Patient will report ability to postpone urination for at least 30 mins after initial urge presents for improved ability to participate in community outings without fear of UI. [] [] [] [x]   Patient will report adherence to HEP for continued exercise benefits following cessation of PT. [] [] [] [x]             Plan  Pt will be  discharged from PT and continue with her HEP.    Treatment Last 4 Visits  Treatment Day: 7       6/11/2025 6/18/2025 6/25/2025 7/23/2025   Pelvic Treatment   Neuro Re-Education Supine hip adduction with yoga block with exhale 10x   Bridges with yoga block with exhale 15x   Supine LE march with exhale 10x  Supine up/up/down/down with exhale 8x  Quadruped LE extension with exhale 8x R/L      Supine hip adduction with yoga block with exhale 10x   Bridges with yoga block with exhale 15x   Supine LE march with exhale 10x   Supine up/up/down/down with exhale 8x   Bridges with leg extension with exhale 8x  Supine 90/90 heel taps with exhale 8x  Quadruped alternating LE extension with exhale 10x    Supine hip adduction with yoga block with exhale 10x  Bridges with yoga block with exhale 15x  Bridges with leg extension with exhale 8x  Supine 90/90 heel taps with exhale 8x  Quadruped alternating LE extension with exhale 10x   Quadruped hip abd/ER with exhale 5x R/L   Side lying hip abduction with exhale 10x    Therapeutic Activity Discussed symptoms and reviewed bladder diary   Reviewed urge deferment techniques and urinary frequency  Pelvic floor muscle assessment completed and reviewed findings   Neuro Re-Educ Minutes 30 38 38    Therapeutic Activity Minutes 10   30   Total Time Of Timed Procedures 40 38 38 30   Total Time Of Service-Based Procedures 0 0 0 0   Total Treatment Time 40 38 38 30   HEP  Access Code: [email or web address]  URL: [email or web address]  Date: 06/18/2025  Prepared by: Meghan Lobato    Exercises  - Supine Bridge with Leg Extension   - Supine Bridge with Mini Swiss Ball Between Knees   - Supine 90/90 Alternating Toe Touch   - Quadruped Alternating Leg Extensions           HEP  Access Code: [email or web address]  URL: [email or web address]  Date: 06/18/2025  Prepared by: Meghan Lobato    Exercises  - Supine Bridge with Leg Extension   - Supine Bridge with Mini Swiss Ball Between Knees    - Supine 90/90 Alternating Toe Touch   - Quadruped Alternating Leg Extensions     Charges  2 Ther act

## (undated) DEVICE — KIT ENDO ORCAPOD 160/180/190

## (undated) DEVICE — KIT CLEAN ENDOKIT 1.1OZ GOWNX2

## (undated) DEVICE — MEDI-VAC NON-CONDUCTIVE SUCTION TUBING 6MM X 1.8M (6FT.) L: Brand: CARDINAL HEALTH

## (undated) DEVICE — GIJAW SINGLE-USE BIOPSY FORCEPS WITH NEEDLE: Brand: GIJAW

## (undated) DEVICE — Device: Brand: DUAL NARE NASAL CANNULAE FEMALE LUER CON 7FT O2 TUBE

## (undated) DEVICE — 60 ML SYRINGE REGULAR TIP: Brand: MONOJECT

## (undated) NOTE — LETTER
Jennifer Lima Md  [facility]       08/16/18        Patient: Fernando Conteh   YOB: 1955   Date of Visit: 8/16/2018       Dear  Dr. James Mota MD,      Thank you for referring Fernando Conteh to my practice.

## (undated) NOTE — LETTER
Patient Name: Osmin Alvarenga  YOB: 1955          MRN number:  [id number]  Date:  11/30/2018  Referring Physician: Jerry Edmondson     LOWER EXTREMITY EVALUATION:    Referring Physician: Dr. Madhavi Kim  Date of Onset: Nov 12, 2018 Date of Service Patient responding to unweighted extension during session and posture correction. Work to centralize/abolish pain, increase strength LE and return to previous activity levels.      Precautions:  None    PLAN OF CARE:   Goals:    Goals     • Therapy Goals

## (undated) NOTE — LETTER
[facility]  [address], [place], [place]  Authorization for Surgical Operation and Procedure                                                                                           I hereby authorize David Mcmillan MD, my physician and his/her assistants (if applicable), which may include medical students, residents, and/or fellows, to perform the following surgical operation/ procedure and administer such anesthesia as may be determined necessary by my physician: Operation/Procedure name (s) COLONOSCOPY on Eda CHILDRESS Loder   2.   I recognize that during the surgical operation/procedure, unforeseen conditions may necessitate additional or different procedures than those listed above.  I, therefore, further authorize and request that the above-named surgeon, assistants, or designees perform such procedures as are, in their judgment, necessary and desirable.    3.   My surgeon/physician has discussed prior to my surgery the potential benefits, risks and side effects of this procedure; the likelihood of achieving goals; and potential problems that might occur during recuperation.  They also discussed reasonable alternatives to the procedure, including risks, benefits, and side effects related to the alternatives and risks related to not receiving this procedure.  I have had all my questions answered and I acknowledge that no guarantee has been made as to the result that may be obtained.    4.   Should the need arise during my operation/procedure, which includes change of level of care prior to discharge, I also consent to the administration of blood and/or blood products.  Further, I understand that despite careful testing and screening of blood or blood products by collecting agencies, I may still be subject to ill effects as a result of receiving a blood transfusion and/or blood products.  The following are some, but not all, of the potential risks that can occur: fever and allergic  reactions, hemolytic reactions, transmission of diseases such as Hepatitis, AIDS and Cytomegalovirus (CMV) and fluid overload.  In the event that I wish to have an autologous transfusion of my own blood, or a directed donor transfusion, I will discuss this with my physician.  Check only if Refusing Blood or Blood Products  I understand refusal of blood or blood products as deemed necessary by my physician may have serious consequences to my condition to include possible death. I hereby assume responsibility for my refusal and release the hospital, its personnel, and my physicians from any responsibility for the consequences of my refusal.    o  Refuse   5.   I authorize the use of any specimen, organs, tissues, body parts or foreign objects that may be removed from my body during the operation/procedure for diagnosis, research or teaching purposes and their subsequent disposal by hospital authorities.  I also authorize the release of specimen test results and/or written reports to my treating physician on the hospital medical staff or other referring or consulting physicians involved in my care, at the discretion of the Pathologist or my treating physician.    6.   I consent to the photographing or videotaping of the operations or procedures to be performed, including appropriate portions of my body for medical, scientific, or educational purposes, provided my identity is not revealed by the pictures or by descriptive texts accompanying them.  If the procedure has been photographed/videotaped, the surgeon will obtain the original picture, image, videotape or CD.  The hospital will not be responsible for storage, release or maintenance of the picture, image, tape or CD.    7.   I consent to the presence of a  or observers in the operating room as deemed necessary by my physician or their designees.    8.   I recognize that in the event my procedure results in extended X-Ray/fluoroscopy time, I may  develop a skin reaction.    9. If I have a Do Not Attempt Resuscitation (DNAR) order in place, that status will be suspended while in the operating room, procedural suite, and during the recovery period unless otherwise explicitly stated by me (or a person authorized to consent on my behalf). The surgeon or my attending physician will determine when the applicable recovery period ends for purposes of reinstating the DNAR order.  10. Patients having a sterilization procedure: I understand that if the procedure is successful the results will be permanent and it will therefore be impossible for me to inseminate, conceive, or bear children.  I also understand that the procedure is intended to result in sterility, although the result has not been guaranteed.   11. I acknowledge that my physician has explained sedation/analgesia administration to me including the risk and benefits I consent to the administration of sedation/analgesia as may be necessary or desirable in the judgment of my physician.    I CERTIFY THAT I HAVE READ AND FULLY UNDERSTAND THE ABOVE CONSENT TO OPERATION and/or OTHER PROCEDURE.     _________________________________________ _________________________________     ___________________________________  Signature of Patient     Signature of Responsible Person                   Printed Name of Responsible Person                              _________________________________________ ______________________________        ___________________________________  Signature of Witness         Date  Time         Relationship to Patient    STATEMENT OF PHYSICIAN My signature below affirms that prior to the time of the procedure; I have explained to the patient and/or his/her legal representative, the risks and benefits involved in the proposed treatment and any reasonable alternative to the proposed treatment. I have also explained the risks and benefits involved in refusal of the proposed treatment and alternatives  to the proposed treatment and have answered the patient's questions. If I have a significant financial interest in a co-management agreement or a significant financial interest in any product or implant, or other significant relationship used in this procedure/surgery, I have disclosed this and had a discussion with my patient.     _______________________________________________________________ _____________________________  (Signature of Physician)                                                                                         (Date)                                   (Time)  Patient Name: Eda Theodore    : 10/6/1955   Printed: 2024      Medical Record #: [id number]                                              Page 1 of